# Patient Record
Sex: FEMALE | Race: BLACK OR AFRICAN AMERICAN | NOT HISPANIC OR LATINO | Employment: OTHER | URBAN - METROPOLITAN AREA
[De-identification: names, ages, dates, MRNs, and addresses within clinical notes are randomized per-mention and may not be internally consistent; named-entity substitution may affect disease eponyms.]

---

## 2020-05-28 ENCOUNTER — LAB VISIT (OUTPATIENT)
Dept: PRIMARY CARE CLINIC | Facility: CLINIC | Age: 75
End: 2020-05-28
Payer: MEDICARE

## 2020-05-28 DIAGNOSIS — R05.9 COUGH: Primary | ICD-10-CM

## 2020-05-28 PROCEDURE — U0003 INFECTIOUS AGENT DETECTION BY NUCLEIC ACID (DNA OR RNA); SEVERE ACUTE RESPIRATORY SYNDROME CORONAVIRUS 2 (SARS-COV-2) (CORONAVIRUS DISEASE [COVID-19]), AMPLIFIED PROBE TECHNIQUE, MAKING USE OF HIGH THROUGHPUT TECHNOLOGIES AS DESCRIBED BY CMS-2020-01-R: HCPCS

## 2020-05-29 LAB — SARS-COV-2 RNA RESP QL NAA+PROBE: NOT DETECTED

## 2021-10-19 ENCOUNTER — OFFICE VISIT (OUTPATIENT)
Dept: URGENT CARE | Facility: CLINIC | Age: 76
End: 2021-10-19
Payer: MEDICARE

## 2021-10-19 VITALS
HEART RATE: 85 BPM | SYSTOLIC BLOOD PRESSURE: 141 MMHG | HEIGHT: 62 IN | WEIGHT: 154 LBS | DIASTOLIC BLOOD PRESSURE: 88 MMHG | BODY MASS INDEX: 28.34 KG/M2 | RESPIRATION RATE: 17 BRPM | OXYGEN SATURATION: 98 % | TEMPERATURE: 97 F

## 2021-10-19 DIAGNOSIS — R19.7 DIARRHEA, UNSPECIFIED TYPE: Primary | ICD-10-CM

## 2021-10-19 DIAGNOSIS — A08.4 VIRAL GASTROENTERITIS: ICD-10-CM

## 2021-10-19 LAB
CTP QC/QA: YES
GLUCOSE SERPL-MCNC: 121 MG/DL (ref 70–110)
POC ANION GAP: 20 MMOL/L (ref 10–20)
POC BUN: 13 MMOL/L (ref 8–26)
POC CHLORIDE: 99 MMOL/L (ref 98–109)
POC CREATININE: 0.9 MG/DL (ref 0.6–1.3)
POC HEMATOCRIT: 41 %PCV (ref 37–47)
POC HEMOGLOBIN: 13.9 G/DL (ref 12.5–16)
POC ICA: 1.11 MMOL/L (ref 1.12–1.32)
POC POTASSIUM: 3.9 MMOL/L (ref 3.5–4.9)
POC SODIUM: 136 MMOL/L (ref 138–146)
POC TCO2: 22 MMOL/L (ref 24–29)
SARS-COV-2 RDRP RESP QL NAA+PROBE: NEGATIVE

## 2021-10-19 PROCEDURE — U0002: ICD-10-PCS | Mod: QW,CR,S$GLB,

## 2021-10-19 PROCEDURE — 99203 OFFICE O/P NEW LOW 30 MIN: CPT | Mod: S$GLB,CS,,

## 2021-10-19 PROCEDURE — 80047 BASIC METABLC PNL IONIZED CA: CPT | Mod: QW,S$GLB,,

## 2021-10-19 PROCEDURE — 99203 PR OFFICE/OUTPT VISIT, NEW, LEVL III, 30-44 MIN: ICD-10-PCS | Mod: S$GLB,CS,,

## 2021-10-19 PROCEDURE — U0002 COVID-19 LAB TEST NON-CDC: HCPCS | Mod: QW,CR,S$GLB,

## 2021-10-19 PROCEDURE — 80047 POCT CHEMISTRY PANEL: ICD-10-PCS | Mod: QW,S$GLB,,

## 2023-09-08 ENCOUNTER — OFFICE VISIT (OUTPATIENT)
Dept: URGENT CARE | Facility: CLINIC | Age: 78
End: 2023-09-08
Payer: MEDICARE

## 2023-09-08 VITALS
RESPIRATION RATE: 20 BRPM | TEMPERATURE: 99 F | HEART RATE: 83 BPM | OXYGEN SATURATION: 98 % | BODY MASS INDEX: 28.34 KG/M2 | SYSTOLIC BLOOD PRESSURE: 143 MMHG | HEIGHT: 62 IN | WEIGHT: 154 LBS | DIASTOLIC BLOOD PRESSURE: 86 MMHG

## 2023-09-08 DIAGNOSIS — R52 BODY ACHES: Primary | ICD-10-CM

## 2023-09-08 DIAGNOSIS — J06.9 URI WITH COUGH AND CONGESTION: ICD-10-CM

## 2023-09-08 LAB
CTP QC/QA: YES
SARS-COV-2 AG RESP QL IA.RAPID: NEGATIVE

## 2023-09-08 PROCEDURE — 87811 SARS-COV-2 COVID19 W/OPTIC: CPT | Mod: QW,S$GLB,, | Performed by: FAMILY MEDICINE

## 2023-09-08 PROCEDURE — 87811 SARS CORONAVIRUS 2 ANTIGEN POCT, MANUAL READ: ICD-10-PCS | Mod: QW,S$GLB,, | Performed by: FAMILY MEDICINE

## 2023-09-08 PROCEDURE — 99213 OFFICE O/P EST LOW 20 MIN: CPT | Mod: S$GLB,,, | Performed by: FAMILY MEDICINE

## 2023-09-08 PROCEDURE — 99213 PR OFFICE/OUTPT VISIT, EST, LEVL III, 20-29 MIN: ICD-10-PCS | Mod: S$GLB,,, | Performed by: FAMILY MEDICINE

## 2023-09-08 RX ORDER — BENZONATATE 200 MG/1
200 CAPSULE ORAL 3 TIMES DAILY PRN
Qty: 30 CAPSULE | Refills: 0 | Status: SHIPPED | OUTPATIENT
Start: 2023-09-08 | End: 2023-09-18

## 2023-09-08 RX ORDER — PROMETHAZINE HYDROCHLORIDE AND DEXTROMETHORPHAN HYDROBROMIDE 6.25; 15 MG/5ML; MG/5ML
5 SYRUP ORAL EVERY 4 HOURS PRN
Qty: 240 ML | Refills: 0 | Status: SHIPPED | OUTPATIENT
Start: 2023-09-08 | End: 2023-09-18

## 2023-09-08 NOTE — PROGRESS NOTES
"Subjective:      Patient ID: Caridad Hinds is a 77 y.o. female.    Vitals:  height is 5' 2.01" (1.575 m) and weight is 69.9 kg (154 lb). Her temperature is 99.4 °F (37.4 °C). Her blood pressure is 143/86 (abnormal) and her pulse is 83. Her respiration is 20 and oxygen saturation is 98%.     Chief Complaint: Sore Throat    Sore Throat   This is a new problem. The current episode started in the past 7 days (started on tuesday). The problem has been gradually worsening. Neither side of throat is experiencing more pain than the other. There has been no fever. Associated symptoms include congestion, coughing, ear pain (both), headaches and trouble swallowing. Pertinent negatives include no diarrhea, drooling, ear discharge, hoarse voice, neck pain, shortness of breath, stridor or swollen glands. Associated symptoms comments: BODY ACHES and  chest congestion     . She has had no exposure to strep or mono. She has tried nothing for the symptoms.       HENT:  Positive for ear pain (both), congestion, sore throat and trouble swallowing. Negative for ear discharge and drooling.    Neck: Negative for neck pain.   Respiratory:  Positive for cough. Negative for shortness of breath and stridor.    Gastrointestinal:  Negative for diarrhea.   Neurological:  Positive for headaches.      Objective:     Vitals:    09/08/23 0946   BP: (!) 143/86   BP Location: Right arm   Patient Position: Sitting   BP Method: Medium (Automatic)   Pulse: 83   Resp: 20   Temp: 99.4 °F (37.4 °C)   SpO2: 98%   Weight: 69.9 kg (154 lb)   Height: 5' 2.01" (1.575 m)      Physical Exam   Constitutional: She is oriented to person, place, and time.  Non-toxic appearance. She does not appear ill. No distress.   HENT:   Head: Atraumatic.   Eyes: Conjunctivae are normal.   Cardiovascular: Normal rate, regular rhythm, normal heart sounds and normal pulses.   Pulmonary/Chest: Effort normal and breath sounds normal.   Neurological: She is alert and oriented to " person, place, and time.   Skin: Skin is not diaphoretic.   Psychiatric: Judgment and thought content normal.     Results for orders placed or performed in visit on 09/08/23   SARS Coronavirus 2 Antigen, POCT Manual Read   Result Value Ref Range    SARS Coronavirus 2 Antigen Negative Negative     Acceptable Yes       Assessment:     1. Body aches    2. URI with cough and congestion        Plan:       Body aches  -     SARS Coronavirus 2 Antigen, POCT Manual Read    2. URI with cough and congestion  -     promethazine-dextromethorphan (PROMETHAZINE-DM) 6.25-15 mg/5 mL Syrp; Take 5 mLs by mouth every 4 (four) hours as needed (cough).  Dispense: 240 mL; Refill: 0  -     benzonatate (TESSALON) 200 MG capsule; Take 1 capsule (200 mg total) by mouth 3 (three) times daily as needed for Cough.  Dispense: 30 capsule; Refill: 0      Patient Instructions   Below are suggestions for symptomatic relief of your upper respiratory symptoms:              -Salt water gargles to soothe throat pain.              -Chloroseptic spray and Cepacol lozenges also help to numb throat pain.              -Warm herbal teas with honey/lemon/ginger can help soothe sore throat and hoarseness              -Nasal saline spray reduces inflammation and dryness.              -Warm face compresses to help with facial sinus pain/pressure.              -Humidifiers and steam can help with nasal dryness and congestion              -Vicks vapor rub at night for chest congestion.              -Flonase OTC or Nasacort OTC for nasal congestion and post-nasal drip. Ok to use twice daily for the first week, then reduce to once daily after symptoms have begun to improve.              -Afrin is a nasal spray that can give immediate relief of nasal congestion but you cannot use this medication for more than 3 days              -Simple foods like chicken noodle soup.              - Mucinex for congestion or Mucinex DM for cough during the day time.  Delsym helps with coughing at night. Mucinex-D if you have sinus pressure/sinus pain or chest congestion. (caution if history of high blood pressure or palpitations). You must increase your water intake when using expectorants (Mucinex).             -Zyrtec/Claritin/Allegra/Xyzal should help with allergies.  -If you DO NOT have Hypertension or any history of palpitations, it is ok to take over the counter Sudafed or Mucinex D or Allegra-D or Claritin-D or Zyrtec-D.  -If you do take one of the above, it is ok to combine that with plain over the counter Mucinex or Allegra or Claritin or Zyrtec. If, for example, you are taking Zyrtec -D, you can combine that with Mucinex, but not Mucinex-D.  If you are taking Mucinex-D, you can combine that with plain Allegra or Claritin or Zyrtec.   -If you DO have Hypertension or palpitations, it is safe to take Coricidin HBP for relief of sinus symptoms.     Seek immediate care in the emergency room in the event of severe abdominal pain, chest pain, respiratory distress, fever unresponsive to antipyretic, dehydration, loss of consciousness, seizure.

## 2023-09-08 NOTE — PATIENT INSTRUCTIONS
Below are suggestions for symptomatic relief of your upper respiratory symptoms:              -Salt water gargles to soothe throat pain.              -Chloroseptic spray and Cepacol lozenges also help to numb throat pain.              -Warm herbal teas with honey/lemon/carlos can help soothe sore throat and hoarseness              -Nasal saline spray reduces inflammation and dryness.              -Warm face compresses to help with facial sinus pain/pressure.              -Humidifiers and steam can help with nasal dryness and congestion              -Vicks vapor rub at night for chest congestion.              -Flonase OTC or Nasacort OTC for nasal congestion and post-nasal drip. Ok to use twice daily for the first week, then reduce to once daily after symptoms have begun to improve.              -Afrin is a nasal spray that can give immediate relief of nasal congestion but you cannot use this medication for more than 3 days              -Simple foods like chicken noodle soup.              - Mucinex for congestion or Mucinex DM for cough during the day time. Delsym helps with coughing at night. Mucinex-D if you have sinus pressure/sinus pain or chest congestion. (caution if history of high blood pressure or palpitations). You must increase your water intake when using expectorants (Mucinex).             -Zyrtec/Claritin/Allegra/Xyzal should help with allergies.  -If you DO NOT have Hypertension or any history of palpitations, it is ok to take over the counter Sudafed or Mucinex D or Allegra-D or Claritin-D or Zyrtec-D.  -If you do take one of the above, it is ok to combine that with plain over the counter Mucinex or Allegra or Claritin or Zyrtec. If, for example, you are taking Zyrtec -D, you can combine that with Mucinex, but not Mucinex-D.  If you are taking Mucinex-D, you can combine that with plain Allegra or Claritin or Zyrtec.   -If you DO have Hypertension or palpitations, it is safe to take Coricidin HBP for  relief of sinus symptoms.     Seek immediate care in the emergency room in the event of severe abdominal pain, chest pain, respiratory distress, fever unresponsive to antipyretic, dehydration, loss of consciousness, seizure.

## 2023-11-01 ENCOUNTER — OFFICE VISIT (OUTPATIENT)
Dept: ORTHOPEDICS | Facility: CLINIC | Age: 78
End: 2023-11-01
Payer: MEDICARE

## 2023-11-01 VITALS — WEIGHT: 154 LBS | HEIGHT: 62 IN | BODY MASS INDEX: 28.34 KG/M2

## 2023-11-01 DIAGNOSIS — M47.817 LUMBOSACRAL SPONDYLOSIS WITHOUT MYELOPATHY: ICD-10-CM

## 2023-11-01 DIAGNOSIS — M48.062 LUMBAR STENOSIS WITH NEUROGENIC CLAUDICATION: Primary | ICD-10-CM

## 2023-11-01 DIAGNOSIS — M43.16 SPONDYLOLISTHESIS, LUMBAR REGION: ICD-10-CM

## 2023-11-01 PROCEDURE — 99203 OFFICE O/P NEW LOW 30 MIN: CPT | Mod: S$GLB,,, | Performed by: ORTHOPAEDIC SURGERY

## 2023-11-01 PROCEDURE — 99203 PR OFFICE/OUTPT VISIT, NEW, LEVL III, 30-44 MIN: ICD-10-PCS | Mod: S$GLB,,, | Performed by: ORTHOPAEDIC SURGERY

## 2023-11-01 RX ORDER — DICYCLOMINE HYDROCHLORIDE 20 MG/1
20 TABLET ORAL 3 TIMES DAILY
COMMUNITY
Start: 2023-10-30

## 2023-11-01 RX ORDER — CLOPIDOGREL BISULFATE 75 MG/1
75 TABLET ORAL
COMMUNITY
Start: 2023-10-16

## 2023-11-01 RX ORDER — FAMOTIDINE 40 MG/1
40 TABLET, FILM COATED ORAL NIGHTLY
COMMUNITY
Start: 2023-08-07

## 2023-11-01 RX ORDER — CALCIUM CARB/VITAMIN D3/VIT K1 500-500-40
TABLET,CHEWABLE ORAL
COMMUNITY

## 2023-11-01 RX ORDER — ROSUVASTATIN CALCIUM 10 MG/1
10 TABLET, COATED ORAL
COMMUNITY
Start: 2023-09-11

## 2023-11-01 RX ORDER — METOPROLOL SUCCINATE 100 MG/1
100 TABLET, EXTENDED RELEASE ORAL
COMMUNITY
Start: 2023-09-11

## 2023-11-01 RX ORDER — CYCLOSPORINE 0.5 MG/ML
1 EMULSION OPHTHALMIC 2 TIMES DAILY
COMMUNITY
Start: 2023-07-07

## 2023-11-01 NOTE — PROGRESS NOTES
Subjective:       Patient ID: Caridad Hinds is a 77 y.o. female.    Chief Complaint: Pain of the Lumbar Spine (Chronic BL lumbar pain that started to worsen over the last 2 months ago. Pair radiates down left leg to toes. Right leg is described as dull and achy with numb toes. Had MRI in 2015.Does complain pain and stiffness is more prominent after significant activity. Has been going to PT with some improvement)      History of Present Illness    Prior to meeting with the patient I reviewed the medical chart in [facility]. This included reviewing the previous progress notes from our office, review of the patient's last appointment with their primary care provider, review of any visits to the emergency room, and review of any pain management appointments or procedures.   77-year-old lady with chronic complaints of low back pain radiating down the left leg to left foot and right leg to the right knee history of of stenosis 7 years ago responded to an epidural steroid injection.  Presently complains of bilateral leg pain back and buttock symptoms no bowel she has undergone physical therapy and chiropractic treatments recently and they have not helped her claudication symptoms.    Current Medications  Current Outpatient Medications   Medication Sig Dispense Refill    cholecalciferol, vitamin D3, 10 mcg (400 unit) Cap capsule Take by mouth.      clopidogreL (PLAVIX) 75 mg tablet Take 75 mg by mouth.      dicyclomine (BENTYL) 20 mg tablet Take 20 mg by mouth 3 (three) times daily.      famotidine (PEPCID) 40 MG tablet Take 40 mg by mouth every evening.      metoprolol succinate (TOPROL-XL) 100 MG 24 hr tablet Take 100 mg by mouth.      metoprolol tartrate (LOPRESSOR) 50 MG tablet Take 50 mg by mouth 2 (two) times daily.      olmesartan-hydrochlorothiazide (BENICAR HCT) 40-25 mg per tablet Take 1 tablet by mouth once daily.      potassium chloride SA (K-DUR,KLOR-CON) 10 MEQ tablet Take 10 mEq by mouth once daily.      RESTASIS  0.05 % ophthalmic emulsion Place 1 drop into both eyes 2 (two) times daily.      rosuvastatin (CRESTOR) 10 MG tablet Take 10 mg by mouth.      thyroid, pork, (ARMOUR THYROID) 60 mg Tab Take 60 mg by mouth once daily.      meloxicam (MOBIC) 7.5 MG tablet Take 1 tablet (7.5 mg total) by mouth once daily. (Patient not taking: Reported on 11/1/2023) 15 tablet 0     No current facility-administered medications for this visit.       Allergies  Review of patient's allergies indicates:   Allergen Reactions    Darvocet a500 [propoxyphene n-acetaminophen] Hives     Combination drug containing Darvocet, flexeril, & ibuprofen caused hives; does not know which ingredient caused the reaction    Flexeril [cyclobenzaprine] Hives     Combination drug containing Darvocet, flexeril, & ibuprofen caused hives; does not know which ingredient caused the reaction    Motrin [ibuprofen] Hives     Combination drug containing Darvocet, flexeril, & ibuprofen caused hives; does not know which ingredient caused the reaction    Hydrocodone-acetaminophen Other (See Comments)     Other reaction(s): NVD      Sulfamethoxazole-trimethoprim Other (See Comments)    Tramadol Other (See Comments)     Other reaction(s): NVD         Past Medical History  Past Medical History:   Diagnosis Date    Hyperlipidemia     Hypertension     Thyroid disease        Surgical History  Past Surgical History:   Procedure Laterality Date    falopian Left 1986    ectopic pregnancy    THYROIDECTOMY, PARTIAL N/A 1972       Family History:   History reviewed. No pertinent family history.    Social History:   Social History     Socioeconomic History    Marital status:    Tobacco Use    Smoking status: Never    Smokeless tobacco: Never   Substance and Sexual Activity    Alcohol use: No       Hospitalization/Major Diagnostic Procedure:     Review of Systems     General/Constitutional:  Chills denies. Fatigue denies. Fever denies. Weight gain denies. Weight loss  denies.    Respiratory:  Shortness of breath denies.    Cardiovascular:  Chest pain denies.    Gastrointestinal:  Constipation denies. Diarrhea denies. Nausea denies. Vomiting denies.     Hematology:  Easy bruising denies. Prolonged bleeding denies.     Genitourinary:  Frequent urination denies. Pain in lower back denies. Painful urination denies.     Musculoskeletal:  See HPI for details    Skin:  Rash denies.    Neurologic:  Dizziness denies. Gait abnormalities denies. Seizures denies. Tingling/Numbess denies.    Psychiatric:  Anxiety denies. Depressed mood denies.     Objective:   Vital Signs: There were no vitals filed for this visit.     Physical Exam      General Examination:     Constitutional: The patient is alert and oriented to lace person and time. Mood is pleasant.     Head/Face: Normal facial features normal eyebrows    Eyes: Normal extraocular motion bilaterally    Lungs: Respirations are equal and unlabored    Gait is coordinated.    Cardiovascular: There are no swelling or varicosities present.    Lymphatic: Negative for adenopathy    Skin: Normal    Neurological: Level of consciousness normal. Oriented to place person and time and situation    Psychiatric: Oriented to time place person and situation    Patient stand erect has pain when doing so tenderness both posterior iliac spine areas range of motion mildly restricted straight-leg-raising positive on the left motor exam normal both lower extremities hip and knee range of motion intact pedal pulses intact no edema adenopathy varicosities.    XRAY Report/ Interpretation :  AP pelvis x-ray was taken today. Indications low back pain and/or hip pain. Findings AP pelvis x-ray appears to be normal with no evidence of fractures or significant degenerative disease  AP and lateral x-rays of lumbar spine will performed today. Indications low back pain. Findings:  Grade 1 degenerative spondylolisthesis L4-5 marked disc space narrowing lower lumbar spine most  marked multilevel  Lumbar MRI report from 2015 was reviewed significant spinal stenosis at L4-5 and degenerative spondylolisthesis at L4-5 actual films were not available    Assessment:       1. Lumbar stenosis with neurogenic claudication    2. Lumbosacral spondylosis without myelopathy    3. Spondylolisthesis, lumbar region        Plan:       Caridad was seen today for pain.    Diagnoses and all orders for this visit:    Lumbar stenosis with neurogenic claudication    Lumbosacral spondylosis without myelopathy  -     X-Ray Lumbar Spine Ap And Lateral  -     X-Ray Pelvis Routine AP    Spondylolisthesis, lumbar region         No follow-ups on file.    Probable progression of spinal stenosis at L4-5 possible adjacent segment stenosis new MRI has been advised I have explained the nature of the condition and the probability that the only treatment options will be a repeat epidural steroid injection versus lumbar decompression.  Return after MRI for further discussion  Treatment options were discussed with regards to the nature of the medical condition. Conservative pain intervention and surgical options were discussed in detail. The probability of success of each separate treatment option was discussed. The patient expressed a clear understanding of the treatment options. With regards to surgery, the procedure risk, benefits, complications, and outcomes were discussed. No guarantees were given with regards to surgical outcome.   The risk of complications, morbidity, and mortality of patient management decisions have been made at the time of this visit. These are associated with the patient's problems, diagnostic procedures and treatment options. This includes the possible management options selected and those considered but not selected by the patient after shared medical decision making we discussed with the patient.   This note was created using Dragon voice recognition software that occasionally misinterpreted phrases  or words.

## 2023-11-03 ENCOUNTER — TELEPHONE (OUTPATIENT)
Dept: ORTHOPEDICS | Facility: CLINIC | Age: 78
End: 2023-11-03

## 2023-11-03 NOTE — TELEPHONE ENCOUNTER
Submitted PA through pt secondary [facility] Medicare. Received not in scope message. Called number on back of that card. They do not do PA's, said to call medicare. They do not require PA's. Marked MRI authorized.

## 2023-11-20 ENCOUNTER — OFFICE VISIT (OUTPATIENT)
Dept: ORTHOPEDICS | Facility: CLINIC | Age: 78
End: 2023-11-20
Payer: MEDICARE

## 2023-11-20 VITALS — BODY MASS INDEX: 28.34 KG/M2 | WEIGHT: 154 LBS | HEIGHT: 62 IN

## 2023-11-20 DIAGNOSIS — M51.26 LUMBAR DISC HERNIATION: ICD-10-CM

## 2023-11-20 DIAGNOSIS — M43.16 SPONDYLOLISTHESIS, LUMBAR REGION: ICD-10-CM

## 2023-11-20 DIAGNOSIS — M48.062 LUMBAR STENOSIS WITH NEUROGENIC CLAUDICATION: Primary | ICD-10-CM

## 2023-11-20 PROCEDURE — 99213 OFFICE O/P EST LOW 20 MIN: CPT | Mod: S$GLB,,, | Performed by: ORTHOPAEDIC SURGERY

## 2023-11-20 PROCEDURE — 99213 PR OFFICE/OUTPT VISIT, EST, LEVL III, 20-29 MIN: ICD-10-PCS | Mod: S$GLB,,, | Performed by: ORTHOPAEDIC SURGERY

## 2023-11-20 NOTE — PROGRESS NOTES
Subjective:       Patient ID: Caridad Hinds is a 77 y.o. female.    Chief Complaint: Pain of the Lumbar Spine (Lumbar pain follow up. Here for MRI results review. States that her pain continues to radiate down BL legs to toes. Left is worse and more frequent to right)      History of Present Illness    Prior to meeting with the patient I reviewed the medical chart in [facility]. This included reviewing the previous progress notes from our office, review of the patient's last appointment with their primary care provider, review of any visits to the emergency room, and review of any pain management appointments or procedures.   Patient is here follow-up for bilateral leg pain back and buttock symptoms.  Denies any bowel or bladder dysfunction.  She has undergone physical therapy and chiropractic treatments recently and they have not helped her claudication symptoms.  She is here to review an updated lumbar MRI.    She has a past medical history of chronic low back pain radiating down the left leg to left foot and right leg to the right knee with a history of of stenosis 7 years ago responded to an epidural steroid injection.      Current Medications  Current Outpatient Medications   Medication Sig Dispense Refill    cholecalciferol, vitamin D3, 10 mcg (400 unit) Cap capsule Take by mouth.      clopidogreL (PLAVIX) 75 mg tablet Take 75 mg by mouth.      dicyclomine (BENTYL) 20 mg tablet Take 20 mg by mouth 3 (three) times daily.      famotidine (PEPCID) 40 MG tablet Take 40 mg by mouth every evening.      metoprolol succinate (TOPROL-XL) 100 MG 24 hr tablet Take 100 mg by mouth.      olmesartan-hydrochlorothiazide (BENICAR HCT) 40-25 mg per tablet Take 1 tablet by mouth once daily.      potassium chloride SA (K-DUR,KLOR-CON) 10 MEQ tablet Take 10 mEq by mouth once daily.      RESTASIS 0.05 % ophthalmic emulsion Place 1 drop into both eyes 2 (two) times daily.      rosuvastatin (CRESTOR) 10 MG tablet Take 10 mg by mouth.       thyroid, pork, (ARMOUR THYROID) 60 mg Tab Take 60 mg by mouth once daily.      meloxicam (MOBIC) 7.5 MG tablet Take 1 tablet (7.5 mg total) by mouth once daily. (Patient not taking: Reported on 11/20/2023) 15 tablet 0    metoprolol tartrate (LOPRESSOR) 50 MG tablet Take 50 mg by mouth 2 (two) times daily.       No current facility-administered medications for this visit.       Allergies  Review of patient's allergies indicates:   Allergen Reactions    Darvocet a500 [propoxyphene n-acetaminophen] Hives     Combination drug containing Darvocet, flexeril, & ibuprofen caused hives; does not know which ingredient caused the reaction    Flexeril [cyclobenzaprine] Hives     Combination drug containing Darvocet, flexeril, & ibuprofen caused hives; does not know which ingredient caused the reaction    Motrin [ibuprofen] Hives     Combination drug containing Darvocet, flexeril, & ibuprofen caused hives; does not know which ingredient caused the reaction    Hydrocodone-acetaminophen Other (See Comments)     Other reaction(s): NVD      Sulfamethoxazole-trimethoprim Other (See Comments)    Tramadol Other (See Comments)     Other reaction(s): NVD         Past Medical History  Past Medical History:   Diagnosis Date    Hyperlipidemia     Hypertension     Thyroid disease        Surgical History  Past Surgical History:   Procedure Laterality Date    falopian Left 1986    ectopic pregnancy    THYROIDECTOMY, PARTIAL N/A 1972       Family History:   History reviewed. No pertinent family history.    Social History:   Social History     Socioeconomic History    Marital status:    Tobacco Use    Smoking status: Never    Smokeless tobacco: Never   Substance and Sexual Activity    Alcohol use: No       Hospitalization/Major Diagnostic Procedure:     Review of Systems     General/Constitutional:  Chills denies. Fatigue denies. Fever denies. Weight gain denies. Weight loss denies.    Respiratory:  Shortness of breath  denies.    Cardiovascular:  Chest pain denies.    Gastrointestinal:  Constipation denies. Diarrhea denies. Nausea denies. Vomiting denies.     Hematology:  Easy bruising denies. Prolonged bleeding denies.     Genitourinary:  Frequent urination denies. Pain in lower back denies. Painful urination denies.     Musculoskeletal:  See HPI for details    Skin:  Rash denies.    Neurologic:  Dizziness denies. Gait abnormalities denies. Seizures denies. Tingling/Numbess denies.    Psychiatric:  Anxiety denies. Depressed mood denies.     Objective:   Vital Signs: There were no vitals filed for this visit.     Physical Exam      General Examination:     Constitutional: The patient is alert and oriented to lace person and time. Mood is pleasant.     Head/Face: Normal facial features normal eyebrows    Eyes: Normal extraocular motion bilaterally    Lungs: Respirations are equal and unlabored    Gait is coordinated.    Cardiovascular: There are no swelling or varicosities present.    Lymphatic: Negative for adenopathy    Skin: Normal    Neurological: Level of consciousness normal. Oriented to place person and time and situation    Psychiatric: Oriented to time place person and situation    Physical exam is unchanged.  Significantly limited lumbar extension, but what little extension she has does cause pain.    XRAY Report/ Interpretation:  Lumbar MRI by report only demonstrates advanced multilevel degenerative disc disease with facet arthropathy causing foraminal stenosis at almost every level.  There is also multilevel spondylolisthesis because of the above.  Evidence of a pelvic cyst right of midline.      Assessment:       1. Lumbar stenosis with neurogenic claudication    2. Spondylolisthesis, lumbar region    3. Lumbar disc herniation        Plan:       Caridad was seen today for pain.    Diagnoses and all orders for this visit:    Lumbar stenosis with neurogenic claudication  -     Cancel: Ambulatory referral/consult to Pain  Clinic; Future  -     Cancel: Ambulatory referral/consult to Pain Clinic; Future  -     Ambulatory referral/consult to Pain Clinic; Future    Spondylolisthesis, lumbar region  -     Cancel: Ambulatory referral/consult to Pain Clinic; Future  -     Cancel: Ambulatory referral/consult to Pain Clinic; Future  -     Ambulatory referral/consult to Pain Clinic; Future    Lumbar disc herniation  -     Cancel: Ambulatory referral/consult to Pain Clinic; Future  -     Cancel: Ambulatory referral/consult to Pain Clinic; Future  -     Ambulatory referral/consult to Pain Clinic; Future         Follow up in about 6 weeks (around 1/1/2024) for 6 week f/u, lumbar MBB with Dr Martinez.  This is to attest that the physician's assistant Shawn Allen served in the capacity as a scribe for this patient's encounter.  This is also to verify that I have reviewed the patient's history and helped formulate the treatment plan and discussed it with the physician's assistant.  I have actively participated  in the evaluation and treatment plan for this patient visit.  The treatment plan and medical decision-making is as outlined below:  Patient will be referred to Dr. Diallo carter for evaluation and treatment to proceed with lumbar medial branch blocks with progression towards a rhizotomy.  Follow-up with us in 6 weeks or sooner if needed.    Treatment options were discussed with regards to the nature of the medical condition. Conservative pain intervention and surgical options were discussed in detail. The probability of success of each separate treatment option was discussed. The patient expressed a clear understanding of the treatment options. With regards to surgery, the procedure risk, benefits, complications, and outcomes were discussed. No guarantees were given with regards to surgical outcome.   The risk of complications, morbidity, and mortality of patient management decisions have been made at the time of this visit. These are  associated with the patient's problems, diagnostic procedures and treatment options. This includes the possible management options selected and those considered but not selected by the patient after shared medical decision making we discussed with the patient.     This note was created using Dragon voice recognition software that occasionally misinterpreted phrases or words.

## 2023-12-07 ENCOUNTER — TELEPHONE (OUTPATIENT)
Dept: ORTHOPEDICS | Facility: CLINIC | Age: 78
End: 2023-12-07

## 2023-12-07 NOTE — TELEPHONE ENCOUNTER
Attempted to return call.  No Answer. Need to know where she would like to go for Pain Management in her area.

## 2023-12-07 NOTE — TELEPHONE ENCOUNTER
----- Message from Nhung Mendieta sent at 12/6/2023 12:21 PM CST -----  [phone]-she stated [facility] does not take Medicare-please send referral elsewhere

## 2024-05-06 ENCOUNTER — HOSPITAL ENCOUNTER (OUTPATIENT)
Dept: RADIOLOGY | Facility: OTHER | Age: 79
Discharge: HOME | End: 2024-05-06
Attending: INTERNAL MEDICINE
Payer: MEDICARE

## 2024-05-06 DIAGNOSIS — Z80.0 FAMILY HISTORY OF COLON CANCER: ICD-10-CM

## 2024-05-06 DIAGNOSIS — R19.06 EPIGASTRIC FULLNESS: ICD-10-CM

## 2024-05-06 DIAGNOSIS — K57.30 DIVERTICULOSIS OF LARGE INTESTINE WITHOUT PERFORATION OR ABSCESS WITHOUT BLEEDING: ICD-10-CM

## 2024-05-06 DIAGNOSIS — R10.13 ABDOMINAL PAIN, EPIGASTRIC: ICD-10-CM

## 2024-05-06 DIAGNOSIS — K21.9 GERD (GASTROESOPHAGEAL REFLUX DISEASE): ICD-10-CM

## 2024-05-06 PROCEDURE — 74177 CT ABD & PELVIS W/CONTRAST: CPT | Mod: TC

## 2024-05-06 PROCEDURE — 25500020 PHARM REV CODE 255: Performed by: INTERNAL MEDICINE

## 2024-05-06 PROCEDURE — 74177 CT ABD & PELVIS W/CONTRAST: CPT | Mod: 26,,, | Performed by: RADIOLOGY

## 2024-05-06 PROCEDURE — A9698 NON-RAD CONTRAST MATERIALNOC: HCPCS | Performed by: INTERNAL MEDICINE

## 2024-05-06 RX ADMIN — IOHEXOL 75 ML: 350 INJECTION, SOLUTION INTRAVENOUS at 05:05

## 2024-05-06 RX ADMIN — IOHEXOL 1000 ML: 9 SOLUTION ORAL at 05:05

## 2024-06-03 ENCOUNTER — OFFICE VISIT (OUTPATIENT)
Dept: ORTHOPEDICS | Facility: CLINIC | Age: 79
End: 2024-06-03
Payer: MEDICARE

## 2024-06-03 ENCOUNTER — TELEPHONE (OUTPATIENT)
Dept: ORTHOPEDICS | Facility: CLINIC | Age: 79
End: 2024-06-03
Payer: MEDICARE

## 2024-06-03 VITALS
SYSTOLIC BLOOD PRESSURE: 142 MMHG | DIASTOLIC BLOOD PRESSURE: 88 MMHG | BODY MASS INDEX: 26.5 KG/M2 | HEIGHT: 62 IN | WEIGHT: 144 LBS

## 2024-06-03 DIAGNOSIS — M48.062 LUMBAR STENOSIS WITH NEUROGENIC CLAUDICATION: ICD-10-CM

## 2024-06-03 DIAGNOSIS — M43.16 SPONDYLOLISTHESIS, LUMBAR REGION: ICD-10-CM

## 2024-06-03 DIAGNOSIS — M51.26 LUMBAR DISC HERNIATION: ICD-10-CM

## 2024-06-03 DIAGNOSIS — M47.817 LUMBOSACRAL SPONDYLOSIS WITHOUT MYELOPATHY: ICD-10-CM

## 2024-06-03 DIAGNOSIS — M47.816 LUMBAR FACET JOINT SYNDROME: Primary | ICD-10-CM

## 2024-06-03 PROCEDURE — 99213 OFFICE O/P EST LOW 20 MIN: CPT | Mod: S$GLB,,, | Performed by: ORTHOPAEDIC SURGERY

## 2024-06-03 NOTE — PROGRESS NOTES
Subjective:       Patient ID: Caridad Hinds is a 78 y.o. female.    Chief Complaint: Pain of the Lumbar Spine (Lumbar pain is worse, she did not see Dr Saul, they do not accept Medicare, no leg pain, left leg lateral aspect of thigh gets tingling and left foot gets cramps, )      History of Present Illness    Prior to meeting with the patient I reviewed the medical chart in [facility]. This included reviewing the previous progress notes from our office, review of the patient's last appointment with their primary care provider, review of any visits to the emergency room, and review of any pain management appointments or procedures.   Patient returns for reassessment continued complaints of low back pain mainly axial pain occasionally than lateral aspect of the left thigh tried to get in to see someone in [place] who did not accept Medicare insurance and wants another referral no worsening of complaints    Current Medications  Current Outpatient Medications   Medication Sig Dispense Refill    cholecalciferol, vitamin D3, 10 mcg (400 unit) Cap capsule Take by mouth.      clopidogreL (PLAVIX) 75 mg tablet Take 75 mg by mouth.      dicyclomine (BENTYL) 20 mg tablet Take 20 mg by mouth 3 (three) times daily.      famotidine (PEPCID) 40 MG tablet Take 40 mg by mouth every evening.      meloxicam (MOBIC) 7.5 MG tablet Take 1 tablet (7.5 mg total) by mouth once daily. 15 tablet 0    metoprolol succinate (TOPROL-XL) 100 MG 24 hr tablet Take 100 mg by mouth.      metoprolol tartrate (LOPRESSOR) 50 MG tablet Take 50 mg by mouth 2 (two) times daily.      olmesartan-hydrochlorothiazide (BENICAR HCT) 40-25 mg per tablet Take 1 tablet by mouth once daily.      potassium chloride SA (K-DUR,KLOR-CON) 10 MEQ tablet Take 10 mEq by mouth once daily.      RESTASIS 0.05 % ophthalmic emulsion Place 1 drop into both eyes 2 (two) times daily.      rosuvastatin (CRESTOR) 10 MG tablet Take 10 mg by mouth.      thyroid, pork, (ARMOUR THYROID)  60 mg Tab Take 60 mg by mouth once daily.       No current facility-administered medications for this visit.       Allergies  Review of patient's allergies indicates:   Allergen Reactions    Darvocet a500 [propoxyphene n-acetaminophen] Hives     Combination drug containing Darvocet, flexeril, & ibuprofen caused hives; does not know which ingredient caused the reaction    Flexeril [cyclobenzaprine] Hives     Combination drug containing Darvocet, flexeril, & ibuprofen caused hives; does not know which ingredient caused the reaction    Motrin [ibuprofen] Hives     Combination drug containing Darvocet, flexeril, & ibuprofen caused hives; does not know which ingredient caused the reaction    Hydrocodone-acetaminophen Other (See Comments)     Other reaction(s): NVD      Sulfamethoxazole-trimethoprim Other (See Comments)    Tramadol Other (See Comments)     Other reaction(s): NVD         Past Medical History  Past Medical History:   Diagnosis Date    Hyperlipidemia     Hypertension     Thyroid disease        Surgical History  Past Surgical History:   Procedure Laterality Date    falopian Left 1986    ectopic pregnancy    THYROIDECTOMY, PARTIAL N/A 1972       Family History:   No family history on file.    Social History:   Social History     Socioeconomic History    Marital status:    Tobacco Use    Smoking status: Never    Smokeless tobacco: Never   Substance and Sexual Activity    Alcohol use: No     Social Determinants of Health     Financial Resource Strain: Medium Risk (11/19/2023)    [place] [facility]    Overall Financial Resource Strain (CARDIA)     Difficulty of Paying Living Expenses: Somewhat hard   Food Insecurity: No Food Insecurity (11/19/2023)    [facility]    Hunger Vital Sign     Worried About Running Out of Food in the Last Year: Never true     Ran Out of Food in the Last Year: Never true   Transportation Needs: No Transportation Needs (11/19/2023)     Received from [facility] [employer], [facility]    PRAPARE - Transportation     Lack of Transportation (Medical): No     Lack of Transportation (Non-Medical): No   Physical Activity: Insufficiently Active (11/19/2023)    Received from [facility] [employer], [facility]    Exercise Vital Sign     Days of Exercise per Week: 3 days     Minutes of Exercise per Session: 30 min   Stress: Stress Concern Present (11/19/2023)    Received from [facility] [employer], [facility]    [employer] [place] [employer] - Occupational Stress Questionnaire     Feeling of Stress : To some extent   Housing Stability: Low Risk  (11/19/2023)    Received from [facility]    Housing Stability Vital Sign     Unable to Pay for Housing in the Last Year: No     Number of Places Lived in the Last Year: 1     In the last 12 months, was there a time when you did not have a steady place to sleep or slept in a shelter (including now)?: No       Hospitalization/Major Diagnostic Procedure:     Review of Systems     General/Constitutional:  Chills denies. Fatigue denies. Fever denies. Weight gain denies. Weight loss denies.    Respiratory:  Shortness of breath denies.    Cardiovascular:  Chest pain denies.    Gastrointestinal:  Constipation denies. Diarrhea denies. Nausea denies. Vomiting denies.     Hematology:  Easy bruising denies. Prolonged bleeding denies.     Genitourinary:  Frequent urination denies. Pain in lower back denies. Painful urination denies.     Musculoskeletal:  See HPI for details    Skin:  Rash denies.    Neurologic:  Dizziness denies. Gait abnormalities denies. Seizures denies. Tingling/Numbess denies.    Psychiatric:  Anxiety denies. Depressed mood denies.     Objective:   Vital Signs:   Vitals:    06/03/24 1304   BP: (!) 142/88        Physical Exam      General Examination:     Constitutional: The patient is alert and oriented to lace person and time. Mood is pleasant.     Head/Face: Normal facial features normal eyebrows    Eyes: Normal  extraocular motion bilaterally    Lungs: Respirations are equal and unlabored    Gait is coordinated.    Cardiovascular: There are no swelling or varicosities present.    Lymphatic: Negative for adenopathy    Skin: Normal    Neurological: Level of consciousness normal. Oriented to place person and time and situation    Psychiatric: Oriented to time place person and situation    Some tenderness both left and right posterior iliac spine areas no palpable spasm limited range motion pain with facet joint loading noted straight-leg-raising negative    XRAY Report/ Interpretation :  Prior diagnostic studies reviewed no new studies      Assessment:       1. Lumbar facet joint syndrome    2. Lumbar stenosis with neurogenic claudication    3. Spondylolisthesis, lumbar region    4. Lumbar disc herniation    5. Lumbosacral spondylosis without myelopathy        Plan:       Caridad was seen today for pain.    Diagnoses and all orders for this visit:    Lumbar facet joint syndrome    Lumbar stenosis with neurogenic claudication    Spondylolisthesis, lumbar region  -     Ambulatory referral/consult to Pain Clinic; Future    Lumbar disc herniation  -     Ambulatory referral/consult to Pain Clinic; Future    Lumbosacral spondylosis without myelopathy  -     Ambulatory referral/consult to Pain Clinic; Future         Follow up in 2 months (on 8/3/2024) for 2 month f/u, lumbar pain.    Referral made for treatment and evaluation of axial back pain not a surgical candidate.  Return 2 months or as needed  Treatment options were discussed with regards to the nature of the medical condition. Conservative pain intervention and surgical options were discussed in detail. The probability of success of each separate treatment option was discussed. The patient expressed a clear understanding of the treatment options. With regards to surgery, the procedure risk, benefits, complications, and outcomes were discussed. No guarantees were given with  regards to surgical outcome.   The risk of complications, morbidity, and mortality of patient management decisions have been made at the time of this visit. These are associated with the patient's problems, diagnostic procedures and treatment options. This includes the possible management options selected and those considered but not selected by the patient after shared medical decision making we discussed with the patient.   This note was created using Dragon voice recognition software that occasionally misinterpreted phrases or words.

## 2024-06-27 ENCOUNTER — HOSPITAL ENCOUNTER (OUTPATIENT)
Dept: RADIOLOGY | Facility: OTHER | Age: 79
Discharge: HOME | End: 2024-06-27
Attending: ANESTHESIOLOGY
Payer: MEDICARE

## 2024-06-27 ENCOUNTER — OFFICE VISIT (OUTPATIENT)
Dept: PAIN MEDICINE | Facility: CLINIC | Age: 79
End: 2024-06-27
Attending: ANESTHESIOLOGY
Payer: MEDICARE

## 2024-06-27 VITALS
BODY MASS INDEX: 26.17 KG/M2 | HEART RATE: 85 BPM | WEIGHT: 143.06 LBS | RESPIRATION RATE: 18 BRPM | DIASTOLIC BLOOD PRESSURE: 83 MMHG | OXYGEN SATURATION: 98 % | SYSTOLIC BLOOD PRESSURE: 133 MMHG

## 2024-06-27 DIAGNOSIS — M47.817 LUMBOSACRAL SPONDYLOSIS WITHOUT MYELOPATHY: ICD-10-CM

## 2024-06-27 DIAGNOSIS — M43.16 SPONDYLOLISTHESIS, LUMBAR REGION: ICD-10-CM

## 2024-06-27 DIAGNOSIS — M47.897 OTHER SPONDYLOSIS, LUMBOSACRAL REGION: ICD-10-CM

## 2024-06-27 DIAGNOSIS — M54.16 LUMBAR RADICULOPATHY: ICD-10-CM

## 2024-06-27 DIAGNOSIS — M51.26 LUMBAR DISC HERNIATION: ICD-10-CM

## 2024-06-27 DIAGNOSIS — M48.062 SPINAL STENOSIS OF LUMBAR REGION WITH NEUROGENIC CLAUDICATION: Primary | ICD-10-CM

## 2024-06-27 PROCEDURE — 99999 PR PBB SHADOW E&M-EST. PATIENT-LVL V: CPT | Mod: PBBFAC,,, | Performed by: ANESTHESIOLOGY

## 2024-06-27 PROCEDURE — 99204 OFFICE O/P NEW MOD 45 MIN: CPT | Mod: S$PBB,GC,, | Performed by: ANESTHESIOLOGY

## 2024-06-27 PROCEDURE — 72110 X-RAY EXAM L-2 SPINE 4/>VWS: CPT | Mod: TC,FY

## 2024-06-27 PROCEDURE — 73521 X-RAY EXAM HIPS BI 2 VIEWS: CPT | Mod: 26,,, | Performed by: RADIOLOGY

## 2024-06-27 PROCEDURE — 99215 OFFICE O/P EST HI 40 MIN: CPT | Mod: PBBFAC,25 | Performed by: ANESTHESIOLOGY

## 2024-06-27 PROCEDURE — 73521 X-RAY EXAM HIPS BI 2 VIEWS: CPT | Mod: TC,FY

## 2024-06-27 PROCEDURE — 72110 X-RAY EXAM L-2 SPINE 4/>VWS: CPT | Mod: 26,,, | Performed by: RADIOLOGY

## 2024-06-27 RX ORDER — GABAPENTIN 100 MG/1
100 CAPSULE ORAL 3 TIMES DAILY
Qty: 90 CAPSULE | Refills: 11 | Status: SHIPPED | OUTPATIENT
Start: 2024-06-27 | End: 2025-06-27

## 2024-06-27 RX ORDER — CELECOXIB 100 MG/1
100 CAPSULE ORAL 2 TIMES DAILY
Qty: 60 CAPSULE | Refills: 2 | Status: SHIPPED | OUTPATIENT
Start: 2024-06-27 | End: 2024-09-25

## 2024-06-27 NOTE — H&P (VIEW-ONLY)
Chronic Pain - New Consult    Referring Physician: Wicho Low MD    Chief Complaint:   Chief Complaint   Patient presents with    Low-back Pain    Back Pain    Pain    Hip Pain    Leg Pain        SUBJECTIVE:    Caridad Hinds presents to the clinic for the evaluation of low back pain radiating into her bilateral legs (L>>R). The pain started  2023 ago following no particular trauma or incident and symptoms have been worsening.The pain is located in the right lateral low back/hip area and radiates to the lateral thigh, anterior shin and foot.  The pain is described as aching although it was previously numb and tingling with associated heron horses and is rated as 5/10. The pain is rated with a score of  5/10 on the BEST day and a score of 7/10 on the WORST day.  Symptoms interfere with daily activity and work. The pain is exacerbated by cold, working in the garden, cleaning house, being on feet all day. She used to work out regularly and had to stop because of the pain.  The pain is mitigated by heat,     Patient denies night fever/night sweats, urinary incontinence, bowel incontinence, significant weight loss, significant motor weakness, and loss of sensations.  She does endorse     Physical Therapy/Home Exercise: yes - 2024 with limited relief    Pain Disability Index Review:      2024     1:50 PM   Last 3 PDI Scores   Pain Disability Index (PDI) 38       Pain Medications:  Tylenol 1g daily     report:  Reviewed and consistent with medication use as prescribed.    Pain Procedures:   2015 - [facility] with Dr. Bobo    Imagin/1/2024 XRAY Report/ Interpretation :    AP pelvis x-ray was taken today. Indications low back pain and/or hip pain. Findings AP pelvis x-ray appears to be normal with no evidence of fractures or significant degenerative disease  AP and lateral x-rays of lumbar spine will performed today. Indications low back pain. Findings:  Grade 1 degenerative spondylolisthesis L4-5  marked disc space narrowing lower lumbar spine most marked multilevel    Lumber MRI 2023        Lumbar MRI report from 2015   was reviewed significant spinal stenosis at L4-5 and degenerative spondylolisthesis at L4-5 actual films were not available    Past Medical History:   Diagnosis Date    Hyperlipidemia     Hypertension     Thyroid disease      Past Surgical History:   Procedure Laterality Date    falopian Left 1986    ectopic pregnancy    THYROIDECTOMY, PARTIAL N/A 1972     Social History     Socioeconomic History    Marital status:    Tobacco Use    Smoking status: Never    Smokeless tobacco: Never   Substance and Sexual Activity    Alcohol use: No     Social Determinants of Health     Financial Resource Strain: Medium Risk (11/19/2023)    Received from [facility] [employer], [facility]    Overall Financial Resource Strain (CARDIA)     Difficulty of Paying Living Expenses: Somewhat hard   Food Insecurity: No Food Insecurity (11/19/2023)    Received from [email or web address] [facility]    Hunger Vital Sign     Worried About Running Out of Food in the Last Year: Never true     Ran Out of Food in the Last Year: Never true   Transportation Needs: No Transportation Needs (11/19/2023)    Received from [email or web address] [facility]    PRAPARE - Transportation     Lack of Transportation (Medical): No     Lack of Transportation (Non-Medical): No   Physical Activity: Insufficiently Active (11/19/2023)    Received from [email or web address] [facility]    Exercise Vital Sign     Days of Exercise per Week: 3 days     Minutes of Exercise per Session: 30 min   Stress: Stress Concern Present (11/19/2023)    Received from [email or web address] [facility]    [employer] [place] [employer] - Occupational Stress Questionnaire     Feeling of Stress : To some extent   Housing Stability: Low Risk  (11/19/2023)    Received from [facility]    Housing Stability Vital Sign     Unable to Pay for Housing in the Last Year: No     Number of Places Lived in  the Last Year: 1     In the last 12 months, was there a time when you did not have a steady place to sleep or slept in a shelter (including now)?: No     No family history on file.    Review of patient's allergies indicates:   Allergen Reactions    Darvocet a500 [propoxyphene n-acetaminophen] Hives     Combination drug containing Darvocet, flexeril, & ibuprofen caused hives; does not know which ingredient caused the reaction    Flexeril [cyclobenzaprine] Hives     Combination drug containing Darvocet, flexeril, & ibuprofen caused hives; does not know which ingredient caused the reaction    Motrin [ibuprofen] Hives     Combination drug containing Darvocet, flexeril, & ibuprofen caused hives; does not know which ingredient caused the reaction    Hydrocodone-acetaminophen Other (See Comments)     Other reaction(s): NVD      Sulfamethoxazole-trimethoprim Other (See Comments)    Tramadol Other (See Comments)     Other reaction(s): NVD         Current Outpatient Medications   Medication Sig    cholecalciferol, vitamin D3, 10 mcg (400 unit) Cap capsule Take by mouth.    clopidogreL (PLAVIX) 75 mg tablet Take 75 mg by mouth.    dicyclomine (BENTYL) 20 mg tablet Take 20 mg by mouth 3 (three) times daily.    famotidine (PEPCID) 40 MG tablet Take 40 mg by mouth every evening.    meloxicam (MOBIC) 7.5 MG tablet Take 1 tablet (7.5 mg total) by mouth once daily.    metoprolol succinate (TOPROL-XL) 100 MG 24 hr tablet Take 100 mg by mouth.    metoprolol tartrate (LOPRESSOR) 50 MG tablet Take 50 mg by mouth 2 (two) times daily.    olmesartan-hydrochlorothiazide (BENICAR HCT) 40-25 mg per tablet Take 1 tablet by mouth once daily.    potassium chloride SA (K-DUR,KLOR-CON) 10 MEQ tablet Take 10 mEq by mouth once daily.    RESTASIS 0.05 % ophthalmic emulsion Place 1 drop into both eyes 2 (two) times daily.    rosuvastatin (CRESTOR) 10 MG tablet Take 10 mg by mouth.    thyroid, pork, (ARMOUR THYROID) 60 mg Tab Take 60 mg by mouth once  daily.     No current facility-administered medications for this visit.       REVIEW OF SYSTEMS:    GENERAL:  No weight loss, malaise or fevers.  HEENT:  Negative for frequent or significant headaches.  NECK:  Negative for lumps, goiter, pain and significant neck swelling.  RESPIRATORY:  Negative for cough, wheezing or shortness of breath.  CARDIOVASCULAR:  Negative for chest pain, leg swelling or palpitations.  GI:  Negative for abdominal discomfort, blood in stools or black stools or change in bowel habits.  MUSCULOSKELETAL:  See HPI.  SKIN:  Negative for lesions, rash, and itching.  PSYCH:  + for sleep disturbance, mood disorder and recent psychosocial stressors.  HEMATOLOGY/LYMPHOLOGY:  Negative for prolonged bleeding, bruising easily or swollen nodes.  NEURO:   No history of headaches, syncope, paralysis, seizures or tremors.  All other reviewed and negative other than HPI.    OBJECTIVE:    /83   Pulse 85   Resp 18   Wt 64.9 kg (143 lb 1.3 oz)   SpO2 98%   BMI 26.17 kg/m²     PHYSICAL EXAMINATION:    General appearance: Well appearing, in no acute distress, alert and oriented x3.  Psych:  Mood and affect appropriate.  Skin: Skin color, texture, turgor normal, no rashes or lesions, in both upper and lower body.  Head/face:  Normocephalic, atraumatic. No palpable lymph nodes.  Neck: No pain to palpation over the cervical paraspinous muscles. Spurling Negative. No pain with neck flexion, extension, or lateral flexion.   Cor: RRR  Pulm: CTA  GI:  Soft and non-tender.  Back: Straight leg raising in the sitting and supine positions is negative to radicular pain. Moderate  pain to palpation over the spine or costovertebral angles. Normal range of motion without pain reproduction. Positive axial loading test bilateral. Positive tenderness over both SIJ with positive thigh and sacral thrust test, Positive FABERE,Ganselin and Yeoman's test on the both side.negative FADIR  Extremities: Peripheral joint ROM is  full and pain free without obvious instability or laxity in all four extremities. No deformities, edema, or skin discoloration. Good capillary refill. Primary pain reproduced with femoral stretch test    Neuro: Bilateral upper and lower extremity coordination and muscle stretch reflexes are physiologic and symmetric.  Plantar response are downgoing. No loss of sensation is noted.  Gait: normal.    ASSESSMENT: 78 y.o. year old female with low back pain radiating into both legs, consistent with:     1. Spinal stenosis of lumbar region with neurogenic claudication  Procedure Order to Pain Management    celecoxib (CELEBREX) 100 MG capsule    gabapentin (NEURONTIN) 100 MG capsule      2. Lumbosacral spondylosis without myelopathy  Ambulatory referral/consult to Pain Clinic    X-Ray Lumbar Spine Ap Lateral w/Flex Ext    X-Ray Hips Bilateral 2 View Incl AP Pelvis      3. Lumbar radiculopathy  Procedure Order to Pain Management    celecoxib (CELEBREX) 100 MG capsule    gabapentin (NEURONTIN) 100 MG capsule      4. Other spondylosis, lumbosacral region              PLAN:   - I have stressed the importance of physical activity and a home exercise plan to help with pain and improve health.  - Patient can continue with medications for now since they are providing benefits, using them appropriately, and without side effects.  - MRI from [facility] reviewed today and shows multi-level moderate lumbar stenosis with significant lumbar spondylosis and degenerative discs.  See MRI report which is to be scanned in.  - Refer to PT. She wishes to change from outside PT to [facility].  - Continue physical therapy activities. States she intends to return to formal PT.  - Patient can continue with medications for now since they are providing benefits, using them appropriately, and without side effects.  - Continue with tylenol.  - Start gabapentin and titrate to 300 mg TID as tolerated  - Schedule for C[facility]  - X-rays of Lumbar spine with  flexion and extension  - X-rays of bilateral hips  - RTC after the above procedure  - Counseled patient regarding the importance of activity modification, constant sleeping habits, and physical therapy.    The above plan and management options were discussed at length with patient. Patient is in agreement with the above and verbalized understanding. It will be communicated with the referring physician via electronic record, fax, or mail.    Thai Pereira   I have personally reviewed the history and exam of this patient and agree with the resident/fellow/NPs note as stated above.    Yonas Pelaez MD    06/27/2024

## 2024-06-27 NOTE — PROGRESS NOTES
Chronic Pain - New Consult    Referring Physician: Wicho Low MD    Chief Complaint:   Chief Complaint   Patient presents with    Low-back Pain    Back Pain    Pain    Hip Pain    Leg Pain        SUBJECTIVE:    Caridad Hinds presents to the clinic for the evaluation of low back pain radiating into her bilateral legs (L>>R). The pain started  2023 ago following no particular trauma or incident and symptoms have been worsening.The pain is located in the right lateral low back/hip area and radiates to the lateral thigh, anterior shin and foot.  The pain is described as aching although it was previously numb and tingling with associated heron horses and is rated as 5/10. The pain is rated with a score of  5/10 on the BEST day and a score of 7/10 on the WORST day.  Symptoms interfere with daily activity and work. The pain is exacerbated by cold, working in the garden, cleaning house, being on feet all day. She used to work out regularly and had to stop because of the pain.  The pain is mitigated by heat,     Patient denies night fever/night sweats, urinary incontinence, bowel incontinence, significant weight loss, significant motor weakness, and loss of sensations.  She does endorse     Physical Therapy/Home Exercise: yes - 2024 with limited relief    Pain Disability Index Review:      2024     1:50 PM   Last 3 PDI Scores   Pain Disability Index (PDI) 38       Pain Medications:  Tylenol 1g daily     report:  Reviewed and consistent with medication use as prescribed.    Pain Procedures:   2015 - JUSTICE with Dr. Bobo    Imagin/1/2024 XRAY Report/ Interpretation :    AP pelvis x-ray was taken today. Indications low back pain and/or hip pain. Findings AP pelvis x-ray appears to be normal with no evidence of fractures or significant degenerative disease  AP and lateral x-rays of lumbar spine will performed today. Indications low back pain. Findings:  Grade 1 degenerative spondylolisthesis L4-5  marked disc space narrowing lower lumbar spine most marked multilevel    Lumber MRI 2023        Lumbar MRI report from 2015   was reviewed significant spinal stenosis at L4-5 and degenerative spondylolisthesis at L4-5 actual films were not available    Past Medical History:   Diagnosis Date    Hyperlipidemia     Hypertension     Thyroid disease      Past Surgical History:   Procedure Laterality Date    falopian Left 1986    ectopic pregnancy    THYROIDECTOMY, PARTIAL N/A 1972     Social History     Socioeconomic History    Marital status:    Tobacco Use    Smoking status: Never    Smokeless tobacco: Never   Substance and Sexual Activity    Alcohol use: No     Social Determinants of Health     Financial Resource Strain: Medium Risk (11/19/2023)    Received from [facility]    Overall Financial Resource Strain (CARDIA)     Difficulty of Paying Living Expenses: Somewhat hard   Food Insecurity: No Food Insecurity (11/19/2023)    Received from [facility]    Hunger Vital Sign     Worried About Running Out of Food in the Last Year: Never true     Ran Out of Food in the Last Year: Never true   Transportation Needs: No Transportation Needs (11/19/2023)    Received from [facility]    PRAPARE - Transportation     Lack of Transportation (Medical): No     Lack of Transportation (Non-Medical): No   Physical Activity: Insufficiently Active (11/19/2023)    Received from [facility]    Exercise Vital Sign     Days of Exercise per Week: 3 days     Minutes of Exercise per Session: 30 min   Stress: Stress Concern Present (11/19/2023)    Received from [facility]    [employer] - Occupational Stress Questionnaire     Feeling of Stress : To some extent   Housing Stability: Low Risk  (11/19/2023)    Received from [facility]    Housing Stability Vital Sign     Unable to Pay for Housing in the Last Year: No     Number of Places Lived in  the Last Year: 1     In the last 12 months, was there a time when you did not have a steady place to sleep or slept in a shelter (including now)?: No     No family history on file.    Review of patient's allergies indicates:   Allergen Reactions    Darvocet a500 [propoxyphene n-acetaminophen] Hives     Combination drug containing Darvocet, flexeril, & ibuprofen caused hives; does not know which ingredient caused the reaction    Flexeril [cyclobenzaprine] Hives     Combination drug containing Darvocet, flexeril, & ibuprofen caused hives; does not know which ingredient caused the reaction    Motrin [ibuprofen] Hives     Combination drug containing Darvocet, flexeril, & ibuprofen caused hives; does not know which ingredient caused the reaction    Hydrocodone-acetaminophen Other (See Comments)     Other reaction(s): NVD      Sulfamethoxazole-trimethoprim Other (See Comments)    Tramadol Other (See Comments)     Other reaction(s): NVD         Current Outpatient Medications   Medication Sig    cholecalciferol, vitamin D3, 10 mcg (400 unit) Cap capsule Take by mouth.    clopidogreL (PLAVIX) 75 mg tablet Take 75 mg by mouth.    dicyclomine (BENTYL) 20 mg tablet Take 20 mg by mouth 3 (three) times daily.    famotidine (PEPCID) 40 MG tablet Take 40 mg by mouth every evening.    meloxicam (MOBIC) 7.5 MG tablet Take 1 tablet (7.5 mg total) by mouth once daily.    metoprolol succinate (TOPROL-XL) 100 MG 24 hr tablet Take 100 mg by mouth.    metoprolol tartrate (LOPRESSOR) 50 MG tablet Take 50 mg by mouth 2 (two) times daily.    olmesartan-hydrochlorothiazide (BENICAR HCT) 40-25 mg per tablet Take 1 tablet by mouth once daily.    potassium chloride SA (K-DUR,KLOR-CON) 10 MEQ tablet Take 10 mEq by mouth once daily.    RESTASIS 0.05 % ophthalmic emulsion Place 1 drop into both eyes 2 (two) times daily.    rosuvastatin (CRESTOR) 10 MG tablet Take 10 mg by mouth.    thyroid, pork, (ARMOUR THYROID) 60 mg Tab Take 60 mg by mouth once  daily.     No current facility-administered medications for this visit.       REVIEW OF SYSTEMS:    GENERAL:  No weight loss, malaise or fevers.  HEENT:  Negative for frequent or significant headaches.  NECK:  Negative for lumps, goiter, pain and significant neck swelling.  RESPIRATORY:  Negative for cough, wheezing or shortness of breath.  CARDIOVASCULAR:  Negative for chest pain, leg swelling or palpitations.  GI:  Negative for abdominal discomfort, blood in stools or black stools or change in bowel habits.  MUSCULOSKELETAL:  See HPI.  SKIN:  Negative for lesions, rash, and itching.  PSYCH:  + for sleep disturbance, mood disorder and recent psychosocial stressors.  HEMATOLOGY/LYMPHOLOGY:  Negative for prolonged bleeding, bruising easily or swollen nodes.  NEURO:   No history of headaches, syncope, paralysis, seizures or tremors.  All other reviewed and negative other than HPI.    OBJECTIVE:    /83   Pulse 85   Resp 18   Wt 64.9 kg (143 lb 1.3 oz)   SpO2 98%   BMI 26.17 kg/m²     PHYSICAL EXAMINATION:    General appearance: Well appearing, in no acute distress, alert and oriented x3.  Psych:  Mood and affect appropriate.  Skin: Skin color, texture, turgor normal, no rashes or lesions, in both upper and lower body.  Head/face:  Normocephalic, atraumatic. No palpable lymph nodes.  Neck: No pain to palpation over the cervical paraspinous muscles. Spurling Negative. No pain with neck flexion, extension, or lateral flexion.   Cor: RRR  Pulm: CTA  GI:  Soft and non-tender.  Back: Straight leg raising in the sitting and supine positions is negative to radicular pain. Moderate  pain to palpation over the spine or costovertebral angles. Normal range of motion without pain reproduction. Positive axial loading test bilateral. Positive tenderness over both SIJ with positive thigh and sacral thrust test, Positive FABERE,Ganselin and Yeoman's test on the both side.negative FADIR  Extremities: Peripheral joint ROM is  full and pain free without obvious instability or laxity in all four extremities. No deformities, edema, or skin discoloration. Good capillary refill. Primary pain reproduced with femoral stretch test    Neuro: Bilateral upper and lower extremity coordination and muscle stretch reflexes are physiologic and symmetric.  Plantar response are downgoing. No loss of sensation is noted.  Gait: normal.    ASSESSMENT: 78 y.o. year old female with low back pain radiating into both legs, consistent with:     1. Spinal stenosis of lumbar region with neurogenic claudication  Procedure Order to Pain Management    celecoxib (CELEBREX) 100 MG capsule    gabapentin (NEURONTIN) 100 MG capsule      2. Lumbosacral spondylosis without myelopathy  Ambulatory referral/consult to Pain Clinic    X-Ray Lumbar Spine Ap Lateral w/Flex Ext    X-Ray Hips Bilateral 2 View Incl AP Pelvis      3. Lumbar radiculopathy  Procedure Order to Pain Management    celecoxib (CELEBREX) 100 MG capsule    gabapentin (NEURONTIN) 100 MG capsule      4. Other spondylosis, lumbosacral region              PLAN:   - I have stressed the importance of physical activity and a home exercise plan to help with pain and improve health.  - Patient can continue with medications for now since they are providing benefits, using them appropriately, and without side effects.  - MRI from [facility] reviewed today and shows multi-level moderate lumbar stenosis with significant lumbar spondylosis and degenerative discs.  See MRI report which is to be scanned in.  - Refer to PT. She wishes to change from outside PT to [facility].  - Continue physical therapy activities. States she intends to return to formal PT.  - Patient can continue with medications for now since they are providing benefits, using them appropriately, and without side effects.  - Continue with tylenol.  - Start gabapentin and titrate to 300 mg TID as tolerated  - Schedule for Caudal JUSTICE  - X-rays of Lumbar spine with  flexion and extension  - X-rays of bilateral hips  - RTC after the above procedure  - Counseled patient regarding the importance of activity modification, constant sleeping habits, and physical therapy.    The above plan and management options were discussed at length with patient. Patient is in agreement with the above and verbalized understanding. It will be communicated with the referring physician via electronic record, fax, or mail.    Thai Pereira   I have personally reviewed the history and exam of this patient and agree with the resident/fellow/NPs note as stated above.    Yonas Pelaez MD    06/27/2024

## 2024-06-28 DIAGNOSIS — M54.16 LUMBAR RADICULOPATHY: Primary | ICD-10-CM

## 2024-07-05 PROBLEM — M48.062 SPINAL STENOSIS OF LUMBAR REGION WITH NEUROGENIC CLAUDICATION: Status: ACTIVE | Noted: 2024-07-05

## 2024-07-05 PROBLEM — M47.897 OTHER SPONDYLOSIS, LUMBOSACRAL REGION: Status: ACTIVE | Noted: 2024-07-05

## 2024-07-24 ENCOUNTER — HOSPITAL ENCOUNTER (OUTPATIENT)
Facility: OTHER | Age: 79
Discharge: HOME | End: 2024-07-24
Attending: ANESTHESIOLOGY | Admitting: ANESTHESIOLOGY
Payer: MEDICARE

## 2024-07-24 ENCOUNTER — TELEPHONE (OUTPATIENT)
Dept: PAIN MEDICINE | Facility: CLINIC | Age: 79
End: 2024-07-24
Payer: MEDICARE

## 2024-07-24 VITALS
DIASTOLIC BLOOD PRESSURE: 69 MMHG | HEART RATE: 78 BPM | TEMPERATURE: 99 F | SYSTOLIC BLOOD PRESSURE: 127 MMHG | BODY MASS INDEX: 25.76 KG/M2 | OXYGEN SATURATION: 98 % | WEIGHT: 140 LBS | HEIGHT: 62 IN | RESPIRATION RATE: 16 BRPM

## 2024-07-24 DIAGNOSIS — G89.29 CHRONIC PAIN: ICD-10-CM

## 2024-07-24 DIAGNOSIS — M48.062 SPINAL STENOSIS OF LUMBAR REGION WITH NEUROGENIC CLAUDICATION: Primary | ICD-10-CM

## 2024-07-24 PROCEDURE — 25000003 PHARM REV CODE 250: Performed by: ANESTHESIOLOGY

## 2024-07-24 PROCEDURE — 25500020 PHARM REV CODE 255: Performed by: ANESTHESIOLOGY

## 2024-07-24 PROCEDURE — 62323 NJX INTERLAMINAR LMBR/SAC: CPT | Performed by: ANESTHESIOLOGY

## 2024-07-24 PROCEDURE — 62323 NJX INTERLAMINAR LMBR/SAC: CPT | Mod: ,,, | Performed by: ANESTHESIOLOGY

## 2024-07-24 PROCEDURE — 63600175 PHARM REV CODE 636 W HCPCS: Performed by: ANESTHESIOLOGY

## 2024-07-24 RX ORDER — LIDOCAINE HYDROCHLORIDE 10 MG/ML
INJECTION, SOLUTION EPIDURAL; INFILTRATION; INTRACAUDAL; PERINEURAL
Status: DISCONTINUED | OUTPATIENT
Start: 2024-07-24 | End: 2024-07-24 | Stop reason: HOSPADM

## 2024-07-24 RX ORDER — DEXAMETHASONE SODIUM PHOSPHATE 10 MG/ML
INJECTION INTRAMUSCULAR; INTRAVENOUS
Status: DISCONTINUED | OUTPATIENT
Start: 2024-07-24 | End: 2024-07-24 | Stop reason: HOSPADM

## 2024-07-24 RX ORDER — SODIUM CHLORIDE 9 MG/ML
INJECTION, SOLUTION INTRAVENOUS CONTINUOUS
Status: DISCONTINUED | OUTPATIENT
Start: 2024-07-24 | End: 2024-07-24 | Stop reason: HOSPADM

## 2024-07-24 RX ORDER — LIDOCAINE HYDROCHLORIDE 20 MG/ML
INJECTION, SOLUTION INFILTRATION; PERINEURAL
Status: DISCONTINUED | OUTPATIENT
Start: 2024-07-24 | End: 2024-07-24 | Stop reason: HOSPADM

## 2024-07-24 NOTE — DISCHARGE INSTRUCTIONS

## 2024-07-24 NOTE — TELEPHONE ENCOUNTER
----- Message from Yokasta Gareth sent at 7/24/2024  1:27 PM CDT -----  Regarding: Pt called to speak to the nurse regarding her care since having an test done today on her back today and pt would like a call back to see who she needs to schedule a follow up visit with  Patient Advice:    Pt called to speak to the nurse regarding her care since having an test done today on her back today and pt would like a call back to see who she needs to schedule a follow up visit with    Pt can be reached at  [phone] or [phone]   inserted over wire.

## 2024-07-24 NOTE — TELEPHONE ENCOUNTER
Patient had a procedure today and stated that the paper they gave her leaving the procedure area informed her to follow up with Dr.James Low at [facility]. Patient wants to know why she has to follow up with him instead of .

## 2024-07-24 NOTE — DISCHARGE SUMMARY
Discharge Note  Short Stay      SUMMARY     Admit Date: 7/24/2024    Attending Physician: Judith De Anda      Discharge Physician: Judith De Anda      Discharge Date: 7/24/2024 10:18 AM    Procedure(s) (LRB):  CAUDAL JUSTICE *PLAVIX CLEARANCE REQUESTED* (N/A)    Final Diagnosis: Lumbar radiculopathy [M54.16]    Disposition: Home or self care    Patient Instructions:   Current Discharge Medication List        CONTINUE these medications which have NOT CHANGED    Details   celecoxib (CELEBREX) 100 MG capsule Take 1 capsule (100 mg total) by mouth 2 (two) times daily.  Qty: 60 capsule, Refills: 2    Associated Diagnoses: Spinal stenosis of lumbar region with neurogenic claudication; Lumbar radiculopathy      cholecalciferol, vitamin D3, 10 mcg (400 unit) Cap capsule Take by mouth.      clopidogreL (PLAVIX) 75 mg tablet Take 75 mg by mouth.      dicyclomine (BENTYL) 20 mg tablet Take 20 mg by mouth 3 (three) times daily.      famotidine (PEPCID) 40 MG tablet Take 40 mg by mouth every evening.      gabapentin (NEURONTIN) 100 MG capsule Take 1 capsule (100 mg total) by mouth 3 (three) times daily. Start by taking 1 capsule at night for 5 days, then take 1 capsule at night and 1 capsule in the morning for 5 days. Then take 1 capsule 3 times per day. If adverse effects, return to last comfortable dose.  Qty: 90 capsule, Refills: 11    Associated Diagnoses: Spinal stenosis of lumbar region with neurogenic claudication; Lumbar radiculopathy      metoprolol succinate (TOPROL-XL) 100 MG 24 hr tablet Take 100 mg by mouth.      metoprolol tartrate (LOPRESSOR) 50 MG tablet Take 50 mg by mouth 2 (two) times daily.      olmesartan-hydrochlorothiazide (BENICAR HCT) 40-25 mg per tablet Take 1 tablet by mouth once daily.      potassium chloride SA (K-DUR,KLOR-CON) 10 MEQ tablet Take 10 mEq by mouth once daily.      RESTASIS 0.05 % ophthalmic emulsion Place 1 drop into both eyes 2 (two) times daily.      rosuvastatin (CRESTOR) 10 MG tablet  Take 10 mg by mouth.      thyroid, pork, (ARMOUR THYROID) 60 mg Tab Take 60 mg by mouth once daily.                 Discharge Diagnosis: Lumbar radiculopathy [M54.16]  Condition on Discharge: Stable with no complications to procedure   Diet on Discharge: Same as before.  Activity: as per instruction sheet.  Discharge to: Home with a responsible adult.  Follow up: 2-4 weeks       Please call my office or pager at [phone] if experienced any weakness or loss of sensation, fever > 101.5, pain uncontrolled with oral medications, persistent nausea/vomiting/or diarrhea, redness or drainage from the incisions, or any other worrisome concerns. If physician on call was not reached or could not communicate with our office for any reason please go to the nearest emergency department

## 2024-07-24 NOTE — OP NOTE
Caudal Epidural Steroid Injection with Catheter under Fluoroscopic Guidance    The procedure, risks, benefits, and options were discussed with the patient. There are no contraindications to the procedure. The patent expressed understanding and agreed to the procedure. Informed written consent was obtained prior to the start of the procedure and can be found in the patient's chart.    PATIENT NAME: Caridad Hinds   MRN: [id number]     DATE OF PROCEDURE: 07/24/2024    PROCEDURE: Caudal Epidural Steroid Injection under Fluoroscopic Guidance    PRE-OP DIAGNOSIS: Lumbar radiculopathy [M54.16] Lumbar radiculopathy [M54.16]    POST-OP DIAGNOSIS: Same    PHYSICIAN: Yonas Pelaez MD    ASSISTANTS: Judith De Anda DO      MEDICATIONS INJECTED: Preservative-free Decadron 10mg with 4cc of Lidocaine 1% MPF     LOCAL ANESTHETIC INJECTED: Xylocaine 2%     SEDATION: None    ESTIMATED BLOOD LOSS: None    COMPLICATIONS: None    TECHNIQUE: Time-out was performed to identify the patient and procedure to be performed. With the patient laying in a prone position, the surgical area was prepped and draped in the usual sterile fashion using ChloraPrep and a fenestrated drape. The injection site was determined under fluoroscopy guidance. Skin anesthesia was achieved by injecting Lidocaine 2% over the injection site. The sacrum and sacral cornua were then approached with a 16 gauge, 3.5 inch epidural needle that was introduced and advanced into the sacral hiatus under fluoroscopic guidance with AP, lateral and/or contralateral oblique imaging. After the needle passed through the sacrococcygeal ligament, the needle angle was lowered and the needle was advanced 1 cm. After negative aspiration of blood or CSF, and no evidence of paraesthesias, the catheter was threaded through the needle into the epidural space using live fluoroscopy, contrast dye Omnipaque (300mg/mL) was injected to confirm placement and there was no vascular runoff. Fluoroscopic  imaging in the AP and lateral views revealed a clear outline of the spinal nerve with proximal spread of agent through the caudal epidural space. Then 10 mL of the medication mixture listed above was injected slowly. Displacement of the radio opaque contrast after injection of the medication confirmed that the medication went into the area of the transforaminal spaces. The needles were removed and bleeding was nil. A sterile dressing was applied. No specimens collected. The patient tolerated the procedure well.       The patient was monitored after the procedure in the recovery area. They were given post-procedure and discharge instructions to follow at home. The patient was discharged in a stable condition.    Judith De Anda MD      I reviewed and edited the fellow's note. I conducted my own interview and physical examination. I agree with the findings. I was present and supervising all critical portions of the procedure.    Yonas Pelaez MD

## 2024-08-22 ENCOUNTER — OFFICE VISIT (OUTPATIENT)
Dept: PAIN MEDICINE | Facility: CLINIC | Age: 79
End: 2024-08-22
Attending: ANESTHESIOLOGY
Payer: MEDICARE

## 2024-08-22 VITALS
SYSTOLIC BLOOD PRESSURE: 156 MMHG | BODY MASS INDEX: 27.34 KG/M2 | WEIGHT: 149.5 LBS | HEART RATE: 83 BPM | RESPIRATION RATE: 16 BRPM | DIASTOLIC BLOOD PRESSURE: 90 MMHG

## 2024-08-22 DIAGNOSIS — M47.897 OTHER SPONDYLOSIS, LUMBOSACRAL REGION: ICD-10-CM

## 2024-08-22 DIAGNOSIS — M51.36 DDD (DEGENERATIVE DISC DISEASE), LUMBAR: ICD-10-CM

## 2024-08-22 DIAGNOSIS — M48.062 SPINAL STENOSIS OF LUMBAR REGION WITH NEUROGENIC CLAUDICATION: ICD-10-CM

## 2024-08-22 DIAGNOSIS — M54.16 LUMBAR RADICULOPATHY: Primary | ICD-10-CM

## 2024-08-22 PROCEDURE — 99213 OFFICE O/P EST LOW 20 MIN: CPT | Mod: PBBFAC | Performed by: ANESTHESIOLOGY

## 2024-08-22 PROCEDURE — 99999 PR PBB SHADOW E&M-EST. PATIENT-LVL III: CPT | Mod: PBBFAC,,, | Performed by: ANESTHESIOLOGY

## 2024-08-22 PROCEDURE — 99214 OFFICE O/P EST MOD 30 MIN: CPT | Mod: S$PBB,GC,, | Performed by: ANESTHESIOLOGY

## 2024-08-22 RX ORDER — GABAPENTIN 300 MG/1
300 CAPSULE ORAL 3 TIMES DAILY
Qty: 90 CAPSULE | Refills: 11 | Status: SHIPPED | OUTPATIENT
Start: 2024-08-22 | End: 2025-08-22

## 2024-08-22 NOTE — PROGRESS NOTES
Chronic Pain Established Patient           PCP: Haris Saba Jr., MD      CHIEF COMPLAINT: low back pain         INTERVAL HISTORY 8/22/24:   Patients returns for follow up low back pain with radiculopathy. Patient underwent caudal JUSTICE on 7/24/24. This provided 75% relief for four or five days, but her pain and stiffness returned. Patient sent for lumbar and hip XR, results below. Patient started on gabapentin and Celebrex which she continues to take with minimal benefit. Today, patient continues to have low back pain a 6/10, the pain occasionally radiates down the RLE laterally. Describes it is as constant aching, intermittently shooting.  Patient does note motor weakness to the right leg. She denies red flag symptoms.     Initial History 6/27/24:  Caridad Hinds presents to the clinic for the evaluation of low back pain radiating into her bilateral legs (L>>R). The pain started  8/2023 ago following no particular trauma or incident and symptoms have been worsening.The pain is located in the right lateral low back/hip area and radiates to the lateral thigh, anterior shin and foot.  The pain is described as aching although it was previously numb and tingling with associated heron horses and is rated as 5/10. The pain is rated with a score of  5/10 on the BEST day and a score of 7/10 on the WORST day.  Symptoms interfere with daily activity and work. The pain is exacerbated by cold, working in the garden, cleaning house, being on feet all day. She used to work out regularly and had to stop because of the pain.  The pain is mitigated by heat,      Patient denies night fever/night sweats, urinary incontinence, bowel incontinence, significant weight loss, significant motor weakness, and loss of sensations.  She does endorse         8/22/2024     3:09 PM   Last 3 PDI Scores   Pain  Disability Index (PDI) 38       6 weeks of Conservative therapy:  PT: 5/2024   HEP: participating daily     Pain Medications:  Tylenol 1g daily      report:  Reviewed and consistent with medication use as prescribed.     Pain Procedures:   2015 - JUSTICE with Dr. Bobo  7/24/24 - caudal JUSTICE      Imaging:   XR LUMBAR SPINE AP AND LAT WITH FLEX/EXT 6/27/24     CLINICAL HISTORY:  Spondylolisthesis, lumbar region     TECHNIQUE:  AP and lateral views as well as lateral flexion and extension images are performed through the lumbar spine.     COMPARISON:  11/01/2023     FINDINGS:  Lumbar vertebral body heights are maintained.     Disc space narrowing and endplate changes at nearly every level.  Facet arthropathy at a centrally every level..     Slight grade 1 anterolisthesis L3-4 and L4-5 with no significant change with flexion or extension.  Levoconvex curvature thoracolumbar spine.     Impression:     No acute osseous abnormality seen.     Multilevel degenerative change with grade 1 anterolisthesis L3-4 and L4-5.  No evidence for dynamic instability.    XR HIPS BILATERAL 2 VIEW INCL AP PELVIS 6/27/24     CLINICAL HISTORY:  Spondylolisthesis, lumbar region     TECHNIQUE:  AP view of the pelvis and frogleg lateral views of both hips were performed.     COMPARISON:  04/19/2016     FINDINGS:  Femoral heads are well located with respect to the acetabula.  Femoral heads maintain a normal round contour.  No acute fracture seen.  Mild osteophyte formation and narrowing of the femoroacetabular joints     Impression:     No acute osseous abnormality seen.     11/1/2024 XRAY Report/ Interpretation :    AP pelvis x-ray was taken today. Indications low back pain and/or hip pain. Findings AP pelvis x-ray appears to be normal with no evidence of fractures or significant degenerative disease  AP and lateral x-rays of lumbar spine will performed today. Indications low back pain. Findings:  Grade 1 degenerative spondylolisthesis L4-5  marked disc space narrowing lower lumbar spine most marked multilevel     Lumber MRI 2023          Lumbar MRI report from 2015   was reviewed significant spinal stenosis at L4-5 and degenerative spondylolisthesis at L4-5 actual films were not available         Past Medical History:   Diagnosis Date    Hyperlipidemia     Hypertension     Thyroid disease      Past Surgical History:   Procedure Laterality Date    EPIDURAL STEROID INJECTION N/A 7/24/2024    Procedure: CAUDAL JUSTICE *PLAVIX CLEARANCE REQUESTED*;  Surgeon: oYnas Pelaez MD;  [facility];  Service: Pain Management;  Laterality: N/A;  [phone]  2 WK F/U FR[facility]    falopian Left 1986    ectopic pregnancy    THYROIDECTOMY, PARTIAL N/A 1972     Social History     Socioeconomic History    Marital status:    Tobacco Use    Smoking status: Never    Smokeless tobacco: Never   Substance and Sexual Activity    Alcohol use: No     Social Determinants of Health     Financial Resource Strain: Medium Risk (11/19/2023)    Received from [facility] [employer] [facility]    Overall Financial Resource Strain (CARDIA)     Difficulty of Paying Living Expenses: Somewhat hard   Food Insecurity: No Food Insecurity (11/19/2023)    Received from [facility] [employer] [facility]    Hunger Vital Sign     Worried About Running Out of Food in the Last Year: Never true     Ran Out of Food in the Last Year: Never true   Transportation Needs: No Transportation Needs (11/19/2023)    Received from [facility] [employer] [facility]    PRAPARE - Transportation     Lack of Transportation (Medical): No     Lack of Transportation (Non-Medical): No   Physical Activity: Insufficiently Active (11/19/2023)    Received from [facility] [employer] [facility]    Exercise Vital Sign     Days of Exercise per Week: 3 days     Minutes of Exercise per Session: 30 min   Stress: Stress Concern Present (11/19/2023)    Received from [facility] [employer] [facility]    [employer] - Occupational Stress  Questionnaire     Feeling of Stress : To some extent   Housing Stability: Low Risk  (11/19/2023)    Received from [facility]    Housing Stability Vital Sign     Unable to Pay for Housing in the Last Year: No     Number of Places Lived in the Last Year: 1     In the last 12 months, was there a time when you did not have a steady place to sleep or slept in a shelter (including now)?: No     No family history on file.    Review of patient's allergies indicates:   Allergen Reactions    Darvocet a500 [propoxyphene n-acetaminophen] Hives     Combination drug containing Darvocet, flexeril, & ibuprofen caused hives; does not know which ingredient caused the reaction    Flexeril [cyclobenzaprine] Hives     Combination drug containing Darvocet, flexeril, & ibuprofen caused hives; does not know which ingredient caused the reaction    Motrin [ibuprofen] Hives     Combination drug containing Darvocet, flexeril, & ibuprofen caused hives; does not know which ingredient caused the reaction    Hydrocodone-acetaminophen Other (See Comments)     Other reaction(s): NVD      Sulfamethoxazole-trimethoprim Other (See Comments)    Tramadol Other (See Comments)     Other reaction(s): NVD         Current Outpatient Medications   Medication Sig    celecoxib (CELEBREX) 100 MG capsule Take 1 capsule (100 mg total) by mouth 2 (two) times daily.    cholecalciferol, vitamin D3, 10 mcg (400 unit) Cap capsule Take by mouth.    clopidogreL (PLAVIX) 75 mg tablet Take 75 mg by mouth.    dicyclomine (BENTYL) 20 mg tablet Take 20 mg by mouth 3 (three) times daily.    famotidine (PEPCID) 40 MG tablet Take 40 mg by mouth every evening.    gabapentin (NEURONTIN) 100 MG capsule Take 1 capsule (100 mg total) by mouth 3 (three) times daily. Start by taking 1 capsule at night for 5 days, then take 1 capsule at night and 1 capsule in the morning for 5 days. Then take 1 capsule 3 times per day. If adverse effects, return to last comfortable dose.    metoprolol  succinate (TOPROL-XL) 100 MG 24 hr tablet Take 100 mg by mouth.    metoprolol tartrate (LOPRESSOR) 50 MG tablet Take 50 mg by mouth 2 (two) times daily.    olmesartan-hydrochlorothiazide (BENICAR HCT) 40-25 mg per tablet Take 1 tablet by mouth once daily.    potassium chloride SA (K-DUR,KLOR-CON) 10 MEQ tablet Take 10 mEq by mouth once daily.    RESTASIS 0.05 % ophthalmic emulsion Place 1 drop into both eyes 2 (two) times daily.    rosuvastatin (CRESTOR) 10 MG tablet Take 10 mg by mouth.    thyroid, pork, (ARMOUR THYROID) 60 mg Tab Take 60 mg by mouth once daily.     No current facility-administered medications for this visit.         ROS:  GENERAL: No fever. No chills. No fatigue. Denies weight loss. Denies weight gain.  HEENT: Denies headaches. Denies vision change. Denies eye pain. Denies double vision. Denies ear pain.   CV: Denies chest pain.   PULM: Denies of shortness of breath.  GI: Denies constipation. No diarrhea. No abdominal pain. Denies nausea. Denies vomiting. No blood in stool.  HEME: Denies bleeding problems.  : Denies urgency. No painful urination. No blood in urine.  MS: Denies joint stiffness. Denies joint swelling.  Denies back pain.  SKIN: Denies rash.   NEURO: Denies seizures. No weakness.  PSYCH:  Denies difficulty sleeping. No anxiety. Denies depression. No suicidal thoughts.       VITALS:   Vitals:    08/22/24 1512   BP: (!) 156/90   Pulse: 83   Resp: 16   Weight: 67.8 kg (149 lb 7.6 oz)   PainSc:   6   PainLoc: Back         PHYSICAL EXAM:   General appearance: Well appearing, in no acute distress, alert and oriented x3.  Psych:  Mood and affect appropriate.  Skin: Skin color, texture, turgor normal, no rashes or lesions, in both upper and lower body.  Head/face:  Normocephalic, atraumatic. No palpable lymph nodes.  Neck: No pain to palpation over the cervical paraspinous muscles. Spurling Negative. No pain with neck flexion, extension, or lateral flexion.   Cor: RRR  Pulm: CTA  GI:  Soft  and non-tender.  Back: Straight leg raising in the sitting and supine positions is positive to radicular pain on the rt side. Moderate  pain to palpation over the spine or costovertebral angles. Normal range of motion without pain reproduction. Positive axial loading test bilateral. Positive tenderness over both SIJ with positive thigh and sacral thrust test, Positive FABERE,Ganselin and Yeoman's test on the both side.negative FADIR  Extremities: Peripheral joint ROM is full and pain free without obvious instability or laxity in all four extremities. No deformities, edema, or skin discoloration. Good capillary refill.    Neuro: Bilateral upper and lower extremity coordination and muscle stretch reflexes are physiologic and symmetric.  Plantar response are downgoing. No loss of sensation is noted.  Gait: antalgic      ASSESSMENT: 78 y.o. year old with low back pain with radiculopathy, consistent with:    1. Lumbar radiculopathy  Procedure Request Order for Pain Management      2. Other spondylosis, lumbosacral region        3. Spinal stenosis of lumbar region with neurogenic claudication        4. DDD (degenerative disc disease), lumbar                PLAN:  - I have stressed the importance of physical activity and a home exercise plan to help with pain and improve health.  - Patient can continue with medications for now since they are providing benefits, using them appropriately, and without side effects.  - Patient to take Gabapentin 300mg TID. Continue celebrex.   - Patient benefited briefly from caudal JUSTICE. She continues to have low back pain with R sided radiculopathy. Will schedule for R L4/5, L5/S1 TFESI.   - Counseled patient regarding the importance of activity modification, constant sleeping habits, and physical therapy.     I have personally reviewed the history and exam of this patient and agree with the resident/fellow/NPs note as stated above.    MD Jorge Alberto Mckeon  Liz  08/22/2024

## 2024-08-23 DIAGNOSIS — M54.16 LUMBAR RADICULOPATHY: Primary | ICD-10-CM

## 2024-09-04 ENCOUNTER — HOSPITAL ENCOUNTER (OUTPATIENT)
Facility: OTHER | Age: 79
Discharge: HOME | End: 2024-09-04
Attending: ANESTHESIOLOGY | Admitting: ANESTHESIOLOGY
Payer: MEDICARE

## 2024-09-04 VITALS
HEART RATE: 61 BPM | RESPIRATION RATE: 15 BRPM | OXYGEN SATURATION: 100 % | WEIGHT: 142 LBS | HEIGHT: 62 IN | DIASTOLIC BLOOD PRESSURE: 67 MMHG | SYSTOLIC BLOOD PRESSURE: 131 MMHG | TEMPERATURE: 98 F | BODY MASS INDEX: 26.13 KG/M2

## 2024-09-04 DIAGNOSIS — G89.29 CHRONIC PAIN: ICD-10-CM

## 2024-09-04 DIAGNOSIS — M54.16 LUMBAR RADICULOPATHY: Primary | ICD-10-CM

## 2024-09-04 PROCEDURE — 64483 NJX AA&/STRD TFRM EPI L/S 1: CPT | Mod: RT,,, | Performed by: ANESTHESIOLOGY

## 2024-09-04 PROCEDURE — 25000003 PHARM REV CODE 250: Performed by: ANESTHESIOLOGY

## 2024-09-04 PROCEDURE — 64484 NJX AA&/STRD TFRM EPI L/S EA: CPT | Mod: RT | Performed by: ANESTHESIOLOGY

## 2024-09-04 PROCEDURE — 64484 NJX AA&/STRD TFRM EPI L/S EA: CPT | Mod: RT,,, | Performed by: ANESTHESIOLOGY

## 2024-09-04 PROCEDURE — 63600175 PHARM REV CODE 636 W HCPCS: Performed by: ANESTHESIOLOGY

## 2024-09-04 PROCEDURE — 25500020 PHARM REV CODE 255: Performed by: ANESTHESIOLOGY

## 2024-09-04 PROCEDURE — 64483 NJX AA&/STRD TFRM EPI L/S 1: CPT | Mod: RT | Performed by: ANESTHESIOLOGY

## 2024-09-04 RX ORDER — ALPRAZOLAM 0.5 MG/1
0.5 TABLET, ORALLY DISINTEGRATING ORAL ONCE
Status: COMPLETED | OUTPATIENT
Start: 2024-09-04 | End: 2024-09-04

## 2024-09-04 RX ORDER — DEXAMETHASONE SODIUM PHOSPHATE 10 MG/ML
INJECTION INTRAMUSCULAR; INTRAVENOUS
Status: DISCONTINUED | OUTPATIENT
Start: 2024-09-04 | End: 2024-09-04 | Stop reason: HOSPADM

## 2024-09-04 RX ORDER — LIDOCAINE HYDROCHLORIDE 20 MG/ML
INJECTION, SOLUTION INFILTRATION; PERINEURAL
Status: DISCONTINUED | OUTPATIENT
Start: 2024-09-04 | End: 2024-09-04 | Stop reason: HOSPADM

## 2024-09-04 RX ORDER — SODIUM CHLORIDE 9 MG/ML
INJECTION, SOLUTION INTRAVENOUS CONTINUOUS
Status: DISCONTINUED | OUTPATIENT
Start: 2024-09-04 | End: 2024-09-04 | Stop reason: HOSPADM

## 2024-09-04 RX ORDER — LIDOCAINE HYDROCHLORIDE 10 MG/ML
INJECTION, SOLUTION EPIDURAL; INFILTRATION; INTRACAUDAL; PERINEURAL
Status: DISCONTINUED | OUTPATIENT
Start: 2024-09-04 | End: 2024-09-04 | Stop reason: HOSPADM

## 2024-09-04 RX ADMIN — ALPRAZOLAM 0.5 MG: 0.5 TABLET, ORALLY DISINTEGRATING ORAL at 09:09

## 2024-09-04 NOTE — DISCHARGE SUMMARY
Discharge Note  Short Stay      SUMMARY     Admit Date: 9/4/2024    Attending Physician: Yonas Pelaez MD    Discharge Physician: Yonas Pelaez MD      Discharge Date: 9/4/2024 9:28 AM    Procedure(s) (LRB):  LUMBAR TRANSFORAMNAL RIGHT L4/5 AND L5/S1 *PLAVIX CLEARANCE REQUESTED* (Right)    Final Diagnosis: Lumbar radiculopathy [M54.16]    Disposition: Home or self care    Patient Instructions:   Current Discharge Medication List        CONTINUE these medications which have NOT CHANGED    Details   celecoxib (CELEBREX) 100 MG capsule Take 1 capsule (100 mg total) by mouth 2 (two) times daily.  Qty: 60 capsule, Refills: 2    Associated Diagnoses: Spinal stenosis of lumbar region with neurogenic claudication; Lumbar radiculopathy      cholecalciferol, vitamin D3, 10 mcg (400 unit) Cap capsule Take by mouth.      clopidogreL (PLAVIX) 75 mg tablet Take 75 mg by mouth.      dicyclomine (BENTYL) 20 mg tablet Take 20 mg by mouth 3 (three) times daily.      famotidine (PEPCID) 40 MG tablet Take 40 mg by mouth every evening.      gabapentin (NEURONTIN) 300 MG capsule Take 1 capsule (300 mg total) by mouth 3 (three) times daily.  Qty: 90 capsule, Refills: 11      metoprolol succinate (TOPROL-XL) 100 MG 24 hr tablet Take 100 mg by mouth.      metoprolol tartrate (LOPRESSOR) 50 MG tablet Take 50 mg by mouth 2 (two) times daily.      olmesartan-hydrochlorothiazide (BENICAR HCT) 40-25 mg per tablet Take 1 tablet by mouth once daily.      potassium chloride SA (K-DUR,KLOR-CON) 10 MEQ tablet Take 10 mEq by mouth once daily.      RESTASIS 0.05 % ophthalmic emulsion Place 1 drop into both eyes 2 (two) times daily.      rosuvastatin (CRESTOR) 10 MG tablet Take 10 mg by mouth.      thyroid, pork, (ARMOUR THYROID) 60 mg Tab Take 60 mg by mouth once daily.                 Discharge Diagnosis: Lumbar radiculopathy [M54.16]  Condition on Discharge: Stable with no complications to procedure   Diet on Discharge: Same as  before.  Activity: as per instruction sheet.  Discharge to: Home with a responsible adult.  Follow up: 2-4 weeks       Please call my office or pager at [phone] if experienced any weakness or loss of sensation, fever > 101.5, pain uncontrolled with oral medications, persistent nausea/vomiting/or diarrhea, redness or drainage from the incisions, or any other worrisome concerns. If physician on call was not reached or could not communicate with our office for any reason please go to the nearest emergency department     Kayode Carpenter MD

## 2024-09-04 NOTE — H&P
HPI  Patient presenting for Procedure(s) (LRB):  LUMBAR TRANSFORAMNAL RIGHT L4/5 AND L5/S1 *PLAVIX CLEARANCE REQUESTED* (Right)     Patient on Anti-coagulation  Plavix 75mg daily, held for procedure    No health changes since previous encounter    Past Medical History:   Diagnosis Date    Hyperlipidemia     Hypertension     Thyroid disease      Past Surgical History:   Procedure Laterality Date    EPIDURAL STEROID INJECTION N/A 7/24/2024    Procedure: CAUDAL JUSTICE *PLAVIX CLEARANCE REQUESTED*;  Surgeon: Yonas Pelaez MD;  Location: [facility];  Service: Pain Management;  Laterality: N/A;  [phone]  2 WK F/U FR[facility]ES[facility]    falopian Left 1986    ectopic pregnancy    THYROIDECTOMY, PARTIAL N/A 1972     Review of patient's allergies indicates:   Allergen Reactions    Darvocet a500 [propoxyphene n-acetaminophen] Hives     Combination drug containing Darvocet, flexeril, & ibuprofen caused hives; does not know which ingredient caused the reaction    Flexeril [cyclobenzaprine] Hives     Combination drug containing Darvocet, flexeril, & ibuprofen caused hives; does not know which ingredient caused the reaction    Motrin [ibuprofen] Hives     Combination drug containing Darvocet, flexeril, & ibuprofen caused hives; does not know which ingredient caused the reaction    Hydrocodone-acetaminophen Other (See Comments)     Other reaction(s): NVD      Sulfamethoxazole-trimethoprim Other (See Comments)    Tramadol Other (See Comments)     Other reaction(s): NVD        No current facility-administered medications for this encounter.       PMHx, PSHx, Allergies, Medications reviewed in epic    ROS negative except pain complaints in HPI    OBJECTIVE:    There were no vitals taken for this visit.    PHYSICAL EXAMINATION:    GENERAL: Well appearing, in no acute distress, alert and oriented x3.  PSYCH:  Mood and affect appropriate.  SKIN: Skin color, texture, turgor normal, no rashes or lesions which will impact the procedure.  CV: RRR  with palpation of the radial artery.  PULM: No evidence of respiratory difficulty, symmetric chest rise. Clear to auscultation.  NEURO: Cranial nerves grossly intact.    Plan:    Proceed with procedure as planned Procedure(s) (LRB):  LUMBAR TRANSFORAMNAL RIGHT L4/5 AND L5/S1 *PLAVIX CLEARANCE REQUESTED* (Right)    Kayode Carpenter  09/04/2024

## 2024-09-04 NOTE — DISCHARGE INSTRUCTIONS

## 2024-09-04 NOTE — OP NOTE
Lumbar Transforaminal Epidural Steroid Injection under Fluoroscopic Guidance    The procedure, risks, benefits, and options were discussed with the patient. There are no contraindications to the procedure. The patent expressed understanding and agreed to the procedure. Informed written consent was obtained prior to the start of the procedure and can be found in the patient's chart.    PATIENT NAME: Caridad Hinds   MRN: [id number]     DATE OF PROCEDURE: 09/04/2024    PROCEDURE:  Right  L4/5 and L5/S1 Lumbar Transforaminal Epidural Steroid Injection under Fluoroscopic Guidance    PRE-OP DIAGNOSIS: Lumbar radiculopathy [M54.16] Lumbar radiculopathy [M54.16]    POST-OP DIAGNOSIS: Same    PHYSICIAN: Yonas Pelaez MD    ASSISTANTS: Kayode Carpenter MD  [facility]     MEDICATIONS INJECTED: Preservative-free Decadron 10mg with 5cc of Lidocaine 1% MPF     LOCAL ANESTHETIC INJECTED: Xylocaine 2%     SEDATION: None    ESTIMATED BLOOD LOSS: None    COMPLICATIONS: None    TECHNIQUE: Time-out was performed to identify the patient and procedure to be performed. With the patient laying in a prone position, the surgical area was prepped and draped in the usual sterile fashion using ChloraPrep and a fenestrated drape.The levels were determined under fluoroscopy guidance. Skin anesthesia was achieved by injecting Lidocaine 2% over the injection sites. The transforaminal spaces were then approached with a 22 gauge, 5 inch spinal quinke needle that was introduced under fluoroscopic guidance in the AP and Lateral views. Once the needle tip was in the area of the transforaminal space, and there was no blood, CSF or paraesthesias, contrast dye Omnipaque (300mg/mL) was injected to confirm placement and there was no vascular runoff. Fluoroscopic imaging in the AP and lateral views revealed a clear outline of the spinal nerve with proximal spread of agent through the neural foramen into the epidural space. 3 mL of the medication mixture  listed above was injected slowly at each site. Displacement of the radio opaque contrast after injection of the medication confirmed that the medication went into the area of the transforaminal spaces. The needles were removed and bleeding was nil. A sterile dressing was applied. No specimens collected. The patient tolerated the procedure well.     The patient was monitored after the procedure in the recovery area. They were given post-procedure and discharge instructions to follow at home. The patient was discharged in a stable condition.    Kayode Carpenter MD    I reviewed and edited the fellow's note. I conducted my own interview and physical examination. I agree with the findings. I was present and supervising all critical portions of the procedure.  Yonas Pelaez MD

## 2024-09-11 PROBLEM — M51.36 DDD (DEGENERATIVE DISC DISEASE), LUMBAR: Status: ACTIVE | Noted: 2024-09-11

## 2024-09-11 PROBLEM — M54.16 LUMBAR RADICULOPATHY: Status: ACTIVE | Noted: 2024-09-11

## 2024-09-11 PROBLEM — M51.369 DDD (DEGENERATIVE DISC DISEASE), LUMBAR: Status: ACTIVE | Noted: 2024-09-11

## 2024-09-18 ENCOUNTER — OFFICE VISIT (OUTPATIENT)
Dept: PAIN MEDICINE | Facility: CLINIC | Age: 79
End: 2024-09-18
Payer: MEDICARE

## 2024-09-18 VITALS
DIASTOLIC BLOOD PRESSURE: 82 MMHG | WEIGHT: 149.5 LBS | HEART RATE: 74 BPM | BODY MASS INDEX: 27.34 KG/M2 | SYSTOLIC BLOOD PRESSURE: 142 MMHG

## 2024-09-18 DIAGNOSIS — M54.16 LUMBAR RADICULOPATHY: ICD-10-CM

## 2024-09-18 DIAGNOSIS — M51.369 DDD (DEGENERATIVE DISC DISEASE), LUMBAR: Primary | ICD-10-CM

## 2024-09-18 DIAGNOSIS — M48.062 SPINAL STENOSIS OF LUMBAR REGION WITH NEUROGENIC CLAUDICATION: ICD-10-CM

## 2024-09-18 DIAGNOSIS — M46.1 BILATERAL SACROILIITIS: ICD-10-CM

## 2024-09-18 DIAGNOSIS — G89.4 CHRONIC PAIN SYNDROME: ICD-10-CM

## 2024-09-18 PROCEDURE — 99214 OFFICE O/P EST MOD 30 MIN: CPT | Mod: S$PBB,,,

## 2024-09-18 PROCEDURE — 99999 PR PBB SHADOW E&M-EST. PATIENT-LVL IV: CPT | Mod: PBBFAC,,,

## 2024-09-18 PROCEDURE — 99214 OFFICE O/P EST MOD 30 MIN: CPT | Mod: PBBFAC

## 2024-09-18 NOTE — PROGRESS NOTES
Interventional Pain Management - Established Visit  Follow-Up        PCP: Haris Saba Jr., MD      CHIEF COMPLAINT: low back pain       INTERVAL HISTORY 9/18/2024:   Caridad Hinds returns to clinic for follow-up after right L4/5 and L5/S1 TFESI on 9/4/2024. She reports 60% pain relief. She continues gabapentin and celebrex with some relief. She denies any side effects.  She denies recent health changes. She denies recent falls or trauma. She denies new onset fever/night sweats, urinary incontinence, bowel incontinence, significant weight changes, significant motor weakness or changes, or loss of sensations. Her pain today is 5/10.     INTERVAL HISTORY 8/22/24:   Patients returns for follow up low back pain with radiculopathy. Patient underwent caudal JUSTICE on 7/24/24. This provided 75% relief for four or five days, but her pain and stiffness returned. Patient sent for lumbar and hip XR, results below. Patient started on gabapentin and Celebrex which she continues to take with minimal benefit. Today, patient continues to have low back pain a 6/10, the pain occasionally radiates down the RLE laterally. Describes it is as constant aching, intermittently shooting.  Patient does note motor weakness to the right leg. She denies red flag symptoms.     Initial History 6/27/24:  Caridad Hinds presents to the clinic for the evaluation of low back pain radiating into her bilateral legs (L>>R). The pain started  8/2023 ago following no particular trauma or incident and symptoms have been worsening.The pain is located in the right lateral low back/hip area and radiates to the lateral thigh, anterior shin and foot.  The pain is described as aching although it was previously numb and tingling with associated heron horses and is rated as 5/10. The pain is rated with a score of  5/10 on the BEST day and a score of 7/10 on the WORST day.  Symptoms interfere with daily activity and work. The pain is exacerbated by cold, working in  the garden, cleaning house, being on feet all day. She used to work out regularly and had to stop because of the pain.  The pain is mitigated by heat,      Patient denies night fever/night sweats, urinary incontinence, bowel incontinence, significant weight loss, significant motor weakness, and loss of sensations.  She does endorse         9/18/2024    10:04 AM   Last 3 PDI Scores   Pain Disability Index (PDI) 35       6 weeks of Conservative therapy:  PT: 5/2024   HEP: participating daily     Pain Medications:  Tylenol 1g daily      report:  Reviewed and consistent with medication use as prescribed.     Pain Procedures:   2015 - JUSTICE with Dr. Bobo  7/24/2024 - caudal JUSTICE   9/4/2024 - right L4/5 and L5/S1 TFESI - 60% relief     Imaging:   XR LUMBAR SPINE AP AND LAT WITH FLEX/EXT 6/27/24     CLINICAL HISTORY:  Spondylolisthesis, lumbar region     TECHNIQUE:  AP and lateral views as well as lateral flexion and extension images are performed through the lumbar spine.     COMPARISON:  11/01/2023     FINDINGS:  Lumbar vertebral body heights are maintained.     Disc space narrowing and endplate changes at nearly every level.  Facet arthropathy at a centrally every level..     Slight grade 1 anterolisthesis L3-4 and L4-5 with no significant change with flexion or extension.  Levoconvex curvature thoracolumbar spine.     Impression:     No acute osseous abnormality seen.     Multilevel degenerative change with grade 1 anterolisthesis L3-4 and L4-5.  No evidence for dynamic instability.    XR HIPS BILATERAL 2 VIEW INCL AP PELVIS 6/27/24     CLINICAL HISTORY:  Spondylolisthesis, lumbar region     TECHNIQUE:  AP view of the pelvis and frogleg lateral views of both hips were performed.     COMPARISON:  04/19/2016     FINDINGS:  Femoral heads are well located with respect to the acetabula.  Femoral heads maintain a normal round contour.  No acute fracture seen.  Mild osteophyte formation and narrowing of the femoroacetabular  joints     Impression:     No acute osseous abnormality seen.     11/1/2024 XRAY Report/ Interpretation :    AP pelvis x-ray was taken today. Indications low back pain and/or hip pain. Findings AP pelvis x-ray appears to be normal with no evidence of fractures or significant degenerative disease  AP and lateral x-rays of lumbar spine will performed today. Indications low back pain. Findings:  Grade 1 degenerative spondylolisthesis L4-5 marked disc space narrowing lower lumbar spine most marked multilevel     Lumber MRI 2023          Lumbar MRI report from 2015   was reviewed significant spinal stenosis at L4-5 and degenerative spondylolisthesis at L4-5 actual films were not available         Past Medical History:   Diagnosis Date    Hyperlipidemia     Hypertension     Thyroid disease      Past Surgical History:   Procedure Laterality Date    EPIDURAL STEROID INJECTION N/A 7/24/2024    Procedure: CAUDAL JUSTICE *PLAVIX CLEARANCE REQUESTED*;  Surgeon: Yonas Pelaez MD;  Location: [facility];  Service: Pain Management;  Laterality: N/A;  [phone]  2 WK F/U FRANCESCA    falopian Left 1986    ectopic pregnancy    THYROIDECTOMY, PARTIAL N/A 1972    TRANSFORAMINAL EPIDURAL INJECTION OF STEROID Right 9/4/2024    Procedure: LUMBAR TRANSFORAMNAL RIGHT L4/5 AND L5/S1 *PLAVIX CLEARANCE REQUESTED*;  Surgeon: Yonas Pelaez MD;  Location: [facility];  Service: Pain Management;  Laterality: Right;  [phone]  2 WK F/U FRANCES[facility]     Social History     Socioeconomic History    Marital status:    Tobacco Use    Smoking status: Never    Smokeless tobacco: Never   Substance and Sexual Activity    Alcohol use: No     Social Determinants of Health     Financial Resource Strain: Medium Risk (11/19/2023)    Received from [facility], [facility]    Overall Financial Resource Strain (CARDIA)     Difficulty of Paying Living Expenses: Somewhat hard   Food Insecurity: No Food Insecurity (11/19/2023)    Received from [facility],  [facility]    Hunger Vital Sign     Worried About Running Out of Food in the Last Year: Never true     Ran Out of Food in the Last Year: Never true   Transportation Needs: No Transportation Needs (11/19/2023)    Received from [facility]    PRAPARE - Transportation     Lack of Transportation (Medical): No     Lack of Transportation (Non-Medical): No   Physical Activity: Insufficiently Active (11/19/2023)    Received from [facility] [employer]    Exercise Vital Sign     Days of Exercise per Week: 3 days     Minutes of Exercise per Session: 30 min   Stress: Stress Concern Present (11/19/2023)    Received from [facility]    [employer] [place] [employer] - Occupational Stress Questionnaire     Feeling of Stress : To some extent   Housing Stability: Low Risk  (11/19/2023)    Received from [facility]    Housing Stability Vital Sign     Unable to Pay for Housing in the Last Year: No     Number of Places Lived in the Last Year: 1     In the last 12 months, was there a time when you did not have a steady place to sleep or slept in a shelter (including now)?: No     No family history on file.    Review of patient's allergies indicates:   Allergen Reactions    Darvocet a500 [propoxyphene n-acetaminophen] Hives     Combination drug containing Darvocet, flexeril, & ibuprofen caused hives; does not know which ingredient caused the reaction    Flexeril [cyclobenzaprine] Hives     Combination drug containing Darvocet, flexeril, & ibuprofen caused hives; does not know which ingredient caused the reaction    Motrin [ibuprofen] Hives     Combination drug containing Darvocet, flexeril, & ibuprofen caused hives; does not know which ingredient caused the reaction    Hydrocodone-acetaminophen Other (See Comments)     Other reaction(s): NVD      Sulfamethoxazole-trimethoprim Other (See Comments)    Tramadol Other (See Comments)     Other reaction(s): NVD         Current Outpatient Medications    Medication Sig    celecoxib (CELEBREX) 100 MG capsule Take 1 capsule (100 mg total) by mouth 2 (two) times daily.    cholecalciferol, vitamin D3, 10 mcg (400 unit) Cap capsule Take by mouth.    clopidogreL (PLAVIX) 75 mg tablet Take 75 mg by mouth.    dicyclomine (BENTYL) 20 mg tablet Take 20 mg by mouth 3 (three) times daily.    famotidine (PEPCID) 40 MG tablet Take 40 mg by mouth every evening.    gabapentin (NEURONTIN) 300 MG capsule Take 1 capsule (300 mg total) by mouth 3 (three) times daily.    metoprolol succinate (TOPROL-XL) 100 MG 24 hr tablet Take 100 mg by mouth.    metoprolol tartrate (LOPRESSOR) 50 MG tablet Take 50 mg by mouth 2 (two) times daily.    olmesartan-hydrochlorothiazide (BENICAR HCT) 40-25 mg per tablet Take 1 tablet by mouth once daily.    potassium chloride SA (K-DUR,KLOR-CON) 10 MEQ tablet Take 10 mEq by mouth once daily.    RESTASIS 0.05 % ophthalmic emulsion Place 1 drop into both eyes 2 (two) times daily.    rosuvastatin (CRESTOR) 10 MG tablet Take 10 mg by mouth.    thyroid, pork, (ARMOUR THYROID) 60 mg Tab Take 60 mg by mouth once daily.     No current facility-administered medications for this visit.         ROS:  GENERAL: No fever. No chills. No fatigue. Denies weight loss. Denies weight gain.  HEENT: Denies headaches. Denies vision change. Denies eye pain. Denies double vision. Denies ear pain.   CV: Denies chest pain.   PULM: Denies of shortness of breath.  GI: Denies constipation. No diarrhea. No abdominal pain. Denies nausea. Denies vomiting. No blood in stool.  HEME: Denies bleeding problems.  : Denies urgency. No painful urination. No blood in urine.  MS: Denies joint stiffness. Denies joint swelling.  Denies back pain.  SKIN: Denies rash.   NEURO: Denies seizures. No weakness.  PSYCH:  Denies difficulty sleeping. No anxiety. Denies depression. No suicidal thoughts.       VITALS:   Vitals:    09/18/24 1003   BP: (!) 142/82   Pulse: 74   Weight: 67.8 kg (149 lb 7.6 oz)    PainSc:   5   PainLoc: Knee         PHYSICAL EXAM:   General appearance: Well appearing, in no acute distress, alert and oriented x3.  Psych:  Mood and affect appropriate.  Skin: Skin color, texture, turgor normal, no rashes or lesions, in both upper and lower body.  Head/face:  Normocephalic, atraumatic. No palpable lymph nodes.  Neck: No pain to palpation over the cervical paraspinous muscles. Spurling Negative. No pain with neck flexion, extension, or lateral flexion.   Cor: Rate regular.  Pulm: Bilateral chest pain. No apparent respiratory distress.   GI:  Soft and non-tender.  Back: Straight leg raising in the sitting and supine positions is negative to radicular pain on the rt side. Mild pain to palpation over the spine or costovertebral angles. Normal range of motion without pain reproduction. Positive axial loading test bilateral. Positive tenderness over both SIJ with positive thigh and sacral thrust test, Positive FABERE,Ganselin and Yeoman's test on the both side.negative FADIR  Extremities: Peripheral joint ROM is full and pain free without obvious instability or laxity in all four extremities. No deformities, edema, or skin discoloration. Good capillary refill.    Neuro: Bilateral upper and lower extremity coordination and muscle stretch reflexes are physiologic and symmetric.  Plantar response are downgoing. No loss of sensation is noted.  Gait: antalgic      ASSESSMENT: 78 y.o. year old with low back pain with radiculopathy, consistent with:    1. DDD (degenerative disc disease), lumbar  Ambulatory referral/consult to [facility]      2. Spinal stenosis of lumbar region with neurogenic claudication  Ambulatory referral/consult to [facility]      3. Lumbar radiculopathy  Ambulatory referral/consult to [facility]      4. Chronic pain syndrome        5. Bilateral sacroiliitis            PLAN:  - I have stressed the importance of physical activity and a home exercise plan to  help with pain and improve health.  - Patient can continue with medications for now since they are providing benefits, using them appropriately, and without side effects.  - Patient to take Gabapentin 300mg TID. Continue celebrex.   - She is s/p R L4/5, L5/S1 TFESI with 60% relief  - Referral to [facility]  - Counseled patient regarding the importance of activity modification, constant sleeping habits, and physical therapy.  - Consider bilateral SI joint injections after physical therapy    The above plan and management options were discussed at length with patient. Patient is in agreement with the above and verbalized understanding.     Lorena Beatty NP  09/18/2024

## 2024-10-15 ENCOUNTER — CLINICAL SUPPORT (OUTPATIENT)
Dept: REHABILITATION | Facility: OTHER | Age: 79
End: 2024-10-15
Payer: MEDICARE

## 2024-10-15 DIAGNOSIS — R29.898 DECREASED STRENGTH OF TRUNK AND BACK: ICD-10-CM

## 2024-10-15 DIAGNOSIS — M25.69 DECREASED RANGE OF MOTION OF TRUNK AND BACK: Primary | ICD-10-CM

## 2024-10-15 PROCEDURE — 97161 PT EVAL LOW COMPLEX 20 MIN: CPT

## 2024-10-15 PROCEDURE — 97110 THERAPEUTIC EXERCISES: CPT

## 2024-10-15 NOTE — PLAN OF CARE
[facility]  Physical Therapy Lumbar Evaluation      Name: Caridad Hinds  Clinic Number: [id number]    Therapy Diagnosis:   Encounter Diagnoses   Name Primary?    Decreased range of motion of trunk and back Yes    Decreased strength of trunk and back      Physician: Lorena Beatty NP    Physician Orders: PT Eval and Treat  Medical Diagnosis from Referral: M51.36 (ICD-10-CM) - DDD (degenerative disc disease), lumbar; M48.062 (ICD-10-CM) - Spinal stenosis of lumbar region with neurogenic claudication; M54.16 (ICD-10-CM) - Lumbar radiculopathy  Evaluation Date: 10/15/2024  Authorization Period Expiration: 9/18/25  Plan of Care Expiration: 12/24/2024  Reassessment Due: 11/15/2024  Visit # / Visits authorized: 1/1  MedX testing visit 1    Time In: 11:00 AM  Time Out: 12:00 PM  Total Billable Time: 60 minutes  INSURANCE and OUTCOMES: Fee for Service with FOTO Outcomes 1/3    Precautions: standard    Pattern of pain determined: PEN    Subjective     Date of onset: 1 year   History of current condition: Caridad reports chronic intermittent R > L low back pain with gradual onset with recent worsening of symptoms. Her back pain initially started in 2015 with relief of symptoms after epidural. At end of last year she started having pain again. She also notices numbness along lateral L hamstring. She also has radiating pain into bilateral buttocks mostly with standing. Patient also complains of stiffness mostly with gardening.   Pain worsen with coldness, standing, gardening, leaning forward and sit to stand transitional movements. Pain alleviated with walking, sitting, heating blanket, Celebrax and Gabapentin. Patient reports feeling wobbly or leg weakness while walking.      Medical History:   Past Medical History:   Diagnosis Date    Hyperlipidemia     Hypertension     Thyroid disease        Surgical History:   Caridad Hinds  has a past surgical history that includes Thyroidectomy,  partial (N/A, 1972); falopian (Left, 1986); Epidural steroid injection (N/A, 7/24/2024); and Transforaminal epidural injection of steroid (Right, 9/4/2024).    Medications:   Caridad has a current medication list which includes the following prescription(s): cholecalciferol (vitamin d3), clopidogrel, dicyclomine, famotidine, gabapentin, metoprolol succinate, metoprolol tartrate, olmesartan-hydrochlorothiazide, potassium chloride sa, restasis, rosuvastatin, and thyroid (pork).    Allergies:   Review of patient's allergies indicates:   Allergen Reactions    Darvocet a500 [propoxyphene n-acetaminophen] Hives     Combination drug containing Darvocet, flexeril, & ibuprofen caused hives; does not know which ingredient caused the reaction    Flexeril [cyclobenzaprine] Hives     Combination drug containing Darvocet, flexeril, & ibuprofen caused hives; does not know which ingredient caused the reaction    Motrin [ibuprofen] Hives     Combination drug containing Darvocet, flexeril, & ibuprofen caused hives; does not know which ingredient caused the reaction    Hydrocodone-acetaminophen Other (See Comments)     Other reaction(s): NVD      Sulfamethoxazole-trimethoprim Other (See Comments)    Tramadol Other (See Comments)     Other reaction(s): NVD          Imaging: X-ray No acute osseous abnormality seen.     Multilevel degenerative change with grade 1 anterolisthesis L3-4 and L4-5.  No evidence for dynamic instability.    Prior Therapy: physical therapy for neck and low back   Prior Treatment: physical therapy, epidural   Social History: 1 story home, 2 [address], lives with their son  Occupation: retired  Leisure: gardening, reading, treadmill 3x/week, gym        Prior Level of Function: active   Current Level of Function: not as active d/t pain   DME owned/used: no   Gym Membership: no, stopped 3 months ago d/t pain     Pain:  Current 0/10, worst 6/10, best 0/10   Location: R > L low back, radiating symptoms into kye  "buttocks  Description: Aching, Burning, Tight, Tingling, Numb, and Electric  Aggravating Factors: Standing, Walking, Flexing, Lifting, and Getting out of bed/chair  Easing Factors: pain medication, heating pad, injection, and rest  Disturbed Sleep: no     Pattern of pain questions:  1.  Where is your pain the worst? R > L low back   2.  Is your pain constant or intermittent? Intermittent   3.  Does bending forward make your typical pain worse? yes  4.  Since the start of your back pain, has there been a change in your bowel or bladder? no  5.  What can't you do now that you use to be able to do? Attend gym, gardening     Pts goals: "be able to get back to the gym and aerobics"     Red Flag Screening:   Cough/Sneeze Strain: (--)  Bladder/Bowel: (--)  Falls: (--)  Night pain: (--)  Unexplained weight loss: (--)  General health: good     Objective      Postural examination/scapula alignment: Rounded shoulder, Head forward, Slouched posture, and Increased kyphosis  Joint integrity: Firm end feeling  Skin integrity:WNL   Edema: None  Correction of posture: better with lumbar roll  Sitting: forward trunk posture       MOVEMENT LOSS - Lumbar   Norms ROM Loss Initial   Flexion Fingers touch toes, sacral angle >/= 70 deg, uniform spinal curvature, posterior weight shift  within functional limits; pain with radiating symptoms returning into extension    Extension ASIS surpasses toes, spine of scapulae surpasses heels, uniform spinal curve major loss; pain with radiating symptoms    Side glide Right  moderate loss; anterior (R) groin tightness   Side glide Left  within functional limits   Rotation Right PT observes contralateral shoulder within functional limits   Rotation Left PT observes contralateral shoulder within functional limits     Lower Extremity Strength  Right LE  Left LE    Hip flexion: 4/5 Hip flexion: 4/5   Hip extension:  4/5 Hip extension: 4/5   Hip abduction: 5/5 Hip abduction: 5/5   Hip adduction:  5/5 Hip " adduction:  5/5   Hip External Rotation 5/5 Hip External Rotation 5/5   Hip Internal rotation   5/5 Hip Internal rotation 5/5   Knee Flexion 5/5 Knee Flexion 5/5   Knee Extension 5/5 Knee Extension 5/5   Ankle dorsiflexion: 5/5 Ankle dorsiflexion: 5/5   Ankle plantarflexion: 5/5 Ankle plantarflexion: 5/5   Weakness detected along L3-L4 myotomes    GAIT:  Assistive Device used: none  Level of Assistance: independent  Patient displays the following gait deviations: no gait deviations observed.     Special Tests:   Test Name  Test Result   Prone Instability Test (+)   SI Joint Provocation Test (--)   Straight Leg Raise (--)   Neural Tension Test (--)   Crossed Straight Leg Raise (--)   Walking on toes Able   Walking on heels  Able     NEUROLOGICAL SCREENING:     Sensory deficits: numbness detected along L L5 dermatome  Tenderness to palpation detected along L3-L5 spinous process and kye paraspinals, kye piriformis     Reflexes:    Left Right   Patella Tendon 2+ 2+   Achilles Tendon 2+ 2+   Babinski  (--) (--)   Clonus (--) (--)     REPEATED TEST MOVEMENTS:    Baseline symptoms:  Repeated Flexion in Standing Increased pain slightly    Repeated Extension in Standing worse   Repeated Flexion in lying better   Repeated Extension in lying  worse       STATIC TESTS and other movements:   Prone lie worse   Prone lie on elbows worse   Sitting slouched  better   Sitting erect worse   Standing slouched better   Standing erect  worse   Lying prone in extension  worse   Long sitting   no effect   Sustained flexion no effect   Sustained prone using mat no effect       Isometric Testing on Med X equipment: Testing administered by PT    Test Initial Baseline Midpoint Final   Date      ROM  deg     Max Peak Torque       Min Peak Torque       Flex/Ext Ratio      % variance  normative data      % change from initial test N/A visit 1          OUTCOMES SELECTION:   Intake Outcome Measure for FOTO Lumbar Survey    Therapist reviewed FOTO  "scores for Caridad Hinds on 10/15/2024.   FOTO documents entered into [employer] - see Media section.    Intake Score: 47% functional ability (SHASHANK: 20)   Goal Score: 62% functional ability (SHASHANK: 10)           Treatment     Total Treatment time separate from Evaluation: 20 minutes    Caridad received therapeutic exercises to develop/improve posture, lumbar ROM, strength, and muscular endurance for 10 minutes including the following exercises:     LTR, 2x10x5"   hamstring stretch, x2x30"   bridge, 2x10x3"  SKTC, x6x10"  piriformis, x2x30"   PPT, 2x10x5"    NV: HEP review, BKFO c/ PPT, TA contraction + SLR c/ PB, paloff press, 90/90 heel taps     Written Home Exercises Provided: yes.    HEP AS FOLLOWS:  -LTR, hamstring stretch, bridge, SKTC, piriformis, PPT    Exercises were reviewed and Caridad was able to demonstrate them prior to the end of the session. Caridad demonstrated good  understanding of the education provided.     See EMR under Patient Instructions for exercises provided 10/15/2024.    Caridad received the following manual therapy techniques: Joint mobilizations and Soft tissue Mobilization were applied to the: low back for 00 minutes.     MedX Testing:  MedX testing to be performed next visit    .hbt    Therapeutic Education/Activity provided for 10 minutes:   - Patient was given an [facility] Visit 1 handout which discusses the following:  - what to expect in therapy  - an overview of the program, including health coaching and wellness  - importance of spinal hygiene, proper posture, lifting mechanics, sleep quality, and nutrition/hydration   - Jonathan roll trialed, recommended, and purchase information was provided.  - Patient received a handout regarding anticipated muscular soreness following the isometric test and strategies for management were reviewed with patient including stretching, using ice and scheduled rest.   - Patient received verbal education on the following:   - Healthy Back program   - " purpose of the isometric test  - safe progression of lumbar strengthening, wellness approach, and systemic strengthening.   - safe usage of MedX machine and testing protocols.    Caridad received cold pack for 00 minutes to low back in [place].    Assessment     Caridad is a 78 y.o. female referred to [facility] with a medical diagnosis of M51.36 (ICD-10-CM) - DDD (degenerative disc disease), lumbar; M48.062 (ICD-10-CM) - Spinal stenosis of lumbar region with neurogenic claudication; M54.16 (ICD-10-CM) - Lumbar radiculopathy. Pt presents with chronic R > L low back with weight bearing activities, transitional movements and extension based activities. Possible nerve compression with lumbar extension based on observation and subjective report. Noted exacerbation of symptoms with lumbar extension > flexion. However, patient appeared to have less pain with SIJ support provided by therapist with same movement indicating impaired stability. Therefore, plan to focus sessions on flexion based exercises to improve lumbar mobility and alleviate possible joint compression, stretches to reduce muscle tension and improve flexibility and lumbar/core stabilization activities to improve stability, endurance and encourage proper sequence of muscle activation during transitional movement. POC based on observation in clinic, subjective reports and objective measurements. Educated patient on importance of energy conservation techniques, performing exercises in pain free range of motion and importance of HEP compliance to assist with progression towards therapy goals. Plan to continue focusing sessions on improved stabilization, strength, endurance and reduce pain symptoms.     Pain Pattern: PEN       Pt prognosis is Good.     Pt will benefit from skilled outpatient Physical Therapy to address the deficits stated above and in the chart below, to provide pt/family education, and to maximize pt's level of independence. Based on the  above history and physical examination an active physical therapy program is recommended.      Plan of care discussed with patient: Yes  Pt's spiritual, cultural and educational needs considered and patient is agreeable to the plan of care and goals as stated below:     Anticipated Barriers for therapy: chronicity of symptoms    PT Evaluation Completed? Yes    Medical necessity is demonstrated by the following problem list:    History  Co-morbidities and personal factors that may impact the plan of care [x] LOW: no personal factors / co-morbidities  [] MODERATE: 1-2 personal factors / co-morbidities  [] HIGH: 3+ personal factors / co-morbidities    Moderate / High Support Documentation:   Co-morbidities affecting plan of care: none     Personal Factors:   age     Examination  Body Structures and Functions, activity limitations and participation restrictions that may impact the plan of care [x] LOW: addressing 1-2 elements  [] MODERATE: 3+ elements  [] HIGH: 4+ elements (please support below)    Moderate / High Support Documentation:     Clinical Presentation [x] LOW: stable  [] MODERATE: Evolving  [] HIGH: Unstable     Decision Making/ Complexity Score: low         GOALS: Pt is in agreement with the following goals.    Short term goals:  6 weeks or 10 visits   - Pt will demonstrate increased lumbar MedX ROM by at least 0-5 degrees from the initial ROM value with improvements noted in functional ROM and ability to perform ADLs. Appropriate and Ongoing  - Pt will demonstrate increased MedX average isometric strength value by 5-10% from initial test resulting in improved ability to perform bending, lifting, and carrying activities safely, confidently. Appropriate and Ongoing  - Pt will report a reduction in worst pain score by 1-2 points for improved tolerance for standing for 15 minutes or more. Appropriate and Ongoing  - Pt able to perform HEP correctly with minimal cueing or supervision from therapist to encourage  independent management of symptoms. Appropriate and Ongoing    Long term goals: 10 weeks or 20 visits   - Pt will demonstrate increased lumbar MedX ROM by at least 5-10 degrees from initial ROM value, resulting in improved ability to perform functional forward bending while standing and sitting. Appropriate and Ongoing  - Pt will demonstrate increased MedX average isometric strength value by 50-75% from initial test resulting in improved ability to perform bending, lifting, and carrying activities safely and confidently. Appropriate and Ongoing  - Pt to demonstrate ability to independently control and reduce their pain through posture positioning and mechanical movements throughout a typical day. Appropriate and Ongoing  - Pt will demonstrate reduced pain and improved functional outcomes as reported on the FOTO by reaching an intake score of >/= 62% functional ability in order to demonstrate subjective improvement in patient's condition. . Appropriate and Ongoing  - Pt will demonstrate independence with the HEP at discharge. Appropriate and Ongoing  - Patient will be able to complete forward bending while gardening with minimal to no complications indicating improved tolerance to activity. Appropriate and Ongoing    Plan     Outpatient physical therapy 2x week for 10 weeks or 20 visits to include the following:   - Patient education  - Therapeutic exercise  - Manual therapy  - Performance testing   - Neuromuscular Re-education  - Therapeutic activity   - Modalities    Pt may be seen by PTA as part of the rehabilitation team.     Therapist: Meena Hackett, PT  10/15/2024

## 2024-10-17 ENCOUNTER — CLINICAL SUPPORT (OUTPATIENT)
Dept: REHABILITATION | Facility: OTHER | Age: 79
End: 2024-10-17
Payer: MEDICARE

## 2024-10-17 DIAGNOSIS — M25.69 DECREASED RANGE OF MOTION OF TRUNK AND BACK: Primary | ICD-10-CM

## 2024-10-17 DIAGNOSIS — R29.898 DECREASED STRENGTH OF TRUNK AND BACK: ICD-10-CM

## 2024-10-17 PROCEDURE — 97110 THERAPEUTIC EXERCISES: CPT

## 2024-10-17 PROCEDURE — 97112 NEUROMUSCULAR REEDUCATION: CPT

## 2024-10-17 NOTE — PROGRESS NOTES
"[facility]  Physical Therapy Treatment Note     Name: Caridad Hinds  Clinic Number: [id number]    Therapy Diagnosis:   Encounter Diagnoses   Name Primary?    Decreased range of motion of trunk and back Yes    Decreased strength of trunk and back      Physician: Lorena Beatty NP    Visit Date: 10/17/2024    Physician Orders: PT Eval and Treat  Medical Diagnosis from Referral: M51.36 (ICD-10-CM) - DDD (degenerative disc disease), lumbar; M48.062 (ICD-10-CM) - Spinal stenosis of lumbar region with neurogenic claudication; M54.16 (ICD-10-CM) - Lumbar radiculopathy  Evaluation Date: 10/15/2024  Authorization Period Expiration: 12/31/2024  Plan of Care Expiration: 12/24/2024  Reassessment Due: 11/15/2024  Visit # / Visits authorized: 2/20  MedX testing visit 1    PTA Visit #: 0/5     Time In: 11:00  Time Out: 12:00  Total Billable Time: 55 minutes  INSURANCE and OUTCOMES: Fee for Service with FOTO Outcomes 1/3     Precautions: standard     Pattern of pain determined: PEN    Subjective     Caridad Hinds reports pain level at this time is about 4/10 and she has been doing the exercises at home.      Patient reports tolerating previous visit fair  Patient reports their pain to be 4/10 on a 0-10 scale with 0 being no pain and 10 being the worst pain imaginable.  Pain Location: R > L low back, radiating symptoms into kye buttocks      Occupation: retired  Leisure: gardening, reading, treadmill 3x/week, gym      Pts goals: "be able to get back to the gym and aerobics"     Objective      Lumbar  Isometric Testing on Med X equipment: Testing administered by PT    Test Initial Baseline Midpoint Final   Date 10/17/2024     ROM 0-48 deg     Max Peak Torque 71      Min Peak Torque 5      Flex/Ext Ratio 14.2:1     % variance  normative data 52     % change from initial test N/A visit 1       Starting weight 30     OUTCOMES SELECTION:   Intake Outcome Measure for FOTO Lumbar Survey   " "  Therapist reviewed FOTO scores for Caridad Hinds on 10/15/2024.   FOTO documents entered into [employer] - see Media section.     Intake Score: 47% functional ability (SHASHANK: 20)   Goal Score: 62% functional ability (SHASHANK: 10)            Treatment     Caridad received the treatments listed below:      Medical MedX Treatment as follows:  MedX testing performed day 2: Patient  received neuromuscular education to engage spinal musculature correctly for motor control and engagement of musculature for 15 minutes including the MedX exercise component and practice and standard testing. MedX dynamic exercise and baseline isometric test performed with instructions to guide the patient safely through the testing procedure. Patient instructed to perform isometric test correctly and safely while building to an optimal force with a pain-free effort. Patient also instructed that they should feel support/pressure from MedX restraints but no pain/discomfort, and encouraged to report any pain to therapist. Patient demonstrated appropriate understanding of information and tolerance of test.  Education regarding purpose of test, safety during test given, and reviewed possible more soreness and strategies.           10/17/2024    11:42 AM   [facility]   Visit Number 2   VAS Pain Rating 4   Treadmill Time (in min.) 5 min   Lumbar Extension Seat Pad 2   Femur Restraint 5   Top Dead Center 24   Counterweight 70   Lumbar Flexion 48   Lumbar Extension 0   Lumbar Peak Torque 71 ft. lbs.   Min Torque 5   Test Percent Below Normative Data 52 %   Z Lie (in min.) - HEAT 5           Caridad participated in neuromuscular re-education activities to improve balance, coordination, proprioception, motor control and/or posture for 00 minutes. The following activities were included:        Caridad participated in therapeutic exercises to develop strength, endurance, ROM, flexibility, posture, and core stabilization for 45 minutes including:    LTR, x10x5" " "  hamstring stretch, x2x30"   bridge, x15x4"  SKTC, x10x3"  piriformis, x2x30"   PPT, x10x5"     NV: HEP review, BKFO c/ PPT, TA contraction + SLR c/ PB, paloff press, 90/90 heel taps     Peripheral muscle strengthening which included one set of 15-20 repetitions at a slow and controlled 10-13 second per rep pace focused on strengthening supporting musculature in order to improve body mechanics and functional mobility. Patient and therapist focused on proper form during treatment to ensure optimal strengthening of each targeted muscle group.  Machines utilized included:Torso rotation, Leg Ext, Leg Curl, Chest Press, and Rowing  To be added next visit:Triceps, Biceps, Hip Abd, and Leg Press      Caridad participated in dynamic functional therapeutic activities to improve functional performance and simulate household and community activities for 00  minutes. The following activities were included:      Caridad received manual therapy techniques for 00  minutes. The following activities were included:      Pt given HEAT pack for 5 minutes to low back in supine.    Patient Education and Home Exercises     Home exercises include:  LTR, hamstring stretch, bridge, SKTC, piriformis, PPT   Cardio program (V5): -  Lifting education (V11): -  Posture/Lumbar roll: TBD  Fridge Magnet Discharge handout (date given): -  Equipment at home/gym membership: no, stopped 3 months ago d/t pain       Education provided:   - Patient received education in benefits of progressing mobility and strengthening gradually  - Pt educated in proper body mechanics and lifting techniques.  - Discussed exertion scale, slow progressive resistance protocol to promote safe and healthy strengthening of supportive musculature  by performing 15-20 reps, 7 sec per rep, and increasing weight by 5 % at 20 reps only if there ex done safely, slowly, using correct musculature, exertion and no pain.  Patient expressed understanding.  -Pt educated on strategies to " "safely perform examination and exercise using "Stop Light" analogy to describe how to avoid or stop irritating motions, proceed with caution with some movements, and progress positive exercises.     Written Home Exercises Provided: Patient instructed to cont prior HEP.  Exercises were reviewed and Caridad was able to demonstrate them prior to the end of the session.  Caridad demonstrated good  understanding of the education provided.     See EMR under Patient Instructions for exercises provided 10/17/2024.    Assessment     Caridad presents to second healthy back visit reporting moderate low back pain. Continued with HEP stretches and pt was able to complete with max cues. Pt encouraged to continue with exercises at home and will progress next visit as tolerated.  Pt completed lumbar MedX testing and is 52% below averages. Pt was also able to complete half of the peripheral strengthening exercises without increased discomfort and will complete the complete circuit next visit as tolerated.    Patient is making good progress towards established goals.  Pt will continue to benefit from skilled outpatient physical therapy to address the deficits stated in the impairment chart, provide pt/family education and to maximize pt's level of independence in the home and community environment.     Anticipated Barriers for therapy: chronicity of symptoms   Pt's spiritual, cultural and educational needs considered and pt agreeable to plan of care and goals as stated below:     GOALS: Pt is in agreement with the following goals.     Short term goals:  6 weeks or 10 visits   - Pt will demonstrate increased lumbar MedX ROM by at least 0-5 degrees from the initial ROM value with improvements noted in functional ROM and ability to perform ADLs. Appropriate and Ongoing  - Pt will demonstrate increased MedX average isometric strength value by 5-10% from initial test resulting in improved ability to perform bending, lifting, and carrying " activities safely, confidently. Appropriate and Ongoing  - Pt will report a reduction in worst pain score by 1-2 points for improved tolerance for standing for 15 minutes or more. Appropriate and Ongoing  - Pt able to perform HEP correctly with minimal cueing or supervision from therapist to encourage independent management of symptoms. Appropriate and Ongoing     Long term goals: 10 weeks or 20 visits   - Pt will demonstrate increased lumbar MedX ROM by at least 5-10 degrees from initial ROM value, resulting in improved ability to perform functional forward bending while standing and sitting. Appropriate and Ongoing  - Pt will demonstrate increased MedX average isometric strength value by 50-75% from initial test resulting in improved ability to perform bending, lifting, and carrying activities safely and confidently. Appropriate and Ongoing  - Pt to demonstrate ability to independently control and reduce their pain through posture positioning and mechanical movements throughout a typical day. Appropriate and Ongoing  - Pt will demonstrate reduced pain and improved functional outcomes as reported on the FOTO by reaching an intake score of >/= 62% functional ability in order to demonstrate subjective improvement in patient's condition. . Appropriate and Ongoing  - Pt will demonstrate independence with the HEP at discharge. Appropriate and Ongoing  - Patient will be able to complete forward bending while gardening with minimal to no complications indicating improved tolerance to activity. Appropriate and Ongoing       Plan     Continue with established Plan of Care towards established PT goals.     Therapist: Mk Jose, PT  10/17/2024

## 2024-10-23 NOTE — PROGRESS NOTES
"[facility]  Physical Therapy Treatment Note     Name: Caridad Hinds  Clinic Number: [id number]    Therapy Diagnosis:   Encounter Diagnoses   Name Primary?    Decreased range of motion of trunk and back Yes    Decreased strength of trunk and back        Physician: Lorena Beatty NP    Visit Date: 10/24/2024    Physician Orders: PT Eval and Treat  Medical Diagnosis from Referral: M51.36 (ICD-10-CM) - DDD (degenerative disc disease), lumbar; M48.062 (ICD-10-CM) - Spinal stenosis of lumbar region with neurogenic claudication; M54.16 (ICD-10-CM) - Lumbar radiculopathy  Evaluation Date: 10/15/2024  Authorization Period Expiration: 12/31/2024  Plan of Care Expiration: 12/24/2024  Reassessment Due: 11/15/2024  Visit # / Visits authorized: 2/20  MedX testing visit 1    PTA Visit #: 1/5     Time In: 0915  Time Out: 1015  Total Billable Time: 55 minutes  INSURANCE and OUTCOMES: Fee for Service with FOTO Outcomes 1/3     Precautions: standard     Pattern of pain determined: PEN    Subjective     Caridad Hinds reports pain level at this time is about 0/10 and she has been doing the exercises at home.      Patient reports tolerating previous visit fair  Patient reports their pain to be 1/10 on a 0-10 scale with 0 being no pain and 10 being the worst pain imaginable.  Pain Location: R > L low back, radiating symptoms into kye buttocks      Occupation: retired  Leisure: gardening, reading, treadmill 3x/week, gym      Pts goals: "be able to get back to the gym and aerobics"     Objective      Lumbar  Isometric Testing on Med X equipment: Testing administered by PT    Test Initial Baseline Midpoint Final   Date 10/17/2024     ROM 0-48 deg     Max Peak Torque 71      Min Peak Torque 5      Flex/Ext Ratio 14.2:1     % variance  normative data 52     % change from initial test N/A visit 1       Starting weight 30     OUTCOMES SELECTION:   Intake Outcome Measure for FOTO Lumbar Survey   " "  Therapist reviewed FOTO scores for Caridad Hinds on 10/15/2024.   FOTO documents entered into [employer] - see Media section.     Intake Score: 47% functional ability (SHASHANK: 20)   Goal Score: 62% functional ability (SHASHANK: 10)            Treatment     Caridad received the treatments listed below:      Medical MedX Treatment as follows:  MedX testing performed day 2: Patient  received neuromuscular education to engage spinal musculature correctly for motor control and engagement of musculature for 15 minutes including the MedX exercise component and practice and standard testing. MedX dynamic exercise and baseline isometric test performed with instructions to guide the patient safely through the testing procedure. Patient instructed to perform isometric test correctly and safely while building to an optimal force with a pain-free effort. Patient also instructed that they should feel support/pressure from MedX restraints but no pain/discomfort, and encouraged to report any pain to therapist. Patient demonstrated appropriate understanding of information and tolerance of test.  Education regarding purpose of test, safety during test given, and reviewed possible more soreness and strategies.               Caridad participated in neuromuscular re-education activities to improve balance, coordination, proprioception, motor control and/or posture for 00 minutes. The following activities were included:        Caridad participated in therapeutic exercises to develop strength, endurance, ROM, flexibility, posture, and core stabilization for 45 minutes including:    LTR, x10x5"   Hamstring stretch, x2x30"   Bridge, x15x4"  SKTC, x10x3"  Piriformis, x2x30"   PPT, x10x5"  +BKFO c/ PPT,  +TA contraction + SLR c/ PB 2x10       NV: paloff press, 90/90 heel taps (suggestions for eval PT)    Peripheral muscle strengthening which included one set of 15-20 repetitions at a slow and controlled 10-13 second per rep pace focused on strengthening " "supporting musculature in order to improve body mechanics and functional mobility. Patient and therapist focused on proper form during treatment to ensure optimal strengthening of each targeted muscle group.  Machines utilized included:Torso rotation, Leg Ext, Leg Curl, Chest Press, and Rowing  To be added next visit:Triceps, Biceps, Hip Abd, and Leg Press      Caridad participated in dynamic functional therapeutic activities to improve functional performance and simulate household and community activities for 00  minutes. The following activities were included:      Caridad received manual therapy techniques for 00  minutes. The following activities were included:      Pt given HEAT pack for 5 minutes to low back in supine.    Patient Education and Home Exercises     Home exercises include:  LTR, hamstring stretch, bridge, SKTC, piriformis, PPT   Cardio program (V5): -  Lifting education (V11): -  Posture/Lumbar roll: TBD  Fridge Magnet Discharge handout (date given): -  Equipment at home/gym membership: no, stopped 3 months ago d/t pain       Education provided:   - Patient received education in benefits of progressing mobility and strengthening gradually  - Pt educated in proper body mechanics and lifting techniques.  - Discussed exertion scale, slow progressive resistance protocol to promote safe and healthy strengthening of supportive musculature  by performing 15-20 reps, 7 sec per rep, and increasing weight by 5 % at 20 reps only if there ex done safely, slowly, using correct musculature, exertion and no pain.  Patient expressed understanding.  -Pt educated on strategies to safely perform examination and exercise using "Stop Light" analogy to describe how to avoid or stop irritating motions, proceed with caution with some movements, and progress positive exercises.     Written Home Exercises Provided: Patient instructed to cont prior HEP.  Exercises were reviewed and Caridad was able to demonstrate them prior to " the end of the session.  Caridad demonstrated good  understanding of the education provided.     See EMR under Patient Instructions for exercises provided 10/17/2024.    Assessment     Caridad presents to third healthy back visit without low back pain at this time. Continued with HEP stretches and pt was able to complete with max cues especially PPT. Pt encouraged to continue with exercises at home. Pt progressed by adding core activation and stability ex which she tolerated well. Neuro muscular re-ed of spine musculature initiated with Lumbar MedX.  Pt was also able to complete  15 reps at 50% of max torque from test  peripheral strengthening exercises without increased discomfort and will complete the complete circuit next visit as tolerated.    Patient is making good progress towards established goals.  Pt will continue to benefit from skilled outpatient physical therapy to address the deficits stated in the impairment chart, provide pt/family education and to maximize pt's level of independence in the home and community environment.     Anticipated Barriers for therapy: chronicity of symptoms   Pt's spiritual, cultural and educational needs considered and pt agreeable to plan of care and goals as stated below:     GOALS: Pt is in agreement with the following goals.     Short term goals:  6 weeks or 10 visits   - Pt will demonstrate increased lumbar MedX ROM by at least 0-5 degrees from the initial ROM value with improvements noted in functional ROM and ability to perform ADLs. Appropriate and Ongoing  - Pt will demonstrate increased MedX average isometric strength value by 5-10% from initial test resulting in improved ability to perform bending, lifting, and carrying activities safely, confidently. Appropriate and Ongoing  - Pt will report a reduction in worst pain score by 1-2 points for improved tolerance for standing for 15 minutes or more. Appropriate and Ongoing  - Pt able to perform HEP correctly with minimal  cueing or supervision from therapist to encourage independent management of symptoms. Appropriate and Ongoing     Long term goals: 10 weeks or 20 visits   - Pt will demonstrate increased lumbar MedX ROM by at least 5-10 degrees from initial ROM value, resulting in improved ability to perform functional forward bending while standing and sitting. Appropriate and Ongoing  - Pt will demonstrate increased MedX average isometric strength value by 50-75% from initial test resulting in improved ability to perform bending, lifting, and carrying activities safely and confidently. Appropriate and Ongoing  - Pt to demonstrate ability to independently control and reduce their pain through posture positioning and mechanical movements throughout a typical day. Appropriate and Ongoing  - Pt will demonstrate reduced pain and improved functional outcomes as reported on the FOTO by reaching an intake score of >/= 62% functional ability in order to demonstrate subjective improvement in patient's condition. . Appropriate and Ongoing  - Pt will demonstrate independence with the HEP at discharge. Appropriate and Ongoing  - Patient will be able to complete forward bending while gardening with minimal to no complications indicating improved tolerance to activity. Appropriate and Ongoing       Plan     Continue with established Plan of Care towards established PT goals.     Therapist: Ana Olivares, PTA  10/23/2024

## 2024-10-24 ENCOUNTER — CLINICAL SUPPORT (OUTPATIENT)
Dept: REHABILITATION | Facility: OTHER | Age: 79
End: 2024-10-24
Payer: MEDICARE

## 2024-10-24 DIAGNOSIS — R29.898 DECREASED STRENGTH OF TRUNK AND BACK: ICD-10-CM

## 2024-10-24 DIAGNOSIS — M25.69 DECREASED RANGE OF MOTION OF TRUNK AND BACK: Primary | ICD-10-CM

## 2024-10-24 PROCEDURE — 97110 THERAPEUTIC EXERCISES: CPT | Mod: CQ

## 2024-10-24 PROCEDURE — 97112 NEUROMUSCULAR REEDUCATION: CPT | Mod: CQ

## 2024-10-31 ENCOUNTER — CLINICAL SUPPORT (OUTPATIENT)
Dept: REHABILITATION | Facility: OTHER | Age: 79
End: 2024-10-31
Payer: MEDICARE

## 2024-10-31 DIAGNOSIS — R29.898 DECREASED STRENGTH OF TRUNK AND BACK: ICD-10-CM

## 2024-10-31 DIAGNOSIS — M25.69 DECREASED RANGE OF MOTION OF TRUNK AND BACK: Primary | ICD-10-CM

## 2024-10-31 PROCEDURE — 97112 NEUROMUSCULAR REEDUCATION: CPT | Mod: CQ

## 2024-10-31 PROCEDURE — 97110 THERAPEUTIC EXERCISES: CPT | Mod: CQ

## 2024-11-04 NOTE — PROGRESS NOTES
"[facility]  Physical Therapy Treatment Note     Name: Caridad Hinds  Clinic Number: [id number]    Therapy Diagnosis:   Encounter Diagnoses   Name Primary?    Decreased range of motion of trunk and back Yes    Decreased strength of trunk and back            Physician: Lorena Beatty NP    Visit Date: 11/5/2024    Physician Orders: PT Eval and Treat  Medical Diagnosis from Referral: M51.36 (ICD-10-CM) - DDD (degenerative disc disease), lumbar; M48.062 (ICD-10-CM) - Spinal stenosis of lumbar region with neurogenic claudication; M54.16 (ICD-10-CM) - Lumbar radiculopathy  Evaluation Date: 10/15/2024  Authorization Period Expiration: 12/31/2024  Plan of Care Expiration: 12/24/2024  Reassessment Due: 11/15/2024  Visit # / Visits authorized: 5/20  MedX testing visit 1    PTA Visit #: 0/5     Time In: 11:00 a  Time Out: 12:00 pm  Total Billable Time: 55 minutes  INSURANCE and OUTCOMES: Fee for Service with FOTO Outcomes 1/3     Precautions: standard     Pattern of pain determined: PEN    Subjective     Caridad Hinds reports feeling fair with moderate pain rating.     Patient reports tolerating previous visit fair  Patient reports their pain to be 4/10 on a 0-10 scale with 0 being no pain and 10 being the worst pain imaginable.  Pain Location: R > L low back, radiating symptoms into kye buttocks      Occupation: retired  Leisure: gardening, reading, treadmill 3x/week, gym      Pts goals: "be able to get back to the gym and aerobics"     Objective      Lumbar  Isometric Testing on Med X equipment: Testing administered by PT    Test Initial Baseline Midpoint Final   Date 10/17/2024     ROM 0-48 deg     Max Peak Torque 71      Min Peak Torque 5      Flex/Ext Ratio 14.2:1     % variance  normative data 52     % change from initial test N/A visit 1       Starting weight 30     OUTCOMES SELECTION:   Intake Outcome Measure for FOTO Lumbar Survey     Therapist reviewed FOTO scores for Caridad NOHEMY" "Guzman on 10/15/2024.   FOTO documents entered into [employer] - see Media section.     Intake Score: 47% functional ability (SHASHANK: 20)   Goal Score: 62% functional ability (SHASHANK: 10)            Treatment     Caridad received the treatments listed below:      Medical MedX Treatment as follows:  MedX testing performed day 2: Patient  received neuromuscular education to engage spinal musculature correctly for motor control and engagement of musculature for 15 minutes including the MedX exercise component and practice and standard testing. MedX dynamic exercise and baseline isometric test performed with instructions to guide the patient safely through the testing procedure. Patient instructed to perform isometric test correctly and safely while building to an optimal force with a pain-free effort. Patient also instructed that they should feel support/pressure from MedX restraints but no pain/discomfort, and encouraged to report any pain to therapist. Patient demonstrated appropriate understanding of information and tolerance of test.  Education regarding purpose of test, safety during test given, and reviewed possible more soreness and strategies.           11/5/2024    10:44 AM   [facility]   Visit Number 5   Flexion in Lying 10   Lumbar Weight 30 lbs   Repetitions 20   Rating of Perceived Exertion 4   Ice - Z Lie (in min.) 5           Caridad participated in neuromuscular re-education activities to improve balance, coordination, proprioception, motor control and/or posture for 00 minutes. The following activities were included:        Caridad participated in therapeutic exercises to develop strength, endurance, ROM, flexibility, posture, and core stabilization for 45 minutes including:    LTR, x10x5"   Hamstring stretch, x2x30"   Bridge, x15x4"  SKTC, x10x3"  Piriformis, x2x30"   PPT, x10x5"  BKFO c/ PPT GTB 3 sec x10  TA contraction + SLR c/ PB 2x10       NV: paloff press, 90/90 heel taps (suggestions from carina " "PT)    Peripheral muscle strengthening which included one set of 15-20 repetitions at a slow and controlled 10-13 second per rep pace focused on strengthening supporting musculature in order to improve body mechanics and functional mobility. Patient and therapist focused on proper form during treatment to ensure optimal strengthening of each targeted muscle group.  Machines utilized included:Torso rotation, Leg Ext, Leg Curl, Chest Press, and Rowing  To be added next visit:Triceps, Biceps, Hip Abd, and Leg Press      Caridad participated in dynamic functional therapeutic activities to improve functional performance and simulate household and community activities for 00  minutes. The following activities were included:      Caridad received manual therapy techniques for 00  minutes. The following activities were included:      Pt given HEAT pack for 5 minutes to low back in supine.    Patient Education and Home Exercises     Home exercises include:  LTR, hamstring stretch, bridge, SKTC, piriformis, PPT   Cardio program (V5): - 11/5/2024  Lifting education (V11): -  Posture/Lumbar roll: TBD  Fr[employer] Discharge handout (date given): -  Equipment at home/gym membership: no, stopped 3 months ago d/t pain       Education provided:   - Patient received education in benefits of progressing mobility and strengthening gradually  - Pt educated in proper body mechanics and lifting techniques.  - Discussed exertion scale, slow progressive resistance protocol to promote safe and healthy strengthening of supportive musculature  by performing 15-20 reps, 7 sec per rep, and increasing weight by 5 % at 20 reps only if there ex done safely, slowly, using correct musculature, exertion and no pain.  Patient expressed understanding.  -Pt educated on strategies to safely perform examination and exercise using "Stop Light" analogy to describe how to avoid or stop irritating motions, proceed with caution with some movements, and progress " positive exercises.     Written Home Exercises Provided: Patient instructed to cont prior HEP.  Exercises were reviewed and Caridad was able to demonstrate them prior to the end of the session.  Caridad demonstrated good  understanding of the education provided.     See EMR under Patient Instructions for exercises provided 10/17/2024.    Assessment     Caridad presents to healthy back visit with fair low back symptoms. She tolerated exercises well without exhibiting further exacerbation of symptoms. Neuro muscular re-ed of spine musculature cont with Lumbar MedX resistance at 30 ft/lbs pt complete 20 reps with PRE 4/10. All peripheral circuit machines performed without incidence. Progress per HB protocol and pt's tolerance.     Patient is making good progress towards established goals.  Pt will continue to benefit from skilled outpatient physical therapy to address the deficits stated in the impairment chart, provide pt/family education and to maximize pt's level of independence in the home and community environment.     Anticipated Barriers for therapy: chronicity of symptoms   Pt's spiritual, cultural and educational needs considered and pt agreeable to plan of care and goals as stated below:     GOALS: Pt is in agreement with the following goals.     Short term goals:  6 weeks or 10 visits   - Pt will demonstrate increased lumbar MedX ROM by at least 0-5 degrees from the initial ROM value with improvements noted in functional ROM and ability to perform ADLs. Appropriate and Ongoing  - Pt will demonstrate increased MedX average isometric strength value by 5-10% from initial test resulting in improved ability to perform bending, lifting, and carrying activities safely, confidently. Appropriate and Ongoing  - Pt will report a reduction in worst pain score by 1-2 points for improved tolerance for standing for 15 minutes or more. Appropriate and Ongoing  - Pt able to perform HEP correctly with minimal cueing or supervision  from therapist to encourage independent management of symptoms. Appropriate and Ongoing     Long term goals: 10 weeks or 20 visits   - Pt will demonstrate increased lumbar MedX ROM by at least 5-10 degrees from initial ROM value, resulting in improved ability to perform functional forward bending while standing and sitting. Appropriate and Ongoing  - Pt will demonstrate increased MedX average isometric strength value by 50-75% from initial test resulting in improved ability to perform bending, lifting, and carrying activities safely and confidently. Appropriate and Ongoing  - Pt to demonstrate ability to independently control and reduce their pain through posture positioning and mechanical movements throughout a typical day. Appropriate and Ongoing  - Pt will demonstrate reduced pain and improved functional outcomes as reported on the FOTO by reaching an intake score of >/= 62% functional ability in order to demonstrate subjective improvement in patient's condition. . Appropriate and Ongoing  - Pt will demonstrate independence with the HEP at discharge. Appropriate and Ongoing  - Patient will be able to complete forward bending while gardening with minimal to no complications indicating improved tolerance to activity. Appropriate and Ongoing       Plan     Continue with established Plan of Care towards established PT goals.     Therapist: Meena Hackett, PT  11/5/2024

## 2024-11-05 ENCOUNTER — CLINICAL SUPPORT (OUTPATIENT)
Dept: REHABILITATION | Facility: OTHER | Age: 79
End: 2024-11-05
Payer: MEDICARE

## 2024-11-05 DIAGNOSIS — M25.69 DECREASED RANGE OF MOTION OF TRUNK AND BACK: Primary | ICD-10-CM

## 2024-11-05 DIAGNOSIS — R29.898 DECREASED STRENGTH OF TRUNK AND BACK: ICD-10-CM

## 2024-11-05 PROCEDURE — 97112 NEUROMUSCULAR REEDUCATION: CPT

## 2024-11-05 PROCEDURE — 97110 THERAPEUTIC EXERCISES: CPT

## 2024-11-07 ENCOUNTER — CLINICAL SUPPORT (OUTPATIENT)
Dept: REHABILITATION | Facility: OTHER | Age: 79
End: 2024-11-07
Payer: MEDICARE

## 2024-11-07 DIAGNOSIS — R29.898 DECREASED STRENGTH OF TRUNK AND BACK: ICD-10-CM

## 2024-11-07 DIAGNOSIS — M25.69 DECREASED RANGE OF MOTION OF TRUNK AND BACK: Primary | ICD-10-CM

## 2024-11-07 PROCEDURE — 97110 THERAPEUTIC EXERCISES: CPT | Mod: CQ

## 2024-11-07 PROCEDURE — 97112 NEUROMUSCULAR REEDUCATION: CPT | Mod: CQ

## 2024-11-07 NOTE — PROGRESS NOTES
"[facility]  Physical Therapy Treatment Note     Name: Caridad Hinds  Clinic Number: [id number]    Therapy Diagnosis:   Encounter Diagnoses   Name Primary?    Decreased range of motion of trunk and back Yes    Decreased strength of trunk and back              Physician: Lorena Beatty NP    Visit Date: 11/7/2024    Physician Orders: PT Eval and Treat  Medical Diagnosis from Referral: M51.36 (ICD-10-CM) - DDD (degenerative disc disease), lumbar; M48.062 (ICD-10-CM) - Spinal stenosis of lumbar region with neurogenic claudication; M54.16 (ICD-10-CM) - Lumbar radiculopathy  Evaluation Date: 10/15/2024  Authorization Period Expiration: 12/31/2024  Plan of Care Expiration: 12/24/2024  Reassessment Due: 11/15/2024  Visit # / Visits authorized: 5/20  MedX testing visit 1    PTA Visit #: 0/5     Time In: 11:00 a  Time Out: 12:00 pm  Total Billable Time: 55 minutes  INSURANCE and OUTCOMES: Fee for Service with FOTO Outcomes 1/3     Precautions: standard     Pattern of pain determined: PEN    Subjective     Caridad Hinds reports feeling fair with moderate pain rating. Pt reports back stiffness in the AM.    Patient reports tolerating previous visit fair  Patient reports their pain to be 4/10 on a 0-10 scale with 0 being no pain and 10 being the worst pain imaginable.  Pain Location: R > L low back, radiating symptoms into kye buttocks      Occupation: retired  Leisure: gardening, reading, treadmill 3x/week, gym      Pts goals: "be able to get back to the gym and aerobics"     Objective      Lumbar  Isometric Testing on Med X equipment: Testing administered by PT    Test Initial Baseline Midpoint Final   Date 10/17/2024     ROM 0-48 deg     Max Peak Torque 71      Min Peak Torque 5      Flex/Ext Ratio 14.2:1     % variance  normative data 52     % change from initial test N/A visit 1       Starting weight 30     OUTCOMES SELECTION:   Intake Outcome Measure for FOTO Lumbar Survey   " "  Therapist reviewed FOTO scores for Caridad Hinds on 10/15/2024.   FOTO documents entered into [employer] - see Media section.     Intake Score: 47% functional ability (SHASHANK: 20)   Goal Score: 62% functional ability (SHASHANK: 10)            Treatment     Caridad received the treatments listed below:      Medical MedX Treatment as follows:  MedX testing performed day 2: Patient  received neuromuscular education to engage spinal musculature correctly for motor control and engagement of musculature for 15 minutes including the MedX exercise component and practice and standard testing. MedX dynamic exercise and baseline isometric test performed with instructions to guide the patient safely through the testing procedure. Patient instructed to perform isometric test correctly and safely while building to an optimal force with a pain-free effort. Patient also instructed that they should feel support/pressure from MedX restraints but no pain/discomfort, and encouraged to report any pain to therapist. Patient demonstrated appropriate understanding of information and tolerance of test.  Education regarding purpose of test, safety during test given, and reviewed possible more soreness and strategies.           11/7/2024    11:38 AM   [facility]   Visit Number 6   VAS Pain Rating 3   Time 5   Flexion in Lying 10   Lumbar Weight 33 lbs   Repetitions 15   Rating of Perceived Exertion 3   Ice - Z Lie (in min.) 5       Caridad participated in neuromuscular re-education activities to improve balance, coordination, proprioception, motor control and/or posture for 00 minutes. The following activities were included:        Caridad participated in therapeutic exercises to develop strength, endurance, ROM, flexibility, posture, and core stabilization for 45 minutes including:    LTR, x10x5"   Hamstring stretch, x2x30" NP  Bridge, x15x4"  SKTC, x10x3"  Piriformis, x2x20"   PPT, x10x5"  BKFO c/ PPT GTB 3 sec x10  TA contraction + SLR c/ PB x15 " "  +Paloff press x10 GTB  +SOC x10 ( pain in B hip during the slouch)    NV:  90/90 heel taps (suggestions from carina PT)    Peripheral muscle strengthening which included one set of 15-20 repetitions at a slow and controlled 10-13 second per rep pace focused on strengthening supporting musculature in order to improve body mechanics and functional mobility. Patient and therapist focused on proper form during treatment to ensure optimal strengthening of each targeted muscle group.  Machines utilized included:Torso rotation, Leg Ext, Leg Curl, Chest Press, and Rowing  To be added next visit:Triceps, Biceps, Hip Abd, and Leg Press      Caridad participated in dynamic functional therapeutic activities to improve functional performance and simulate household and community activities for 00  minutes. The following activities were included:      Caridad received manual therapy techniques for 00  minutes. The following activities were included:      Pt given HEAT pack for 5 minutes to low back in supine.    Patient Education and Home Exercises     Home exercises include:  LTR, hamstring stretch, bridge, SKTC, piriformis, PPT   Cardio program (V5): - 11/7/2024  Lifting education (V11): -  Posture/Lumbar roll: TBD  [employer] Discharge handout (date given): -  Equipment at home/gym membership: no, stopped 3 months ago d/t pain       Education provided:   - Patient received education in benefits of progressing mobility and strengthening gradually  - Pt educated in proper body mechanics and lifting techniques.  - Discussed exertion scale, slow progressive resistance protocol to promote safe and healthy strengthening of supportive musculature  by performing 15-20 reps, 7 sec per rep, and increasing weight by 5 % at 20 reps only if there ex done safely, slowly, using correct musculature, exertion and no pain.  Patient expressed understanding.  -Pt educated on strategies to safely perform examination and exercise using "Stop Light" " analogy to describe how to avoid or stop irritating motions, proceed with caution with some movements, and progress positive exercises.     Written Home Exercises Provided: Patient instructed to cont prior HEP.  Exercises were reviewed and Caridad was able to demonstrate them prior to the end of the session.  Caridad demonstrated good  understanding of the education provided.     See EMR under Patient Instructions for exercises provided 10/17/2024.    Assessment     Caridad presents to healthy back visit with fair low back symptoms. She tolerated exercises well without exhibiting further exacerbation of symptoms.Added standing core stability ex c/ Paloff press, tolerated well.  Neuro muscular re-ed of spine musculature cont with Lumbar MedX resistance at 33 ft/lbs pt complete 15 reps with PRE 3/10. All peripheral circuit machines performed without incidence. Progress per HB protocol and pt's tolerance. Cardio handout issued and discussed.     Patient is making good progress towards established goals.  Pt will continue to benefit from skilled outpatient physical therapy to address the deficits stated in the impairment chart, provide pt/family education and to maximize pt's level of independence in the home and community environment.     Anticipated Barriers for therapy: chronicity of symptoms   Pt's spiritual, cultural and educational needs considered and pt agreeable to plan of care and goals as stated below:     GOALS: Pt is in agreement with the following goals.     Short term goals:  6 weeks or 10 visits   - Pt will demonstrate increased lumbar MedX ROM by at least 0-5 degrees from the initial ROM value with improvements noted in functional ROM and ability to perform ADLs. Appropriate and Ongoing  - Pt will demonstrate increased MedX average isometric strength value by 5-10% from initial test resulting in improved ability to perform bending, lifting, and carrying activities safely, confidently. Appropriate and  Ongoing  - Pt will report a reduction in worst pain score by 1-2 points for improved tolerance for standing for 15 minutes or more. Appropriate and Ongoing  - Pt able to perform HEP correctly with minimal cueing or supervision from therapist to encourage independent management of symptoms. Appropriate and Ongoing     Long term goals: 10 weeks or 20 visits   - Pt will demonstrate increased lumbar MedX ROM by at least 5-10 degrees from initial ROM value, resulting in improved ability to perform functional forward bending while standing and sitting. Appropriate and Ongoing  - Pt will demonstrate increased MedX average isometric strength value by 50-75% from initial test resulting in improved ability to perform bending, lifting, and carrying activities safely and confidently. Appropriate and Ongoing  - Pt to demonstrate ability to independently control and reduce their pain through posture positioning and mechanical movements throughout a typical day. Appropriate and Ongoing  - Pt will demonstrate reduced pain and improved functional outcomes as reported on the FOTO by reaching an intake score of >/= 62% functional ability in order to demonstrate subjective improvement in patient's condition. . Appropriate and Ongoing  - Pt will demonstrate independence with the HEP at discharge. Appropriate and Ongoing  - Patient will be able to complete forward bending while gardening with minimal to no complications indicating improved tolerance to activity. Appropriate and Ongoing       Plan     Continue with established Plan of Care towards established PT goals.     Therapist: Ana Olivares, PTA  11/7/2024

## 2024-11-12 ENCOUNTER — CLINICAL SUPPORT (OUTPATIENT)
Dept: REHABILITATION | Facility: OTHER | Age: 79
End: 2024-11-12
Payer: MEDICARE

## 2024-11-12 DIAGNOSIS — R29.898 DECREASED STRENGTH OF TRUNK AND BACK: ICD-10-CM

## 2024-11-12 DIAGNOSIS — M25.69 DECREASED RANGE OF MOTION OF TRUNK AND BACK: Primary | ICD-10-CM

## 2024-11-12 PROCEDURE — 97110 THERAPEUTIC EXERCISES: CPT | Mod: CQ

## 2024-11-12 PROCEDURE — 97112 NEUROMUSCULAR REEDUCATION: CPT | Mod: CQ

## 2024-11-12 NOTE — PROGRESS NOTES
"[facility]  Physical Therapy Treatment Note     Name: Caridad Hinds  Clinic Number: [id number]    Therapy Diagnosis:   Encounter Diagnoses   Name Primary?    Decreased range of motion of trunk and back Yes    Decreased strength of trunk and back              Physician: Lorena Beatty NP    Visit Date: 11/12/2024    Physician Orders: PT Eval and Treat  Medical Diagnosis from Referral: M51.36 (ICD-10-CM) - DDD (degenerative disc disease), lumbar; M48.062 (ICD-10-CM) - Spinal stenosis of lumbar region with neurogenic claudication; M54.16 (ICD-10-CM) - Lumbar radiculopathy  Evaluation Date: 10/15/2024  Authorization Period Expiration: 12/31/2024  Plan of Care Expiration: 12/24/2024  Reassessment Due: 11/15/2024  Visit # / Visits authorized: 5/20  MedX testing visit 1    PTA Visit #: 2/5     Time In: 11:00 pm  Time Out: 12:00 pm  Total Billable Time: 55 minutes  INSURANCE and OUTCOMES: Fee for Service with FOTO Outcomes 1/3     Precautions: standard     Pattern of pain determined: PEN    Subjective     Caridad Hinds reports overall improvements with therapy.     Patient reports tolerating previous visit fair  Patient reports their pain to be 3/10 on a 0-10 scale with 0 being no pain and 10 being the worst pain imaginable.  Pain Location: R > L low back, radiating symptoms into kye buttocks      Occupation: retired  Leisure: gardening, reading, treadmill 3x/week, gym      Pts goals: "be able to get back to the gym and aerobics"     Objective      Lumbar  Isometric Testing on Med X equipment: Testing administered by PT    Test Initial Baseline Midpoint Final   Date 10/17/2024     ROM 0-48 deg     Max Peak Torque 71      Min Peak Torque 5      Flex/Ext Ratio 14.2:1     % variance  normative data 52     % change from initial test N/A visit 1       Starting weight 30     OUTCOMES SELECTION:   Intake Outcome Measure for FOTO Lumbar Survey     Therapist reviewed FOTO scores for Caridad SLATER" "Guzman on 10/15/2024.   FOTO documents entered into [employer] - see Media section.     Intake Score: 47% functional ability (SHASHANK: 20)   Goal Score: 62% functional ability (SHASHANK: 10)            Treatment     Caridad received the treatments listed below:      Medical MedX Treatment as follows:  MedX testing performed day 2: Patient  received neuromuscular education to engage spinal musculature correctly for motor control and engagement of musculature for 10 minutes including the MedX exercise component and practice and standard testing. MedX dynamic exercise and baseline isometric test performed with instructions to guide the patient safely through the testing procedure. Patient instructed to perform isometric test correctly and safely while building to an optimal force with a pain-free effort. Patient also instructed that they should feel support/pressure from MedX restraints but no pain/discomfort, and encouraged to report any pain to therapist. Patient demonstrated appropriate understanding of information and tolerance of test.  Education regarding purpose of test, safety during test given, and reviewed possible more soreness and strategies.                11/12/2024     4:01 PM   [facility]   Visit Number 7   VAS Pain Rating 3   Time 5   Flexion in Lying 10   Lumbar Weight 33 lbs   Repetitions 18   Rating of Perceived Exertion 3   Ice - Z Lie (in min.) 5          Caridad participated in neuromuscular re-education activities to improve balance, coordination, proprioception, motor control and/or posture for 00 minutes. The following activities were included:        Caridad participated in therapeutic exercises to develop strength, endurance, ROM, flexibility, posture, and core stabilization for 45 minutes including:    LTR, x10x5"   Hamstring stretch, x2x30"   Bridge, x15x4"  SKTC, x10"x3  Piriformis, x2x20"   PPT, x10x5"-np  BKFO c/ PPT GTB 3 sec x10  TA contraction + SLR c/ PB x15   +Paloff press x10 GTB  +SOC x10 ( " "pain in B hip during the slouch)    NV:  90/90 heel taps (suggestions from carina PT)    Peripheral muscle strengthening which included one set of 15-20 repetitions at a slow and controlled 10-13 second per rep pace focused on strengthening supporting musculature in order to improve body mechanics and functional mobility. Patient and therapist focused on proper form during treatment to ensure optimal strengthening of each targeted muscle group.  Machines utilized included:Torso rotation, Leg Ext, Leg Curl, Chest Press, and Rowing  Triceps, Biceps, Hip Abd, and Leg Press      Caridad participated in dynamic functional therapeutic activities to improve functional performance and simulate household and community activities for 00  minutes. The following activities were included:      Caridad received manual therapy techniques for 00  minutes. The following activities were included:      Pt given HEAT pack for 5 minutes to low back in supine.    Patient Education and Home Exercises     Home exercises include:  LTR, hamstring stretch, bridge, SKTC, piriformis, PPT   Cardio program (V5): - 11/12/2024  Lifting education (V11): -  Posture/Lumbar roll: TBD  Fridge Magnet Discharge handout (date given): -  Equipment at home/gym membership: no, stopped 3 months ago d/t pain       Education provided:   - Patient received education in benefits of progressing mobility and strengthening gradually  - Pt educated in proper body mechanics and lifting techniques.  - Discussed exertion scale, slow progressive resistance protocol to promote safe and healthy strengthening of supportive musculature  by performing 15-20 reps, 7 sec per rep, and increasing weight by 5 % at 20 reps only if there ex done safely, slowly, using correct musculature, exertion and no pain.  Patient expressed understanding.  -Pt educated on strategies to safely perform examination and exercise using "Stop Light" analogy to describe how to avoid or stop irritating " motions, proceed with caution with some movements, and progress positive exercises.     Written Home Exercises Provided: Patient instructed to cont prior HEP.  Exercises were reviewed and Caridad was able to demonstrate them prior to the end of the session.  Caridad demonstrated good  understanding of the education provided.     See EMR under Patient Instructions for exercises provided 10/17/2024.    Assessment     Caridad presents to healthy back visit with min low back symptoms. She tolerated exercises well without exhibiting further exacerbation of symptoms.  Neuro muscular re-ed of spine musculature cont with Lumbar MedX resistance at 33 ft/lbs pt complete 18 reps with PRE 3/10. All peripheral circuit machines performed without incidence. Progress per HB protocol and pt's tolerance.    Patient is making good progress towards established goals.  Pt will continue to benefit from skilled outpatient physical therapy to address the deficits stated in the impairment chart, provide pt/family education and to maximize pt's level of independence in the home and community environment.     Anticipated Barriers for therapy: chronicity of symptoms   Pt's spiritual, cultural and educational needs considered and pt agreeable to plan of care and goals as stated below:     GOALS: Pt is in agreement with the following goals.     Short term goals:  6 weeks or 10 visits   - Pt will demonstrate increased lumbar MedX ROM by at least 0-5 degrees from the initial ROM value with improvements noted in functional ROM and ability to perform ADLs. Appropriate and Ongoing  - Pt will demonstrate increased MedX average isometric strength value by 5-10% from initial test resulting in improved ability to perform bending, lifting, and carrying activities safely, confidently. Appropriate and Ongoing  - Pt will report a reduction in worst pain score by 1-2 points for improved tolerance for standing for 15 minutes or more. Appropriate and Ongoing  - Pt  able to perform HEP correctly with minimal cueing or supervision from therapist to encourage independent management of symptoms. Appropriate and Ongoing     Long term goals: 10 weeks or 20 visits   - Pt will demonstrate increased lumbar MedX ROM by at least 5-10 degrees from initial ROM value, resulting in improved ability to perform functional forward bending while standing and sitting. Appropriate and Ongoing  - Pt will demonstrate increased MedX average isometric strength value by 50-75% from initial test resulting in improved ability to perform bending, lifting, and carrying activities safely and confidently. Appropriate and Ongoing  - Pt to demonstrate ability to independently control and reduce their pain through posture positioning and mechanical movements throughout a typical day. Appropriate and Ongoing  - Pt will demonstrate reduced pain and improved functional outcomes as reported on the FOTO by reaching an intake score of >/= 62% functional ability in order to demonstrate subjective improvement in patient's condition. . Appropriate and Ongoing  - Pt will demonstrate independence with the HEP at discharge. Appropriate and Ongoing  - Patient will be able to complete forward bending while gardening with minimal to no complications indicating improved tolerance to activity. Appropriate and Ongoing       Plan     Continue with established Plan of Care towards established PT goals.     Therapist: Anna Smith, PTA  11/12/2024

## 2024-12-03 ENCOUNTER — CLINICAL SUPPORT (OUTPATIENT)
Dept: REHABILITATION | Facility: OTHER | Age: 79
End: 2024-12-03
Payer: MEDICARE

## 2024-12-03 DIAGNOSIS — M25.69 DECREASED RANGE OF MOTION OF TRUNK AND BACK: Primary | ICD-10-CM

## 2024-12-03 DIAGNOSIS — R29.898 DECREASED STRENGTH OF TRUNK AND BACK: ICD-10-CM

## 2024-12-03 PROCEDURE — 97112 NEUROMUSCULAR REEDUCATION: CPT

## 2024-12-03 PROCEDURE — 97110 THERAPEUTIC EXERCISES: CPT

## 2024-12-03 NOTE — PLAN OF CARE
"[facility]  Physical Therapy Treatment Note     Name: Caridad Hinds  Clinic Number: [id number]    Therapy Diagnosis:   Encounter Diagnoses   Name Primary?    Decreased range of motion of trunk and back Yes    Decreased strength of trunk and back              Physician: Lorena Beatty NP    Visit Date: 12/3/2024    Physician Orders: PT Eval and Treat  Medical Diagnosis from Referral: M51.36 (ICD-10-CM) - DDD (degenerative disc disease), lumbar; M48.062 (ICD-10-CM) - Spinal stenosis of lumbar region with neurogenic claudication; M54.16 (ICD-10-CM) - Lumbar radiculopathy  Evaluation Date: 10/15/2024  Authorization Period Expiration: 12/31/2024  Plan of Care Expiration: 12/24/2024  Reassessment Due: 12/14/2024  Visit # / Visits authorized: 8/20  MedX testing visit 1    PTA Visit #: 0/5     Time In: 11:00 pm  Time Out: 12:00 pm  Total Billable Time: 55 minutes  INSURANCE and OUTCOMES: Fee for Service with FOTO Outcomes 1/3     Precautions: standard     Pattern of pain determined: PEN    Subjective     Caridad Hinds reports low back stiffness but no pain currently. She occasionally has low back tightness but feels better after she performs her HEP everyday. She notices how her HEP is helping her significantly even to the point where she no longer has to take pain medication.     Patient reports tolerating previous visit fair  Patient reports their pain to be 0/10 on a 0-10 scale with 0 being no pain and 10 being the worst pain imaginable.  Pain Location: R > L low back, radiating symptoms into kye buttocks      Occupation: retired  Leisure: gardening, reading, treadmill 3x/week, gym      Pts goals: "be able to get back to the gym and aerobics"     Objective      Lumbar  Isometric Testing on Med X equipment: Testing administered by PT    Test Initial Baseline Midpoint Final   Date 10/17/2024     ROM 0-48 deg     Max Peak Torque 71      Min Peak Torque 5      Flex/Ext Ratio 14.2:1   " "  % variance  normative data 52     % change from initial test N/A visit 1       Starting weight 30     OUTCOMES SELECTION:   Intake Outcome Measure for FOTO Lumbar Survey     Therapist reviewed FOTO scores for Caridad Hinds on 10/15/2024.   FOTO documents entered into [employer] - see Media section.     Intake Score: 47% functional ability (SHASHANK: 20)   Goal Score: 62% functional ability (SHASHANK: 10)              Patient Education and Home Exercises     Home exercises include:  LTR, hamstring stretch, bridge, SKTC, piriformis, PPT   Cardio program (V5): - 12/3/2024  Lifting education (V11): -  Posture/Lumbar roll: TBD  Fridge Magnet Discharge handout (date given): -  Equipment at home/gym membership: no, stopped 3 months ago d/t pain       Education provided:   - Patient received education in benefits of progressing mobility and strengthening gradually  - Pt educated in proper body mechanics and lifting techniques.  - Discussed exertion scale, slow progressive resistance protocol to promote safe and healthy strengthening of supportive musculature  by performing 15-20 reps, 7 sec per rep, and increasing weight by 5 % at 20 reps only if there ex done safely, slowly, using correct musculature, exertion and no pain.  Patient expressed understanding.  -Pt educated on strategies to safely perform examination and exercise using "Stop Light" analogy to describe how to avoid or stop irritating motions, proceed with caution with some movements, and progress positive exercises.     Written Home Exercises Provided: Patient instructed to cont prior HEP.  Exercises were reviewed and Caridad was able to demonstrate them prior to the end of the session.  Caridad demonstrated good  understanding of the education provided.     See EMR under Patient Instructions for exercises provided 10/17/2024.    Assessment     Caridad presents to healthy back visit with reports of low back stiffness. She tolerated exercises well without exhibiting further " exacerbation of symptoms.  Neuro muscular re-ed of spine musculature cont with Lumbar MedX resistance at 33 ft/lbs pt complete 20 reps with PRE 3/10. All peripheral circuit machines performed without incidence. Progress per HB protocol and pt's tolerance.    Patient is making good progress towards established goals.  Pt will continue to benefit from skilled outpatient physical therapy to address the deficits stated in the impairment chart, provide pt/family education and to maximize pt's level of independence in the home and community environment.     Anticipated Barriers for therapy: chronicity of symptoms   Pt's spiritual, cultural and educational needs considered and pt agreeable to plan of care and goals as stated below:     GOALS: Pt is in agreement with the following goals.     Short term goals:  6 weeks or 10 visits   - Pt will demonstrate increased lumbar MedX ROM by at least 0-5 degrees from the initial ROM value with improvements noted in functional ROM and ability to perform ADLs. Appropriate and Ongoing  - Pt will demonstrate increased MedX average isometric strength value by 5-10% from initial test resulting in improved ability to perform bending, lifting, and carrying activities safely, confidently. Appropriate and Ongoing  - Pt will report a reduction in worst pain score by 1-2 points for improved tolerance for standing for 15 minutes or more. Appropriate and Ongoing  - Pt able to perform HEP correctly with minimal cueing or supervision from therapist to encourage independent management of symptoms. Appropriate and Ongoing     Long term goals: 10 weeks or 20 visits   - Pt will demonstrate increased lumbar MedX ROM by at least 5-10 degrees from initial ROM value, resulting in improved ability to perform functional forward bending while standing and sitting. Appropriate and Ongoing  - Pt will demonstrate increased MedX average isometric strength value by 50-75% from initial test resulting in improved  ability to perform bending, lifting, and carrying activities safely and confidently. Appropriate and Ongoing  - Pt to demonstrate ability to independently control and reduce their pain through posture positioning and mechanical movements throughout a typical day. Appropriate and Ongoing  - Pt will demonstrate reduced pain and improved functional outcomes as reported on the FOTO by reaching an intake score of >/= 62% functional ability in order to demonstrate subjective improvement in patient's condition. . Appropriate and Ongoing  - Pt will demonstrate independence with the HEP at discharge. Appropriate and Ongoing  - Patient will be able to complete forward bending while gardening with minimal to no complications indicating improved tolerance to activity. Appropriate and Ongoing       Plan     Continue with established Plan of Care towards established PT goals.     Therapist: Meena Hackett, PT  12/3/2024

## 2024-12-03 NOTE — PROGRESS NOTES
"[facility]  Physical Therapy Treatment Note     Name: Caridad Hinds  Clinic Number: [id number]    Therapy Diagnosis:   Encounter Diagnoses   Name Primary?    Decreased range of motion of trunk and back Yes    Decreased strength of trunk and back              Physician: Lorena Beatty NP    Visit Date: 12/3/2024    Physician Orders: PT Eval and Treat  Medical Diagnosis from Referral: M51.36 (ICD-10-CM) - DDD (degenerative disc disease), lumbar; M48.062 (ICD-10-CM) - Spinal stenosis of lumbar region with neurogenic claudication; M54.16 (ICD-10-CM) - Lumbar radiculopathy  Evaluation Date: 10/15/2024  Authorization Period Expiration: 12/31/2024  Plan of Care Expiration: 12/24/2024  Reassessment Due: 12/14/2024  Visit # / Visits authorized: 8/20  MedX testing visit 1    PTA Visit #: 0/5     Time In: 11:00 pm  Time Out: 12:00 pm  Total Billable Time: 55 minutes  INSURANCE and OUTCOMES: Fee for Service with FOTO Outcomes 1/3     Precautions: standard     Pattern of pain determined: PEN    Subjective     Caridad Hinds reports low back stiffness but no pain currently. She occasionally has low back tightness but feels better after she performs her HEP everyday. She notices how her HEP is helping her significantly even to the point where she no longer has to take pain medication.     Patient reports tolerating previous visit fair  Patient reports their pain to be 0/10 on a 0-10 scale with 0 being no pain and 10 being the worst pain imaginable.  Pain Location: R > L low back, radiating symptoms into kye buttocks      Occupation: retired  Leisure: gardening, reading, treadmill 3x/week, gym      Pts goals: "be able to get back to the gym and aerobics"     Objective      Lumbar  Isometric Testing on Med X equipment: Testing administered by PT    Test Initial Baseline Midpoint Final   Date 10/17/2024     ROM 0-48 deg     Max Peak Torque 71      Min Peak Torque 5      Flex/Ext Ratio 14.2:1   "   % variance  normative data 52     % change from initial test N/A visit 1       Starting weight 30     OUTCOMES SELECTION:   Intake Outcome Measure for FOTO Lumbar Survey     Therapist reviewed FOTO scores for Caridad Hinds on 10/15/2024.   FOTO documents entered into [employer] - see Media section.     Intake Score: 47% functional ability (SHASHANK: 20)   Goal Score: 62% functional ability (SHASHANK: 10)            Treatment     Caridad received the treatments listed below:      Medical MedX Treatment as follows:  MedX testing performed day 2: Patient  received neuromuscular education to engage spinal musculature correctly for motor control and engagement of musculature for 10 minutes including the MedX exercise component and practice and standard testing. MedX dynamic exercise and baseline isometric test performed with instructions to guide the patient safely through the testing procedure. Patient instructed to perform isometric test correctly and safely while building to an optimal force with a pain-free effort. Patient also instructed that they should feel support/pressure from MedX restraints but no pain/discomfort, and encouraged to report any pain to therapist. Patient demonstrated appropriate understanding of information and tolerance of test.  Education regarding purpose of test, safety during test given, and reviewed possible more soreness and strategies.              12/3/2024    11:18 AM   [facility]   Visit Number 8   VAS Pain Rating 0   Time 5   Flexion in Lying 10   Lumbar Weight 33 lbs   Repetitions 20   Rating of Perceived Exertion 3   Ice - Z Lie (in min.) 5         Caridad participated in neuromuscular re-education activities to improve balance, coordination, proprioception, motor control and/or posture for 00 minutes. The following activities were included:        Caridad participated in therapeutic exercises to develop strength, endurance, ROM, flexibility, posture, and core stabilization for 45 minutes  "including:    LTR, x10x5"   Hamstring stretch, x2x30"   Bridge, x15x3"  SKTC, x10"x3  Piriformis, x2x20"   PPT, x10x5"-np  BKFO c/ PPT GTB 3 sec x10  TA contraction + SLR c/ PB x15   Paloff press x10 GTB  SOC x10 ( pain in B hip during the slouch)    NV:  90/90 heel taps (suggestions from eval PT)    Peripheral muscle strengthening which included one set of 15-20 repetitions at a slow and controlled 10-13 second per rep pace focused on strengthening supporting musculature in order to improve body mechanics and functional mobility. Patient and therapist focused on proper form during treatment to ensure optimal strengthening of each targeted muscle group.  Machines utilized included:Torso rotation, Leg Ext, Leg Curl, Chest Press, and Rowing  Triceps, Biceps, Hip Abd, and Leg Press      Caridad participated in dynamic functional therapeutic activities to improve functional performance and simulate household and community activities for 00  minutes. The following activities were included:      Caridad received manual therapy techniques for 00  minutes. The following activities were included:      Pt given HEAT pack for 5 minutes to low back in supine.    Patient Education and Home Exercises     Home exercises include:  LTR, hamstring stretch, bridge, SKTC, piriformis, PPT   Cardio program (V5): - 12/3/2024  Lifting education (V11): -  Posture/Lumbar roll: TBD  Fridge Magnet Discharge handout (date given): -  Equipment at home/gym membership: no, stopped 3 months ago d/t pain       Education provided:   - Patient received education in benefits of progressing mobility and strengthening gradually  - Pt educated in proper body mechanics and lifting techniques.  - Discussed exertion scale, slow progressive resistance protocol to promote safe and healthy strengthening of supportive musculature  by performing 15-20 reps, 7 sec per rep, and increasing weight by 5 % at 20 reps only if there ex done safely, slowly, using correct " "musculature, exertion and no pain.  Patient expressed understanding.  -Pt educated on strategies to safely perform examination and exercise using "Stop Light" analogy to describe how to avoid or stop irritating motions, proceed with caution with some movements, and progress positive exercises.     Written Home Exercises Provided: Patient instructed to cont prior HEP.  Exercises were reviewed and Caridad was able to demonstrate them prior to the end of the session.  Caridad demonstrated good  understanding of the education provided.     See EMR under Patient Instructions for exercises provided 10/17/2024.    Assessment     Caridad presents to [facility] with reports of low back stiffness. She tolerated exercises well without exhibiting further exacerbation of symptoms.  Neuro muscular re-ed of spine musculature cont with Lumbar MedX resistance at 33 ft/lbs pt complete 20 reps with PRE 3/10. All peripheral circuit machines performed without incidence. Progress per HB protocol and pt's tolerance.    Patient is making good progress towards established goals.  Pt will continue to benefit from skilled outpatient physical therapy to address the deficits stated in the impairment chart, provide pt/family education and to maximize pt's level of independence in the home and community environment.     Anticipated Barriers for therapy: chronicity of symptoms   Pt's spiritual, cultural and educational needs considered and pt agreeable to plan of care and goals as stated below:     GOALS: Pt is in agreement with the following goals.     Short term goals:  6 weeks or 10 visits   - Pt will demonstrate increased lumbar MedX ROM by at least 0-5 degrees from the initial ROM value with improvements noted in functional ROM and ability to perform ADLs. Appropriate and Ongoing  - Pt will demonstrate increased MedX average isometric strength value by 5-10% from initial test resulting in improved ability to perform bending, lifting, and " carrying activities safely, confidently. Appropriate and Ongoing  - Pt will report a reduction in worst pain score by 1-2 points for improved tolerance for standing for 15 minutes or more. Appropriate and Ongoing  - Pt able to perform HEP correctly with minimal cueing or supervision from therapist to encourage independent management of symptoms. Appropriate and Ongoing     Long term goals: 10 weeks or 20 visits   - Pt will demonstrate increased lumbar MedX ROM by at least 5-10 degrees from initial ROM value, resulting in improved ability to perform functional forward bending while standing and sitting. Appropriate and Ongoing  - Pt will demonstrate increased MedX average isometric strength value by 50-75% from initial test resulting in improved ability to perform bending, lifting, and carrying activities safely and confidently. Appropriate and Ongoing  - Pt to demonstrate ability to independently control and reduce their pain through posture positioning and mechanical movements throughout a typical day. Appropriate and Ongoing  - Pt will demonstrate reduced pain and improved functional outcomes as reported on the FOTO by reaching an intake score of >/= 62% functional ability in order to demonstrate subjective improvement in patient's condition. . Appropriate and Ongoing  - Pt will demonstrate independence with the HEP at discharge. Appropriate and Ongoing  - Patient will be able to complete forward bending while gardening with minimal to no complications indicating improved tolerance to activity. Appropriate and Ongoing       Plan     Continue with established Plan of Care towards established PT goals.     Therapist: Meena Hackett, PT  12/3/2024

## 2024-12-10 ENCOUNTER — CLINICAL SUPPORT (OUTPATIENT)
Dept: REHABILITATION | Facility: OTHER | Age: 79
End: 2024-12-10
Payer: MEDICARE

## 2024-12-10 DIAGNOSIS — R29.898 DECREASED STRENGTH OF TRUNK AND BACK: ICD-10-CM

## 2024-12-10 DIAGNOSIS — M25.69 DECREASED RANGE OF MOTION OF TRUNK AND BACK: Primary | ICD-10-CM

## 2024-12-10 PROCEDURE — 97112 NEUROMUSCULAR REEDUCATION: CPT

## 2024-12-10 PROCEDURE — 97110 THERAPEUTIC EXERCISES: CPT

## 2024-12-10 NOTE — PROGRESS NOTES
"[facility]  Physical Therapy Treatment Note     Name: Caridad Hinds  Clinic Number: [id number]    Therapy Diagnosis:   Encounter Diagnoses   Name Primary?    Decreased range of motion of trunk and back Yes    Decreased strength of trunk and back        Physician: Lorena Beatty NP    Visit Date: 12/10/2024    Physician Orders: PT Eval and Treat  Medical Diagnosis from Referral: M51.36 (ICD-10-CM) - DDD (degenerative disc disease), lumbar; M48.062 (ICD-10-CM) - Spinal stenosis of lumbar region with neurogenic claudication; M54.16 (ICD-10-CM) - Lumbar radiculopathy  Evaluation Date: 10/15/2024  Authorization Period Expiration: 12/31/2024  Plan of Care Expiration: 12/24/2024  Reassessment Due: 1/10/2024  Visit # / Visits authorized: 9/20  MedX testing visit 1    PTA Visit #: 0/5     Time In: 10:45 pm  Time Out: 11:50 pm  Total Billable Time: 60 minutes  INSURANCE and OUTCOMES: Fee for Service with FOTO Outcomes 1/3     Precautions: standard     Pattern of pain determined: PEN    Subjective     Caridad Hinds reports there was some knee pain below the knee cap yesterday that got better, she also had some L lateral thigh pain and R shoulder pain but notices when she does the stretches it gets better and she doesn't need to take the medication as much    Patient reports tolerating previous visit fair  Patient reports their pain to be 4/10 on a 0-10 scale with 0 being no pain and 10 being the worst pain imaginable.  Pain Location: R > L low back, radiating symptoms into kye buttocks      Occupation: retired  Leisure: gardening, reading, treadmill 3x/week, gym      Pts goals: "be able to get back to the gym and aerobics"     Objective      Lumbar  Isometric Testing on Med X equipment: Testing administered by PT    Test Initial Baseline Midpoint Final   Date 10/17/2024 12/10/2024    ROM 0-48 deg 0-51    Max Peak Torque 71  59    Min Peak Torque 5  15    Flex/Ext Ratio 14.2:1 3.9:1    % " variance  normative data 52 59    % change from initial test N/A visit 1 +22      Starting weight 30     OUTCOMES SELECTION:   Intake Outcome Measure for FOTO Lumbar Survey     Therapist reviewed FOTO scores for Caridad Hinds on 10/15/2024.   FOTO documents entered into [employer] - see Media section.     Intake Score: 47% functional ability (SHASHANK: 20)   Goal Score: 62% functional ability (SHASHANK: 10)            Treatment     Caridad received the treatments listed below:      Medical MedX Treatment as follows:  MedX testing performed day 2: Patient  received neuromuscular education to engage spinal musculature correctly for motor control and engagement of musculature for 10 minutes including the MedX exercise component and practice and standard testing. MedX dynamic exercise and baseline isometric test performed with instructions to guide the patient safely through the testing procedure. Patient instructed to perform isometric test correctly and safely while building to an optimal force with a pain-free effort. Patient also instructed that they should feel support/pressure from MedX restraints but no pain/discomfort, and encouraged to report any pain to therapist. Patient demonstrated appropriate understanding of information and tolerance of test.  Education regarding purpose of test, safety during test given, and reviewed possible more soreness and strategies.              12/10/2024    11:23 AM   [facility]   Visit Number 9   VAS Pain Rating 4   Time 5   Lumbar Flexion 51   Lumbar Extension 0   Lumbar Peak Torque 59 ft. lbs.   Min Torque 15   Test Percent Below Normative Data 59 %   Test Percent Gain in Strength from Initial  22 %           Caridad participated in neuromuscular re-education activities to improve balance, coordination, proprioception, motor control and/or posture for 00 minutes. The following activities were included:        Caridad participated in therapeutic exercises to develop strength, endurance,  "ROM, flexibility, posture, and core stabilization for 45 minutes including:    LTR, x10x5"   Hamstring stretch, x2x30"   Bridge, x15x3"  SKTC, x10"x3  Piriformis, x2x20"   PPT, x10x5"-np  PPT to 90/90 holds 3x15"  90/90 heel taps 2x10  BKFO c/ PPT GTB 3 sec x10  TA contraction + SLR c/ PB x15   Paloff press x10 GTB  SOC x10 ( pain in B hip during the slouch)        Peripheral muscle strengthening which included one set of 15-20 repetitions at a slow and controlled 10-13 second per rep pace focused on strengthening supporting musculature in order to improve body mechanics and functional mobility. Patient and therapist focused on proper form during treatment to ensure optimal strengthening of each targeted muscle group.  Machines utilized included:Torso rotation, Leg Ext, Leg Curl, Chest Press, and Rowing  Triceps, Biceps, Hip Abd, and Leg Press      Caridad participated in dynamic functional therapeutic activities to improve functional performance and simulate household and community activities for 00  minutes. The following activities were included:      Caridad received manual therapy techniques for 00  minutes. The following activities were included:      Pt given HEAT pack for 5 minutes to low back in sitting.    Patient Education and Home Exercises     Home exercises include:  LTR, hamstring stretch, bridge, SKTC, piriformis, PPT   Cardio program (V5): - 12/10/2024  Lifting education (V11): -  Posture/Lumbar roll: TBD  Fridge Magnet Discharge handout (date given): -  Equipment at home/gym membership: no, stopped 3 months ago d/t pain       Education provided:   - Patient received education in benefits of progressing mobility and strengthening gradually  - Pt educated in proper body mechanics and lifting techniques.  - Discussed exertion scale, slow progressive resistance protocol to promote safe and healthy strengthening of supportive musculature  by performing 15-20 reps, 7 sec per rep, and increasing weight by 5 % " "at 20 reps only if there ex done safely, slowly, using correct musculature, exertion and no pain.  Patient expressed understanding.  -Pt educated on strategies to safely perform examination and exercise using "Stop Light" analogy to describe how to avoid or stop irritating motions, proceed with caution with some movements, and progress positive exercises.     Written Home Exercises Provided: Patient instructed to cont prior HEP.  Exercises were reviewed and Caridad was able to demonstrate them prior to the end of the session.  Caridad demonstrated good  understanding of the education provided.     See EMR under Patient Instructions for exercises provided 10/17/2024.    Assessment     Patient has attended 10 visits at [facility] which included MD evaluation, PT evaluation with isometric testing, and physical therapy treatment including HEP instruction, education, aerobic work, dynamic strengthening on med ex equipment for the spine, and whole body strengthening on med ex equipment with increasing weight loads.  Patient  is demonstrating increased ability to reduce symptoms, improved posture, increased  ROM, and increased strength on med ex test by 22%  average.    Patient is making good progress towards established goals.  Pt will continue to benefit from skilled outpatient physical therapy to address the deficits stated in the impairment chart, provide pt/family education and to maximize pt's level of independence in the home and community environment.     Anticipated Barriers for therapy: chronicity of symptoms   Pt's spiritual, cultural and educational needs considered and pt agreeable to plan of care and goals as stated below:     GOALS: Pt is in agreement with the following goals.     Short term goals:  6 weeks or 10 visits   - Pt will demonstrate increased lumbar MedX ROM by at least 0-5 degrees from the initial ROM value with improvements noted in functional ROM and ability to perform ADLs. Met " 12/10/2024  - Pt will demonstrate increased MedX average isometric strength value by 5-10% from initial test resulting in improved ability to perform bending, lifting, and carrying activities safely, confidently. Met 12/10/2024  - Pt will report a reduction in worst pain score by 1-2 points for improved tolerance for standing for 15 minutes or more. Partially Met  - Pt able to perform HEP correctly with minimal cueing or supervision from therapist to encourage independent management of symptoms. Met 12/10/2024     Long term goals: 10 weeks or 20 visits   - Pt will demonstrate increased lumbar MedX ROM by at least 5-10 degrees from initial ROM value, resulting in improved ability to perform functional forward bending while standing and sitting. Appropriate and Ongoing  - Pt will demonstrate increased MedX average isometric strength value by 50-75% from initial test resulting in improved ability to perform bending, lifting, and carrying activities safely and confidently. Appropriate and Ongoing  - Pt to demonstrate ability to independently control and reduce their pain through posture positioning and mechanical movements throughout a typical day. Appropriate and Ongoing  - Pt will demonstrate reduced pain and improved functional outcomes as reported on the FOTO by reaching an intake score of >/= 62% functional ability in order to demonstrate subjective improvement in patient's condition. . Appropriate and Ongoing  - Pt will demonstrate independence with the HEP at discharge. Appropriate and Ongoing  - Patient will be able to complete forward bending while gardening with minimal to no complications indicating improved tolerance to activity. Appropriate and Ongoing       Plan     Continue with established Plan of Care towards established PT goals.     Therapist: Mk Jose, PT  12/10/2024

## 2024-12-12 ENCOUNTER — CLINICAL SUPPORT (OUTPATIENT)
Dept: REHABILITATION | Facility: OTHER | Age: 79
End: 2024-12-12
Payer: MEDICARE

## 2024-12-12 DIAGNOSIS — M25.69 DECREASED RANGE OF MOTION OF TRUNK AND BACK: Primary | ICD-10-CM

## 2024-12-12 DIAGNOSIS — R29.898 DECREASED STRENGTH OF TRUNK AND BACK: ICD-10-CM

## 2024-12-12 PROCEDURE — 97110 THERAPEUTIC EXERCISES: CPT | Mod: CQ

## 2024-12-12 PROCEDURE — 97112 NEUROMUSCULAR REEDUCATION: CPT | Mod: CQ

## 2024-12-12 NOTE — PROGRESS NOTES
"[facility]  Physical Therapy Treatment Note     Name: Caridad Hinds  Clinic Number: [id number]    Therapy Diagnosis:   Encounter Diagnoses   Name Primary?    Decreased range of motion of trunk and back Yes    Decreased strength of trunk and back          Physician: Lorena Beatty NP    Visit Date: 12/12/2024    Physician Orders: PT Eval and Treat  Medical Diagnosis from Referral: M51.36 (ICD-10-CM) - DDD (degenerative disc disease), lumbar; M48.062 (ICD-10-CM) - Spinal stenosis of lumbar region with neurogenic claudication; M54.16 (ICD-10-CM) - Lumbar radiculopathy  Evaluation Date: 10/15/2024  Authorization Period Expiration: 12/31/2024  Plan of Care Expiration: 12/24/2024  Reassessment Due: 1/10/2024  Visit # / Visits authorized: 10/20  (Re-test done visit 9)  MedX testing visit 1    PTA Visit #: 1/5     Time In: 11:00 pm  Time Out: 12:00 pm  Total Billable Time: 60 minutes  INSURANCE and OUTCOMES: Fee for Service with FOTO Outcomes 1/3     Precautions: standard     Pattern of pain determined: PEN    Subjective     Caridad Hinds reports after performing HEP, she always has decreased LBP and has been able to decrease her overall pain since starting HB.    Patient reports tolerating previous visit fair  Patient reports their pain to be 2/10 on a 0-10 scale with 0 being no pain and 10 being the worst pain imaginable.  Pain Location: R > L low back, radiating symptoms into kye buttocks      Occupation: retired  Leisure: gardening, reading, treadmill 3x/week, gym      Pts goals: "be able to get back to the gym and aerobics"     Objective      Lumbar  Isometric Testing on Med X equipment: Testing administered by PT    Test Initial Baseline Midpoint Final   Date 10/17/2024 12/10/2024    ROM 0-48 deg 0-51    Max Peak Torque 71  59    Min Peak Torque 5  15    Flex/Ext Ratio 14.2:1 3.9:1    % variance  normative data 52 59    % change from initial test N/A visit 1 +22      Starting " "weight 30     OUTCOMES SELECTION:   Intake Outcome Measure for FOTO Lumbar Survey     Therapist reviewed FOTO scores for Caridad Hinds on 10/15/2024.   FOTO documents entered into [employer] - see Media section.     Intake Score: 47% functional ability (SHASHANK: 20)   Goal Score: 62% functional ability (SHASHANK: 10)            Treatment     Caridad received the treatments listed below:      Medical MedX Treatment as follows:  MedX testing performed day 2: Patient  received neuromuscular education to engage spinal musculature correctly for motor control and engagement of musculature for 10 minutes including the MedX exercise component and practice and standard testing. MedX dynamic exercise and baseline isometric test performed with instructions to guide the patient safely through the testing procedure. Patient instructed to perform isometric test correctly and safely while building to an optimal force with a pain-free effort. Patient also instructed that they should feel support/pressure from MedX restraints but no pain/discomfort, and encouraged to report any pain to therapist. Patient demonstrated appropriate understanding of information and tolerance of test.  Education regarding purpose of test, safety during test given, and reviewed possible more soreness and strategies.              12/12/2024    11:46 AM   [facility]   Visit Number 10   VAS Pain Rating 2   Time 5   Flexion in Lying 10   Lumbar Weight 36 lbs   Repetitions 15   Rating of Perceived Exertion 3   Ice - Z Lie (in min.) 5       Caridad participated in neuromuscular re-education activities to improve balance, coordination, proprioception, motor control and/or posture for 00 minutes. The following activities were included:        Caridad participated in therapeutic exercises to develop strength, endurance, ROM, flexibility, posture, and core stabilization for 45 minutes including:    LTR, x10x5"   Hamstring stretch, x2x30"   Bridge, x15x3"  SKTC, " "x10"x3  Piriformis, x2x20"   PPT, x10x5"-np  PPT to 90/90 holds 3x15"  90/90 heel taps 2x10  BKFO c/ PPT GTB 3 sec x10  TA contraction + SLR c/ PB x15   Paloff press x10 GTB  SOC x10         Peripheral muscle strengthening which included one set of 15-20 repetitions at a slow and controlled 10-13 second per rep pace focused on strengthening supporting musculature in order to improve body mechanics and functional mobility. Patient and therapist focused on proper form during treatment to ensure optimal strengthening of each targeted muscle group.  Machines utilized included:Torso rotation, Leg Ext, Leg Curl, Chest Press, and Rowing  Triceps, Biceps, Hip Abd, and Leg Press      Caridad participated in dynamic functional therapeutic activities to improve functional performance and simulate household and community activities for 00  minutes. The following activities were included:      Caridad received manual therapy techniques for 00  minutes. The following activities were included:      Pt given HEAT pack for 5 minutes to low back in sitting.    Patient Education and Home Exercises     Home exercises include:  LTR, hamstring stretch, bridge, SKTC, piriformis, PPT   Cardio program (V5): - 12/12/2024  Lifting education (V11): -  Posture/Lumbar roll: TBD  [employer] Discharge handout (date given): -  Equipment at home/gym membership: no, stopped 3 months ago d/t pain       Education provided:   - Patient received education in benefits of progressing mobility and strengthening gradually  - Pt educated in proper body mechanics and lifting techniques.  - Discussed exertion scale, slow progressive resistance protocol to promote safe and healthy strengthening of supportive musculature  by performing 15-20 reps, 7 sec per rep, and increasing weight by 5 % at 20 reps only if there ex done safely, slowly, using correct musculature, exertion and no pain.  Patient expressed understanding.  -Pt educated on strategies to safely " "perform examination and exercise using "Stop Light" analogy to describe how to avoid or stop irritating motions, proceed with caution with some movements, and progress positive exercises.     Written Home Exercises Provided: Patient instructed to cont prior HEP.  Exercises were reviewed and Caridad was able to demonstrate them prior to the end of the session.  Caridad demonstrated good  understanding of the education provided.     See EMR under Patient Instructions for exercises provided 10/17/2024.    Assessment     Caridad returns to  pleased by her progress and min LB symptoms. Pt tolerates ex well and is progressing in core control. Lumbar MedX resistance increased this session and pt completed 15 reps with 3/10 PRE. Pt able to progress in peripheral resistance machines in resistance or reps without difficulty. Progress per  protocol and pt's tolerance.     Patient is making good progress towards established goals.  Pt will continue to benefit from skilled outpatient physical therapy to address the deficits stated in the impairment chart, provide pt/family education and to maximize pt's level of independence in the home and community environment.     Anticipated Barriers for therapy: chronicity of symptoms   Pt's spiritual, cultural and educational needs considered and pt agreeable to plan of care and goals as stated below:     GOALS: Pt is in agreement with the following goals.     Short term goals:  6 weeks or 10 visits   - Pt will demonstrate increased lumbar MedX ROM by at least 0-5 degrees from the initial ROM value with improvements noted in functional ROM and ability to perform ADLs. Met 12/10/2024  - Pt will demonstrate increased MedX average isometric strength value by 5-10% from initial test resulting in improved ability to perform bending, lifting, and carrying activities safely, confidently. Met 12/10/2024  - Pt will report a reduction in worst pain score by 1-2 points for improved tolerance for " standing for 15 minutes or more. Partially Met  - Pt able to perform HEP correctly with minimal cueing or supervision from therapist to encourage independent management of symptoms. Met 12/10/2024     Long term goals: 10 weeks or 20 visits   - Pt will demonstrate increased lumbar MedX ROM by at least 5-10 degrees from initial ROM value, resulting in improved ability to perform functional forward bending while standing and sitting. Appropriate and Ongoing  - Pt will demonstrate increased MedX average isometric strength value by 50-75% from initial test resulting in improved ability to perform bending, lifting, and carrying activities safely and confidently. Appropriate and Ongoing  - Pt to demonstrate ability to independently control and reduce their pain through posture positioning and mechanical movements throughout a typical day. Appropriate and Ongoing  - Pt will demonstrate reduced pain and improved functional outcomes as reported on the FOTO by reaching an intake score of >/= 62% functional ability in order to demonstrate subjective improvement in patient's condition. . Appropriate and Ongoing  - Pt will demonstrate independence with the HEP at discharge. Appropriate and Ongoing  - Patient will be able to complete forward bending while gardening with minimal to no complications indicating improved tolerance to activity. Appropriate and Ongoing       Plan     Continue with established Plan of Care towards established PT goals.     Therapist: Ana Olivares, PTA  12/12/2024

## 2024-12-17 ENCOUNTER — CLINICAL SUPPORT (OUTPATIENT)
Dept: REHABILITATION | Facility: OTHER | Age: 79
End: 2024-12-17
Payer: MEDICARE

## 2024-12-17 DIAGNOSIS — R29.898 DECREASED STRENGTH OF TRUNK AND BACK: ICD-10-CM

## 2024-12-17 DIAGNOSIS — M25.69 DECREASED RANGE OF MOTION OF TRUNK AND BACK: Primary | ICD-10-CM

## 2024-12-17 PROCEDURE — 97110 THERAPEUTIC EXERCISES: CPT | Mod: CQ

## 2024-12-17 PROCEDURE — 97530 THERAPEUTIC ACTIVITIES: CPT | Mod: CQ

## 2024-12-17 NOTE — PROGRESS NOTES
"[facility]  Physical Therapy Treatment Note     Name: Caridad Hinds  Clinic Number: [id number]    Therapy Diagnosis:   Encounter Diagnoses   Name Primary?    Decreased range of motion of trunk and back Yes    Decreased strength of trunk and back          Physician: Lorena Beatty NP    Visit Date: 12/17/2024    Physician Orders: PT Eval and Treat  Medical Diagnosis from Referral: M51.36 (ICD-10-CM) - DDD (degenerative disc disease), lumbar; M48.062 (ICD-10-CM) - Spinal stenosis of lumbar region with neurogenic claudication; M54.16 (ICD-10-CM) - Lumbar radiculopathy  Evaluation Date: 10/15/2024  Authorization Period Expiration: 12/31/2024  Plan of Care Expiration: 12/24/2024  Reassessment Due: 1/10/2024  Visit # / Visits authorized: 10/20  (Re-test done visit 9)  MedX testing visit 1    PTA Visit #: 2/5     Time In: 11:00 pm  Time Out: 12:00 pm  Total Billable Time: 60 minutes  INSURANCE and OUTCOMES: Fee for Service with FOTO Outcomes 1/3     Precautions: standard     Pattern of pain determined: PEN    Subjective     Caridad Hinds mainly c/o lumbar stiffness with centralized radicular symptoms. She reports pain in R knee and R shld into forearm today, which she plans to inform MD of issues.     Patient reports tolerating previous visit fair  Patient reports their pain to be 3.5/10 on a 0-10 scale with 0 being no pain and 10 being the worst pain imaginable.  Pain Location: R > L low back, radiating symptoms into kye buttocks      Occupation: retired  Leisure: gardening, reading, treadmill 3x/week, gym      Pts goals: "be able to get back to the gym and aerobics"     Objective      Lumbar  Isometric Testing on Med X equipment: Testing administered by PT    Test Initial Baseline Midpoint Final   Date 10/17/2024 12/10/2024    ROM 0-48 deg 0-51    Max Peak Torque 71  59    Min Peak Torque 5  15    Flex/Ext Ratio 14.2:1 3.9:1    % variance  normative data 52 59    % change from " "initial test N/A visit 1 +22      Starting weight 30     OUTCOMES SELECTION:   Intake Outcome Measure for FOTO Lumbar Survey     Therapist reviewed FOTO scores for Caridad Hinds on 10/15/2024.   FOTO documents entered into [employer] - see Media section.     Intake Score: 47% functional ability (SHASHANK: 20)   Goal Score: 62% functional ability (SHASHANK: 10)            Treatment     Caridad received the treatments listed below:      Medical MedX Treatment as follows:  MedX testing performed day 2: Patient  received neuromuscular education to engage spinal musculature correctly for motor control and engagement of musculature for 0 minutes including the MedX exercise component and practice and standard testing. MedX dynamic exercise and baseline isometric test performed with instructions to guide the patient safely through the testing procedure. Patient instructed to perform isometric test correctly and safely while building to an optimal force with a pain-free effort. Patient also instructed that they should feel support/pressure from MedX restraints but no pain/discomfort, and encouraged to report any pain to therapist. Patient demonstrated appropriate understanding of information and tolerance of test.  Education regarding purpose of test, safety during test given, and reviewed possible more soreness and strategies.                    Caridad participated in neuromuscular re-education activities to improve balance, coordination, proprioception, motor control and/or posture for 00 minutes. The following activities were included:        Caridad participated in therapeutic exercises to develop strength, endurance, ROM, flexibility, posture, and core stabilization for  30  minutes includin/17/2024    11:08 AM   [facility]   Visit Number 11   VAS Pain Rating 3.5   Time 5   Flexion in Lying 10   Lumbar Weight 36 lbs   Repetitions 18   Rating of Perceived Exertion 3   Ice - Z Lie (in min.) 5        LTR, x10x5" " "  Hamstring stretch, x2x30"   Bridge, x15x3"  SKTC, x10"x3  Piriformis, x2x20"   PPT, x10x5"-np  PPT to 90/90 holds 3x15"  90/90 heel taps 2x10  BKFO c/ PPT GTB 3 sec x10  TA contraction + SLR c/ PB x15   Paloff press x10 GTB  SOC x10         Peripheral muscle strengthening which included one set of 15-20 repetitions at a slow and controlled 10-13 second per rep pace focused on strengthening supporting musculature in order to improve body mechanics and functional mobility. Patient and therapist focused on proper form during treatment to ensure optimal strengthening of each targeted muscle group.  Machines utilized included:Torso rotation, Leg Ext, Leg Curl, Chest Press, and Rowing  Triceps, Biceps, Hip Abd, and Leg Press      Caridad participated in dynamic functional therapeutic activities to improve functional performance and simulate household and community activities for 25 minutes. The following activities were included:    Lifting mechanics and education  Proper lifting techniques  Neutral spine with hip hinge and knee with   20x    Sit to stand 10x      Lifting demonstration with crate and load transfer from floor to mat with 5x with lumbar muscular fatigue              Caridad received manual therapy techniques for 00  minutes. The following activities were included:      Pt given HEAT pack for 5 minutes to low back in sitting.    Patient Education and Home Exercises     Home exercises include:  LTR, hamstring stretch, bridge, SKTC, piriformis, PPT   Cardio program (V5): - 12/17/2024  Lifting education (V11): - 12/17/2024  Posture/Lumbar roll: TBD  [employer] Discharge handout (date given): -  Equipment at home/gym membership: no, stopped 3 months ago d/t pain       Education provided:   - Patient received education in benefits of progressing mobility and strengthening gradually  - Pt educated in proper body mechanics and lifting techniques.  - Discussed exertion scale, slow progressive resistance protocol to " "promote safe and healthy strengthening of supportive musculature  by performing 15-20 reps, 7 sec per rep, and increasing weight by 5 % at 20 reps only if there ex done safely, slowly, using correct musculature, exertion and no pain.  Patient expressed understanding.  -Pt educated on strategies to safely perform examination and exercise using "Stop Light" analogy to describe how to avoid or stop irritating motions, proceed with caution with some movements, and progress positive exercises.     Written Home Exercises Provided: Patient instructed to cont prior HEP.  Exercises were reviewed and Caridad was able to demonstrate them prior to the end of the session.  Caridad demonstrated good  understanding of the education provided.     See EMR under Patient Instructions for exercises provided 10/17/2024.    Assessment     Caridad returns to  pleased by her progress and min LB symptoms.She performed lift training within tolerance for safety and injury prevention during load tranfers, demonstrating guarded movement throughout training. Lumbar MedX resistance maintained at 36 lbs/ft with completion of 18 reps at 3/10 RPE. Full peripheral resistance machines completed without difficulty. Progress per  protocol and pt's tolerance.     Patient is making good progress towards established goals.  Pt will continue to benefit from skilled outpatient physical therapy to address the deficits stated in the impairment chart, provide pt/family education and to maximize pt's level of independence in the home and community environment.     Anticipated Barriers for therapy: chronicity of symptoms   Pt's spiritual, cultural and educational needs considered and pt agreeable to plan of care and goals as stated below:     GOALS: Pt is in agreement with the following goals.     Short term goals:  6 weeks or 10 visits   - Pt will demonstrate increased lumbar MedX ROM by at least 0-5 degrees from the initial ROM value with improvements noted in " functional ROM and ability to perform ADLs. Met 12/10/2024  - Pt will demonstrate increased MedX average isometric strength value by 5-10% from initial test resulting in improved ability to perform bending, lifting, and carrying activities safely, confidently. Met 12/10/2024  - Pt will report a reduction in worst pain score by 1-2 points for improved tolerance for standing for 15 minutes or more. Partially Met  - Pt able to perform HEP correctly with minimal cueing or supervision from therapist to encourage independent management of symptoms. Met 12/10/2024     Long term goals: 10 weeks or 20 visits   - Pt will demonstrate increased lumbar MedX ROM by at least 5-10 degrees from initial ROM value, resulting in improved ability to perform functional forward bending while standing and sitting. Appropriate and Ongoing  - Pt will demonstrate increased MedX average isometric strength value by 50-75% from initial test resulting in improved ability to perform bending, lifting, and carrying activities safely and confidently. Appropriate and Ongoing  - Pt to demonstrate ability to independently control and reduce their pain through posture positioning and mechanical movements throughout a typical day. Appropriate and Ongoing  - Pt will demonstrate reduced pain and improved functional outcomes as reported on the FOTO by reaching an intake score of >/= 62% functional ability in order to demonstrate subjective improvement in patient's condition. . Appropriate and Ongoing  - Pt will demonstrate independence with the HEP at discharge. Appropriate and Ongoing  - Patient will be able to complete forward bending while gardening with minimal to no complications indicating improved tolerance to activity. Appropriate and Ongoing       Plan     Continue with established Plan of Care towards established PT goals.     Therapist: Anna Smith, PAIGE  12/17/2024

## 2024-12-19 ENCOUNTER — CLINICAL SUPPORT (OUTPATIENT)
Dept: REHABILITATION | Facility: OTHER | Age: 79
End: 2024-12-19
Payer: MEDICARE

## 2024-12-19 ENCOUNTER — OFFICE VISIT (OUTPATIENT)
Dept: PAIN MEDICINE | Facility: CLINIC | Age: 79
End: 2024-12-19
Attending: ANESTHESIOLOGY
Payer: MEDICARE

## 2024-12-19 ENCOUNTER — HOSPITAL ENCOUNTER (OUTPATIENT)
Dept: RADIOLOGY | Facility: OTHER | Age: 79
Discharge: HOME | End: 2024-12-19
Attending: STUDENT IN AN ORGANIZED HEALTH CARE EDUCATION/TRAINING PROGRAM
Payer: MEDICARE

## 2024-12-19 VITALS
RESPIRATION RATE: 18 BRPM | HEIGHT: 62 IN | SYSTOLIC BLOOD PRESSURE: 133 MMHG | OXYGEN SATURATION: 100 % | DIASTOLIC BLOOD PRESSURE: 84 MMHG | HEART RATE: 78 BPM | TEMPERATURE: 98 F | WEIGHT: 141.13 LBS | BODY MASS INDEX: 25.97 KG/M2

## 2024-12-19 DIAGNOSIS — M19.019 SHOULDER ARTHRITIS: ICD-10-CM

## 2024-12-19 DIAGNOSIS — M25.69 DECREASED RANGE OF MOTION OF TRUNK AND BACK: Primary | ICD-10-CM

## 2024-12-19 DIAGNOSIS — M17.11 PRIMARY OSTEOARTHRITIS OF RIGHT KNEE: ICD-10-CM

## 2024-12-19 DIAGNOSIS — R29.898 DECREASED STRENGTH OF TRUNK AND BACK: ICD-10-CM

## 2024-12-19 DIAGNOSIS — M51.360 DEGENERATION OF INTERVERTEBRAL DISC OF LUMBAR REGION WITH DISCOGENIC BACK PAIN: Primary | ICD-10-CM

## 2024-12-19 PROCEDURE — 73030 X-RAY EXAM OF SHOULDER: CPT | Mod: 26,RT,, | Performed by: RADIOLOGY

## 2024-12-19 PROCEDURE — 99999 PR PBB SHADOW E&M-EST. PATIENT-LVL V: CPT | Mod: PBBFAC,,, | Performed by: ANESTHESIOLOGY

## 2024-12-19 PROCEDURE — 73562 X-RAY EXAM OF KNEE 3: CPT | Mod: 26,RT,, | Performed by: RADIOLOGY

## 2024-12-19 PROCEDURE — 99214 OFFICE O/P EST MOD 30 MIN: CPT | Mod: S$PBB,GC,, | Performed by: ANESTHESIOLOGY

## 2024-12-19 PROCEDURE — 73562 X-RAY EXAM OF KNEE 3: CPT | Mod: TC,FY,RT

## 2024-12-19 PROCEDURE — 73030 X-RAY EXAM OF SHOULDER: CPT | Mod: TC,FY,RT

## 2024-12-19 PROCEDURE — 99215 OFFICE O/P EST HI 40 MIN: CPT | Mod: PBBFAC,25 | Performed by: ANESTHESIOLOGY

## 2024-12-19 PROCEDURE — 97110 THERAPEUTIC EXERCISES: CPT | Mod: CQ

## 2024-12-19 NOTE — PROGRESS NOTES
"[facility]  Physical Therapy Treatment Note     Name: Caridad Hinds  Clinic Number: [id number]    Therapy Diagnosis:   Encounter Diagnoses   Name Primary?    Decreased range of motion of trunk and back Yes    Decreased strength of trunk and back            Physician: Lorena Beatty NP    Visit Date: 12/19/2024    Physician Orders: PT Eval and Treat  Medical Diagnosis from Referral: M51.36 (ICD-10-CM) - DDD (degenerative disc disease), lumbar; M48.062 (ICD-10-CM) - Spinal stenosis of lumbar region with neurogenic claudication; M54.16 (ICD-10-CM) - Lumbar radiculopathy  Evaluation Date: 10/15/2024  Authorization Period Expiration: 12/31/2024  Plan of Care Expiration: 12/24/2024  Reassessment Due: 1/10/2024  Visit # / Visits authorized: 10/20  (Re-test done visit 9)  MedX testing visit 1    PTA Visit #: 3/5     Time In: 12:00 pm  Time Out: 1:00  pm  Total Billable Time: 53 minutes  INSURANCE and OUTCOMES: Fee for Service with FOTO Outcomes 1/3     Precautions: standard     Pattern of pain determined: PEN    Subjective     Caridad Hinds reports good response to last session and feels pretty good today. She c/o min lumbar discomfort upon entry.     Patient reports tolerating previous visit fair  Patient reports their pain to be 1/10 on a 0-10 scale with 0 being no pain and 10 being the worst pain imaginable.  Pain Location: R > L low back, radiating symptoms into kye buttocks      Occupation: retired  Leisure: gardening, reading, treadmill 3x/week, gym      Pts goals: "be able to get back to the gym and aerobics"     Objective      Lumbar  Isometric Testing on Med X equipment: Testing administered by PT    Test Initial Baseline Midpoint Final   Date 10/17/2024 12/10/2024    ROM 0-48 deg 0-51    Max Peak Torque 71  59    Min Peak Torque 5  15    Flex/Ext Ratio 14.2:1 3.9:1    % variance  normative data 52 59    % change from initial test N/A visit 1 +22      Starting weight 30   " "  OUTCOMES SELECTION:   Intake Outcome Measure for FOTO Lumbar Survey     Therapist reviewed FOTO scores for Caridad Hinds on 10/15/2024.   FOTO documents entered into [employer] - see Media section.     Intake Score: 47% functional ability (SHASHANK: 20)   Goal Score: 62% functional ability (SHASHANK: 10)            Treatment     Caridad received the treatments listed below:      Medical MedX Treatment as follows:  MedX testing performed day 2: Patient  received neuromuscular education to engage spinal musculature correctly for motor control and engagement of musculature for 0 minutes including the MedX exercise component and practice and standard testing. MedX dynamic exercise and baseline isometric test performed with instructions to guide the patient safely through the testing procedure. Patient instructed to perform isometric test correctly and safely while building to an optimal force with a pain-free effort. Patient also instructed that they should feel support/pressure from MedX restraints but no pain/discomfort, and encouraged to report any pain to therapist. Patient demonstrated appropriate understanding of information and tolerance of test.  Education regarding purpose of test, safety during test given, and reviewed possible more soreness and strategies.                    Caridad participated in neuromuscular re-education activities to improve balance, coordination, proprioception, motor control and/or posture for 00 minutes. The following activities were included:        Caridad participated in therapeutic exercises to develop strength, endurance, ROM, flexibility, posture, and core stabilization for  53  minutes includin/19/2024    12:10 PM   [facility]   Visit Number 12   VAS Pain Rating 1   Time 5   Flexion in Lying 10   Lumbar Weight 36 lbs   Repetitions 20   Rating of Perceived Exertion 3   Ice - Z Lie (in min.) 5         LTR, x10x5"   Hamstring stretch, x2x30"   Bridge, x15x3"  SKTC, " "x10"x3  Piriformis, x2x20"   PPT, x10x5"-np  PPT to 90/90 holds 3x15"  90/90 heel taps 2x10  BKFO c/ PPT GTB 3 sec x10-np  TA contraction + SLR c/ PB x15   Paloff press x10 GTB  SOC x10         Peripheral muscle strengthening which included one set of 15-20 repetitions at a slow and controlled 10-13 second per rep pace focused on strengthening supporting musculature in order to improve body mechanics and functional mobility. Patient and therapist focused on proper form during treatment to ensure optimal strengthening of each targeted muscle group.  Machines utilized included:Torso rotation, Leg Ext, Leg Curl, Chest Press, and Rowing  Triceps, Biceps, Hip Abd, and Leg Press      Caridad participated in dynamic functional therapeutic activities to improve functional performance and simulate household and community activities for 0 minutes. The following activities were included:    Lifting mechanics and education  Proper lifting techniques  Neutral spine with hip hinge and knee with   20x    Sit to stand 10x      Lifting demonstration with crate and load transfer from floor to mat with 5x with lumbar muscular fatigue              Caridad received manual therapy techniques for 00  minutes. The following activities were included:      Pt given HEAT pack for 5 minutes to low back in sitting.    Patient Education and Home Exercises     Home exercises include:  LTR, hamstring stretch, bridge, SKTC, piriformis, PPT   Cardio program (V5): - 12/19/2024  Lifting education (V11): - 12/17/2024  Posture/Lumbar roll: TBD  Fridge Magnet Discharge handout (date given): -  Equipment at home/gym membership: no, stopped 3 months ago d/t pain       Education provided:   - Patient received education in benefits of progressing mobility and strengthening gradually  - Pt educated in proper body mechanics and lifting techniques.  - Discussed exertion scale, slow progressive resistance protocol to promote safe and healthy strengthening of " "supportive musculature  by performing 15-20 reps, 7 sec per rep, and increasing weight by 5 % at 20 reps only if there ex done safely, slowly, using correct musculature, exertion and no pain.  Patient expressed understanding.  -Pt educated on strategies to safely perform examination and exercise using "Stop Light" analogy to describe how to avoid or stop irritating motions, proceed with caution with some movements, and progress positive exercises.     Written Home Exercises Provided: Patient instructed to cont prior HEP.  Exercises were reviewed and Caridad was able to demonstrate them prior to the end of the session.  Caridad demonstrated good  understanding of the education provided.     See EMR under Patient Instructions for exercises provided 10/17/2024.    Assessment     Caridad presents to therapy with reports of min lumbar symptoms. Lumbopelvis therex performed to promote core stability for pain reduction to improve physical and functional movement. Lumbar MedX resistance maintained at 36 lbs/ft with completion of 20 reps at 3/10 RPE. Full peripheral resistance machines completed without difficulty. Progress per HB protocol and pt's tolerance.     Patient is making good progress towards established goals.  Pt will continue to benefit from skilled outpatient physical therapy to address the deficits stated in the impairment chart, provide pt/family education and to maximize pt's level of independence in the home and community environment.     Anticipated Barriers for therapy: chronicity of symptoms   Pt's spiritual, cultural and educational needs considered and pt agreeable to plan of care and goals as stated below:     GOALS: Pt is in agreement with the following goals.     Short term goals:  6 weeks or 10 visits   - Pt will demonstrate increased lumbar MedX ROM by at least 0-5 degrees from the initial ROM value with improvements noted in functional ROM and ability to perform ADLs. Met 12/10/2024  - Pt will " demonstrate increased MedX average isometric strength value by 5-10% from initial test resulting in improved ability to perform bending, lifting, and carrying activities safely, confidently. Met 12/10/2024  - Pt will report a reduction in worst pain score by 1-2 points for improved tolerance for standing for 15 minutes or more. Partially Met  - Pt able to perform HEP correctly with minimal cueing or supervision from therapist to encourage independent management of symptoms. Met 12/10/2024     Long term goals: 10 weeks or 20 visits   - Pt will demonstrate increased lumbar MedX ROM by at least 5-10 degrees from initial ROM value, resulting in improved ability to perform functional forward bending while standing and sitting. Appropriate and Ongoing  - Pt will demonstrate increased MedX average isometric strength value by 50-75% from initial test resulting in improved ability to perform bending, lifting, and carrying activities safely and confidently. Appropriate and Ongoing  - Pt to demonstrate ability to independently control and reduce their pain through posture positioning and mechanical movements throughout a typical day. Appropriate and Ongoing  - Pt will demonstrate reduced pain and improved functional outcomes as reported on the FOTO by reaching an intake score of >/= 62% functional ability in order to demonstrate subjective improvement in patient's condition. . Appropriate and Ongoing  - Pt will demonstrate independence with the HEP at discharge. Appropriate and Ongoing  - Patient will be able to complete forward bending while gardening with minimal to no complications indicating improved tolerance to activity. Appropriate and Ongoing       Plan     Continue with established Plan of Care towards established PT goals.     Therapist: Anna Smith, PTA  12/19/2024

## 2024-12-19 NOTE — PROGRESS NOTES
Chronic Pain Established Patient           PCP: Haris Saba Jr., MD      CHIEF COMPLAINT: lower back pain        INTERVAL HISTORY 12/19/24:  Ms. Hinds returns to clinic for follow-up of lower back pain.  She was sent to participate in healthy back program, which has been extremely helpful.  She continues to take gabapentin and Celebrex with some relief.  She believes the physical therapy has been more helpful than the medications. Patient's pain is in the L lower back and can move to the right, this can subside with the physical therapy. Describes the pain as aching and can become sharp. Exacerbated by overly exerting herself. Improved with PT. She denies red flag symptoms.     Patient complains of R knee pain that began one month ago. The pain is around the anterior knee. She describes the pain as sore, stiff and tight. Exacerbated with exertion and cold weather, improved with rest. She reports a similar pain in the R shoulder which extends into the R upper arm. She rates the knee pain as 2/10, at its worse 8/10. Shoulder pain is 2/10, at its worse 8/10.       INTERVAL HISTORY 9/18/2024:   Caridad Hinds returns to clinic for follow-up after right L4/5 and L5/S1 TFESI on 9/4/2024. She reports 60% pain relief. She continues gabapentin and celebrex with some relief. She denies any side effects.  She denies recent health changes. She denies recent falls or trauma. She denies new onset fever/night sweats, urinary incontinence, bowel incontinence, significant weight changes, significant motor weakness or changes, or loss of sensations. Her pain today is 5/10.      INTERVAL HISTORY 8/22/24:   Patients returns for follow up low back pain with radiculopathy. Patient underwent caudal JUSTICE on 7/24/24. This provided 75% relief for four or five days, but her pain and stiffness returned. Patient sent for lumbar and hip XR, results  below. Patient started on gabapentin and Celebrex which she continues to take with minimal benefit. Today, patient continues to have low back pain a 6/10, the pain occasionally radiates down the RLE laterally. Describes it is as constant aching, intermittently shooting.  Patient does note motor weakness to the right leg. She denies red flag symptoms.      Initial History 6/27/24:  Caridad Hinds presents to the clinic for the evaluation of low back pain radiating into her bilateral legs (L>>R). The pain started  8/2023 ago following no particular trauma or incident and symptoms have been worsening.The pain is located in the right lateral low back/hip area and radiates to the lateral thigh, anterior shin and foot.  The pain is described as aching although it was previously numb and tingling with associated heron horses and is rated as 5/10. The pain is rated with a score of  5/10 on the BEST day and a score of 7/10 on the WORST day.  Symptoms interfere with daily activity and work. The pain is exacerbated by cold, working in the garden, cleaning house, being on feet all day. She used to work out regularly and had to stop because of the pain.  The pain is mitigated by heat,      Patient denies night fever/night sweats, urinary incontinence, bowel incontinence, significant weight loss, significant motor weakness, and loss of sensations.  She does endorse         12/19/2024    10:43 AM   Last 3 PDI Scores   Pain Disability Index (PDI) 28           Conservative therapy:  PT: Participating in Healthy back program October 2024-Present  Chiro: none   HEP in the past 6 months: patient preforming daily HEP with limited benefit         Pain Medications:  Tylenol 1g daily  Gabapentin   Celebrex       report:  Reviewed and consistent with medication use as prescribed.     Pain Procedures:   2015 - JUSTICE with Dr. Bobo  7/24/2024 - caudal JUSTICE   9/4/2024 - right L4/5 and L5/S1 TFESI - 60% relief     Imaging:   XR LUMBAR SPINE AP  AND LAT WITH FLEX/EXT 6/27/24     CLINICAL HISTORY:  Spondylolisthesis, lumbar region     TECHNIQUE:  AP and lateral views as well as lateral flexion and extension images are performed through the lumbar spine.     COMPARISON:  11/01/2023     FINDINGS:  Lumbar vertebral body heights are maintained.     Disc space narrowing and endplate changes at nearly every level.  Facet arthropathy at a centrally every level..     Slight grade 1 anterolisthesis L3-4 and L4-5 with no significant change with flexion or extension.  Levoconvex curvature thoracolumbar spine.     Impression:     No acute osseous abnormality seen.     Multilevel degenerative change with grade 1 anterolisthesis L3-4 and L4-5.  No evidence for dynamic instability.     XR HIPS BILATERAL 2 VIEW INCL AP PELVIS 6/27/24     CLINICAL HISTORY:  Spondylolisthesis, lumbar region     TECHNIQUE:  AP view of the pelvis and frogleg lateral views of both hips were performed.     COMPARISON:  04/19/2016     FINDINGS:  Femoral heads are well located with respect to the acetabula.  Femoral heads maintain a normal round contour.  No acute fracture seen.  Mild osteophyte formation and narrowing of the femoroacetabular joints     Impression:     No acute osseous abnormality seen.     11/1/2024 XRAY Report/ Interpretation :    AP pelvis x-ray was taken today. Indications low back pain and/or hip pain. Findings AP pelvis x-ray appears to be normal with no evidence of fractures or significant degenerative disease  AP and lateral x-rays of lumbar spine will performed today. Indications low back pain. Findings:  Grade 1 degenerative spondylolisthesis L4-5 marked disc space narrowing lower lumbar spine most marked multilevel    Lumber MRI 2023          Lumbar MRI report from 2015   was reviewed significant spinal stenosis at L4-5 and degenerative spondylolisthesis at L4-5 actual films were not available       Past Medical History:   Diagnosis Date    Hyperlipidemia      Hypertension     Thyroid disease      Past Surgical History:   Procedure Laterality Date    EPIDURAL STEROID INJECTION N/A 7/24/2024    Procedure: CAUDAL JUSTICE *PLAVIX CLEARANCE REQUESTED*;  Surgeon: Yonas Pelaez MD;  Location: [facility];  Service: Pain Management;  Laterality: N/A;  [phone]  2 WK F/U FRANCESCA    falopian Left 1986    ectopic pregnancy    THYROIDECTOMY, PARTIAL N/A 1972    TRANSFORAMINAL EPIDURAL INJECTION OF STEROID Right 9/4/2024    Procedure: LUMBAR TRANSFORAMNAL RIGHT L4/5 AND L5/S1 *PLAVIX CLEARANCE REQUESTED*;  Surgeon: Yonas Pelaez MD;  Location: [facility];  Service: Pain Management;  Laterality: Right;  [phone]  2 WK F/U FRANCESCA     Social History     Socioeconomic History    Marital status:    Tobacco Use    Smoking status: Never    Smokeless tobacco: Never   Substance and Sexual Activity    Alcohol use: No     Social Drivers of Health     Financial Resource Strain: Patient Declined (10/6/2024)    Received from [facility]    Overall Financial Resource Strain (CARDIA)     Difficulty of Paying Living Expenses: Patient declined   Food Insecurity: Patient Declined (10/6/2024)    Received from [facility]    Hunger Vital Sign     Worried About Running Out of Food in the Last Year: Patient declined     Ran Out of Food in the Last Year: Patient declined   Transportation Needs: No Transportation Needs (10/6/2024)    Received from [facility]    PRAPARE - Transportation     Lack of Transportation (Medical): No     Lack of Transportation (Non-Medical): No   Physical Activity: Unknown (10/6/2024)    Received from [facility]    Exercise Vital Sign     Days of Exercise per Week: 3 days   Recent Concern: Physical Activity - Insufficiently Active (10/6/2024)    Received from [facility]    Exercise Vital Sign     Days of Exercise per Week: 3 days     Minutes of Exercise per Session: 30 min   Stress: No Stress Concern Present (10/6/2024)    Received from [facility]    [employer] - Occupational Stress Questionnaire     Feeling of Stress : Not at all   Housing Stability: Unknown (10/6/2024)    Received from [facility]    Housing Stability Vital Sign     Unable to Pay for Housing in the Last Year: Patient declined     No family history on file.    Review of patient's allergies indicates:   Allergen Reactions    Darvocet a500 [propoxyphene n-acetaminophen] Hives     Combination drug containing Darvocet, flexeril, & ibuprofen caused hives; does not know which ingredient caused the reaction    Flexeril [cyclobenzaprine] Hives     Combination drug containing Darvocet, flexeril, & ibuprofen caused hives; does not know which ingredient caused the reaction    Motrin [ibuprofen] Hives     Combination drug containing Darvocet, flexeril, & ibuprofen caused hives; does not know which ingredient caused the reaction    Hydrocodone-acetaminophen Other (See Comments)     Other reaction(s): NVD      Sulfamethoxazole-trimethoprim Other (See Comments)    Tramadol Other (See Comments)     Other reaction(s): NVD         Current Outpatient Medications   Medication Sig    cholecalciferol, vitamin D3, 10 mcg (400 unit) Cap capsule Take by mouth.    clopidogreL (PLAVIX) 75 mg tablet Take 75 mg by mouth.    dicyclomine (BENTYL) 20 mg tablet Take 20 mg by mouth 3 (three) times daily.    famotidine (PEPCID) 40 MG tablet Take 40 mg by mouth every evening.    gabapentin (NEURONTIN) 300 MG capsule Take 1 capsule (300 mg total) by mouth 3 (three) times daily.    metoprolol succinate (TOPROL-XL) 100 MG 24 hr tablet Take 100 mg by mouth.    metoprolol tartrate (LOPRESSOR) 50 MG tablet Take 50 mg by mouth 2 (two) times daily.    olmesartan-hydrochlorothiazide (BENICAR HCT) 40-25 mg per tablet Take 1 tablet by mouth once daily.    potassium chloride SA (K-DUR,KLOR-CON) 10 MEQ tablet Take 10 mEq by mouth once daily.    RESTASIS 0.05 % ophthalmic emulsion Place 1 drop into both eyes 2 (two) times  "daily.    rosuvastatin (CRESTOR) 10 MG tablet Take 10 mg by mouth.    thyroid, pork, (ARMOUR THYROID) 60 mg Tab Take 60 mg by mouth once daily.     No current facility-administered medications for this visit.       ROS:  GENERAL: No fever. No chills. No fatigue. Denies weight loss. Denies weight gain.  HEENT: Denies headaches. Denies vision change. Denies eye pain. Denies double vision. Denies ear pain.   CV: Denies chest pain.   PULM: Denies of shortness of breath.  GI: Denies constipation. No diarrhea. No abdominal pain. Denies nausea. Denies vomiting. No blood in stool.  HEME: Denies bleeding problems.  : Denies urgency. No painful urination. No blood in urine.  MS: Denies joint stiffness. Denies joint swelling.    SKIN: Denies rash.   NEURO: Denies seizures. No weakness.  PSYCH:  Denies difficulty sleeping. No anxiety. Denies depression. No suicidal thoughts.       VITALS:   Vitals:    12/19/24 1042   BP: 133/84   Pulse: 78   Resp: 18   Temp: 98 °F (36.7 °C)   SpO2: 100%   Weight: 64 kg (141 lb 1.5 oz)   Height: 5' 2" (1.575 m)   PainSc:   4   PainLoc: Knee         PHYSICAL EXAM:   GENERAL: Well appearing, in no acute distress, alert and oriented x3.  PSYCH:  Mood and affect appropriate.  SKIN: Skin color, texture, turgor normal, no rashes or lesions.  HEENT:  Normocephalic, atraumatic. Cranial nerves grossly intact.  NECK:   Negative for pain to palpation over the cervical paraspinous muscles.   Negative for pain to palpation over facets.  Negative for pain with neck flexion, extension  Positive for pain with lateral rotation to the R side   Spurling test was Negative Bilaterally  PULM: No evidence of respiratory difficulty, symmetric chest rise.  GI:  Non-distended  BACK:   Normal range of motion.   Negative for pain to palpation over the spinous processes.  Negative for pain to palpation over facet joints.   Negative axial loading test bilateral.   POSITIVE for tenderness over BILATERAL SIJ.   Negative " Bilaterally thigh and sacral thrust test  Negative Bilaterally FABERE, Ganselin and Yeoman's test.   EXTREMITIES:   No deformities, edema, or skin discoloration.   Full ROM of bilateral shoulders. Positive Onofre and Empty Can on R side. Tender to palpation over posterior and anterior R shoulder.   R Knee: Swollen, non tender to palpation. Limited flexion and full extension on AROM. Crepitus noted on movement.   L Knee: Swollen, non tender  FADIR was Negative Bilaterally.   NEURO: Sensation is equal and appropriate bilaterally. Bilateral upper and lower extremity strength is normal and symmetric. Bilateral upper and lower extremity coordination and muscle stretch reflexes are physiologic and symmetric. Plantar response are downgoing. Straight leg raising in the supine position is Negative Bilaterally to radicular pain.   GAIT: Antalgic gait              ASSESSMENT: 79 y.o. year old with back, shoulder and knee pain, consistent with:    1. Degeneration of intervertebral disc of lumbar region with discogenic back pain        2. Primary osteoarthritis of right knee  CANCELED: X-Ray Knee Complete 4 Or More Views Right      3. Shoulder arthritis  X-ray Shoulder 2 or More Views Right                PLAN:    Patient Education:  Discussed the importance of physical therapy, activity modification, and a home exercise plan to help with pain and improve health.  Discussed that she should continue with Healthy back and the home exercises that she enjoys so much.   Obtain X-ray of R shoulder and R knee to evaluate for Arthritis. Patient may be suffering from rotator cuff tear. Will continue to follow if her pain improves with NSAIDs and voltaren gel.   Therapy referral:    Medications:   Patient can continue with pain medications for now per their provider since they are providing benefits, using them appropriately, and without side effects.  Recommend patient obtain Voltaren gel and apply to both her shoulder and knee. Patient  can utilize lidocaine patches as well.   Schedule pain intervention for: none at this time    Future consideration: knee or shoulder injection depending on how she progresses over the next eight weeks  Counseled patient: regarding the importance of activity modification, constant sleeping habits, and physical therapy.  Return to clinic: 4-8 weeks with RFANCESCA to discuss her XR's as well as she if she would like to proceed with an injection.           Jorge Alberto Hill\   I have personally reviewed the history and exam of this patient and agree with the resident/fellow/NPs note as stated above.    Yonas Pelaez MD    12/19/2024

## 2025-01-09 ENCOUNTER — CLINICAL SUPPORT (OUTPATIENT)
Dept: REHABILITATION | Facility: OTHER | Age: 80
End: 2025-01-09
Payer: MEDICARE

## 2025-01-09 DIAGNOSIS — M25.69 DECREASED RANGE OF MOTION OF TRUNK AND BACK: Primary | ICD-10-CM

## 2025-01-09 DIAGNOSIS — R29.898 DECREASED STRENGTH OF TRUNK AND BACK: ICD-10-CM

## 2025-01-09 PROCEDURE — 97110 THERAPEUTIC EXERCISES: CPT

## 2025-01-09 PROCEDURE — 97112 NEUROMUSCULAR REEDUCATION: CPT

## 2025-01-09 NOTE — PLAN OF CARE
"[facility]  Physical Therapy Treatment Note     Name: Caridad Hinds  Clinic Number: [id number]    Therapy Diagnosis:   Encounter Diagnoses   Name Primary?    Decreased range of motion of trunk and back Yes    Decreased strength of trunk and back            Physician: Lorena Beatty NP    Visit Date: 1/9/2025    Physician Orders: PT Eval and Treat  Medical Diagnosis from Referral: M51.36 (ICD-10-CM) - DDD (degenerative disc disease), lumbar; M48.062 (ICD-10-CM) - Spinal stenosis of lumbar region with neurogenic claudication; M54.16 (ICD-10-CM) - Lumbar radiculopathy  Evaluation Date: 10/15/2024  Authorization Period Expiration: 06/25/2025  Plan of Care Expiration: 2/20/2025  Reassessment Due: 2/10/2024  Visit # / Visits authorized: 13/20    MedX testing visit 1    PTA Visit #: 0/5     Time In: 12:30 pm  Time Out: 1:30  pm  Total Billable Time: 55 minutes  INSURANCE and OUTCOMES: Fee for Service with FOTO Outcomes 1/3     Precautions: standard     Pattern of pain determined: PEN    Subjective     Caridad Hinds reports she has been completing her exercises consistently and  notices she does not have to take as much medication. Since the weather has been cold she had been stiff in the back, hip and down the R leg.     Patient reports tolerating previous visit fair  Patient reports their pain to be 1/10 on a 0-10 scale with 0 being no pain and 10 being the worst pain imaginable.  Pain Location: R > L low back, radiating symptoms into kye buttocks      Occupation: retired  Leisure: gardening, reading, treadmill 3x/week, gym      Pts goals: "be able to get back to the gym and aerobics"     Objective      Lumbar  Isometric Testing on Med X equipment: Testing administered by PT    Test Initial Baseline Midpoint Final   Date 10/17/2024 12/10/2024    ROM 0-48 deg 0-51    Max Peak Torque 71  59    Min Peak Torque 5  15    Flex/Ext Ratio 14.2:1 3.9:1    % variance  normative data 52 59  "   % change from initial test N/A visit 1 +22      Starting weight 30     OUTCOMES SELECTION:   Intake Outcome Measure for FOTO Lumbar Survey     Therapist reviewed FOTO scores for Caridad Hinds on 10/15/2024.   FOTO documents entered into [employer] - see Media section.     Intake Score: 47% functional ability (SHASHANK: 20)   11/06/2024: 57%  12/11/2024: 59%  Goal Score: 62% functional ability (SHASHANK: 10)            Assessment     Caridad presents to therapy after several weeks with reports of decreased overall discomfort and ability to manage symptoms with stretches and without the need for medications. She is feeling stiff and sore today and attributes that to the cold weather. She has been consistent with her exercises and appreciate the education that she has been given. Resumed stretches and progressed strengthening exercises with cues to improved activation of the lumbar extensors with proper execution. Maintained resistance on the lumbar medx at 38 ft lbs, she was able to complete 20 repetitions at 3/10 RPE and plan to progress as tolerated. Will continue with full peripheral strengthening and continue to progress functional strengthening.     Patient is making good progress towards established goals.  Pt will continue to benefit from skilled outpatient physical therapy to address the deficits stated in the impairment chart, provide pt/family education and to maximize pt's level of independence in the home and community environment.     Anticipated Barriers for therapy: chronicity of symptoms   Pt's spiritual, cultural and educational needs considered and pt agreeable to plan of care and goals as stated below:     GOALS: Pt is in agreement with the following goals.     Short term goals:  6 weeks or 10 visits   - Pt will demonstrate increased lumbar MedX ROM by at least 0-5 degrees from the initial ROM value with improvements noted in functional ROM and ability to perform ADLs. Met 12/10/2024  - Pt will demonstrate  increased MedX average isometric strength value by 5-10% from initial test resulting in improved ability to perform bending, lifting, and carrying activities safely, confidently. Met 12/10/2024  - Pt will report a reduction in worst pain score by 1-2 points for improved tolerance for standing for 15 minutes or more. Partially Met  - Pt able to perform HEP correctly with minimal cueing or supervision from therapist to encourage independent management of symptoms. Met 12/10/2024     Long term goals: 10 weeks or 20 visits   - Pt will demonstrate increased lumbar MedX ROM by at least 5-10 degrees from initial ROM value, resulting in improved ability to perform functional forward bending while standing and sitting. Appropriate and Ongoing  - Pt will demonstrate increased MedX average isometric strength value by 50-75% from initial test resulting in improved ability to perform bending, lifting, and carrying activities safely and confidently. Appropriate and Ongoing  - Pt to demonstrate ability to independently control and reduce their pain through posture positioning and mechanical movements throughout a typical day. Appropriate and Ongoing  - Pt will demonstrate reduced pain and improved functional outcomes as reported on the FOTO by reaching an intake score of >/= 62% functional ability in order to demonstrate subjective improvement in patient's condition. . Appropriate and Ongoing  - Pt will demonstrate independence with the HEP at discharge. Appropriate and Ongoing  - Patient will be able to complete forward bending while gardening with minimal to no complications indicating improved tolerance to activity. Appropriate and Ongoing       Plan     Continue 5-6 weeks with established Plan of Care towards established PT goals.     Therapist: Mk Jose, PT  1/9/2025

## 2025-01-09 NOTE — PROGRESS NOTES
"[facility]  Physical Therapy Treatment Note     Name: Caridad Hinds  Clinic Number: [id number]    Therapy Diagnosis:   Encounter Diagnoses   Name Primary?    Decreased range of motion of trunk and back Yes    Decreased strength of trunk and back            Physician: Lorena Beatty NP    Visit Date: 1/9/2025    Physician Orders: PT Eval and Treat  Medical Diagnosis from Referral: M51.36 (ICD-10-CM) - DDD (degenerative disc disease), lumbar; M48.062 (ICD-10-CM) - Spinal stenosis of lumbar region with neurogenic claudication; M54.16 (ICD-10-CM) - Lumbar radiculopathy  Evaluation Date: 10/15/2024  Authorization Period Expiration: 06/25/2025  Plan of Care Expiration: 2/20/2025  Reassessment Due: 2/10/2024  Visit # / Visits authorized: 13/20    MedX testing visit 1    PTA Visit #: 0/5     Time In: 12:30 pm  Time Out: 1:30  pm  Total Billable Time: 55 minutes  INSURANCE and OUTCOMES: Fee for Service with FOTO Outcomes 1/3     Precautions: standard     Pattern of pain determined: PEN    Subjective     Caridad Hinds reports she has been completing her exercises consistently and  notices she does not have to take as much medication. Since the weather has been cold she had been stiff in the back, hip and down the R leg.     Patient reports tolerating previous visit fair  Patient reports their pain to be 1/10 on a 0-10 scale with 0 being no pain and 10 being the worst pain imaginable.  Pain Location: R > L low back, radiating symptoms into kye buttocks      Occupation: retired  Leisure: gardening, reading, treadmill 3x/week, gym      Pts goals: "be able to get back to the gym and aerobics"     Objective      Lumbar  Isometric Testing on Med X equipment: Testing administered by PT    Test Initial Baseline Midpoint Final   Date 10/17/2024 12/10/2024    ROM 0-48 deg 0-51    Max Peak Torque 71  59    Min Peak Torque 5  15    Flex/Ext Ratio 14.2:1 3.9:1    % variance  normative data 52 59  "   % change from initial test N/A visit 1 +22      Starting weight 30     OUTCOMES SELECTION:   Intake Outcome Measure for FOTO Lumbar Survey     Therapist reviewed FOTO scores for Caridad Hinds on 10/15/2024.   FOTO documents entered into [employer] - see Media section.     Intake Score: 47% functional ability (SHASHANK: 20)   2024: 57%  2024: 59%  Goal Score: 62% functional ability (SHASHANK: 10)            Treatment     Caridad received the treatments listed below:      Medical MedX Treatment as follows:  MedX testing performed day 2: Patient  received neuromuscular education to engage spinal musculature correctly for motor control and engagement of musculature for 0 minutes including the MedX exercise component and practice and standard testing. MedX dynamic exercise and baseline isometric test performed with instructions to guide the patient safely through the testing procedure. Patient instructed to perform isometric test correctly and safely while building to an optimal force with a pain-free effort. Patient also instructed that they should feel support/pressure from MedX restraints but no pain/discomfort, and encouraged to report any pain to therapist. Patient demonstrated appropriate understanding of information and tolerance of test.  Education regarding purpose of test, safety during test given, and reviewed possible more soreness and strategies.                Caridad participated in neuromuscular re-education activities to improve balance, coordination, proprioception, motor control and/or posture for 00 minutes. The following activities were included:        Caridad participated in therapeutic exercises to develop strength, endurance, ROM, flexibility, posture, and core stabilization for  53  minutes includin/9/2025    12:58 PM   [facility]   Visit Number 13   VAS Pain Rating 2   Time 5   Flexion in Lying 10   Lumbar Weight 36 lbs   Repetitions 20   Rating of Perceived Exertion 3   Ice - Z  "Lie (in min.) 5           LTR, x10x5"   Hamstring stretch, x2x30"   Bridge, x15x3"  DKTC, x10"x3 with ball  Piriformis, x2x20"   PPT, x10x5"-np  PPT to 90/90 holds 3x15"  90/90 heel taps 2x10  BKFO c/ PPT GTB 3 sec x10-np  TA contraction + SLR c/ PB x15   Paloff press x10 GTB  SOC x10         Peripheral muscle strengthening which included one set of 15-20 repetitions at a slow and controlled 10-13 second per rep pace focused on strengthening supporting musculature in order to improve body mechanics and functional mobility. Patient and therapist focused on proper form during treatment to ensure optimal strengthening of each targeted muscle group.  Machines utilized included:Torso rotation, Leg Ext, Leg Curl, Chest Press, and Rowing  Triceps, Biceps, Hip Abd, and Leg Press      Caridad participated in dynamic functional therapeutic activities to improve functional performance and simulate household and community activities for 0 minutes. The following activities were included:    Lifting mechanics and education  Proper lifting techniques  Neutral spine with hip hinge and knee with   20x    Sit to stand 10x      Lifting demonstration with crate and load transfer from floor to mat with 5x with lumbar muscular fatigue              Caridad received manual therapy techniques for 00  minutes. The following activities were included:      Pt given HEAT pack for 5 minutes to low back in sitting.    Patient Education and Home Exercises     Home exercises include:  LTR, hamstring stretch, bridge, SKTC, piriformis, PPT   Cardio program (V5): - 1/9/2025  Lifting education (V11): - 12/17/2024  Posture/Lumbar roll: TBD  [employer] Discharge handout (date given): -  Equipment at home/gym membership: no, stopped 3 months ago d/t pain       Education provided:   - Patient received education in benefits of progressing mobility and strengthening gradually  - Pt educated in proper body mechanics and lifting techniques.  - Discussed " "exertion scale, slow progressive resistance protocol to promote safe and healthy strengthening of supportive musculature  by performing 15-20 reps, 7 sec per rep, and increasing weight by 5 % at 20 reps only if there ex done safely, slowly, using correct musculature, exertion and no pain.  Patient expressed understanding.  -Pt educated on strategies to safely perform examination and exercise using "Stop Light" analogy to describe how to avoid or stop irritating motions, proceed with caution with some movements, and progress positive exercises.     Written Home Exercises Provided: Patient instructed to cont prior HEP.  Exercises were reviewed and Caridad was able to demonstrate them prior to the end of the session.  Caridad demonstrated good  understanding of the education provided.     See EMR under Patient Instructions for exercises provided prior visit.    Assessment     Caridad presents to therapy after several weeks with reports of decreased overall discomfort and ability to manage symptoms with stretches and without the need for medications. She is feeling stiff and sore today and attributes that to the cold weather. She has been consistent with her exercises and appreciate the education that she has been given. Resumed stretches and progressed strengthening exercises with cues to improved activation of the lumbar extensors with proper execution. Maintained resistance on the lumbar medx at 38 ft lbs, she was able to complete 20 repetitions at 3/10 RPE and plan to progress as tolerated. Will continue with full peripheral strengthening and continue to progress functional strengthening.     Patient is making good progress towards established goals.  Pt will continue to benefit from skilled outpatient physical therapy to address the deficits stated in the impairment chart, provide pt/family education and to maximize pt's level of independence in the home and community environment.     Anticipated Barriers for therapy: " chronicity of symptoms   Pt's spiritual, cultural and educational needs considered and pt agreeable to plan of care and goals as stated below:     GOALS: Pt is in agreement with the following goals.     Short term goals:  6 weeks or 10 visits   - Pt will demonstrate increased lumbar MedX ROM by at least 0-5 degrees from the initial ROM value with improvements noted in functional ROM and ability to perform ADLs. Met 12/10/2024  - Pt will demonstrate increased MedX average isometric strength value by 5-10% from initial test resulting in improved ability to perform bending, lifting, and carrying activities safely, confidently. Met 12/10/2024  - Pt will report a reduction in worst pain score by 1-2 points for improved tolerance for standing for 15 minutes or more. Partially Met  - Pt able to perform HEP correctly with minimal cueing or supervision from therapist to encourage independent management of symptoms. Met 12/10/2024     Long term goals: 10 weeks or 20 visits   - Pt will demonstrate increased lumbar MedX ROM by at least 5-10 degrees from initial ROM value, resulting in improved ability to perform functional forward bending while standing and sitting. Appropriate and Ongoing  - Pt will demonstrate increased MedX average isometric strength value by 50-75% from initial test resulting in improved ability to perform bending, lifting, and carrying activities safely and confidently. Appropriate and Ongoing  - Pt to demonstrate ability to independently control and reduce their pain through posture positioning and mechanical movements throughout a typical day. Appropriate and Ongoing  - Pt will demonstrate reduced pain and improved functional outcomes as reported on the FOTO by reaching an intake score of >/= 62% functional ability in order to demonstrate subjective improvement in patient's condition. . Appropriate and Ongoing  - Pt will demonstrate independence with the HEP at discharge. Appropriate and Ongoing  -  Patient will be able to complete forward bending while gardening with minimal to no complications indicating improved tolerance to activity. Appropriate and Ongoing       Plan     Continue with established Plan of Care towards established PT goals.     Therapist: Mk Jose, PT  1/9/2025

## 2025-01-14 ENCOUNTER — CLINICAL SUPPORT (OUTPATIENT)
Dept: REHABILITATION | Facility: OTHER | Age: 80
End: 2025-01-14
Payer: MEDICARE

## 2025-01-14 DIAGNOSIS — M25.69 DECREASED RANGE OF MOTION OF TRUNK AND BACK: Primary | ICD-10-CM

## 2025-01-14 DIAGNOSIS — R29.898 DECREASED STRENGTH OF TRUNK AND BACK: ICD-10-CM

## 2025-01-14 PROCEDURE — 97110 THERAPEUTIC EXERCISES: CPT | Mod: CQ

## 2025-01-14 NOTE — PROGRESS NOTES
"[facility]  Physical Therapy Treatment Note     Name: Caridad Hinds  Clinic Number: [id number]    Therapy Diagnosis:   Encounter Diagnoses   Name Primary?    Decreased range of motion of trunk and back Yes    Decreased strength of trunk and back            Physician: Lorena Beatty NP    Visit Date: 1/14/2025    Physician Orders: PT Eval and Treat  Medical Diagnosis from Referral: M51.36 (ICD-10-CM) - DDD (degenerative disc disease), lumbar; M48.062 (ICD-10-CM) - Spinal stenosis of lumbar region with neurogenic claudication; M54.16 (ICD-10-CM) - Lumbar radiculopathy  Evaluation Date: 10/15/2024  Authorization Period Expiration: 06/25/2025  Plan of Care Expiration: 2/20/2025  Reassessment Due: 2/10/2024  Visit # / Visits authorized: 13/20    MedX testing visit 1    PTA Visit #: 1/5     Time In: 11:10 am  Time Out: 12:10  pm  Total Billable Time: 55 minutes  INSURANCE and OUTCOMES: Fee for Service with FOTO Outcomes 1/3     Precautions: standard     Pattern of pain determined: PEN    Subjective     Caridad Hinds reports overall improvements with therapy and denies pain today. She reports experiencing increase R shld and R knee pain yesterday due to the cold weather, managing symptoms with lidocaine patches.    Patient reports tolerating previous visit fair  Patient reports their pain to be 0/10 on a 0-10 scale with 0 being no pain and 10 being the worst pain imaginable.  Pain Location: R > L low back, radiating symptoms into kye buttocks      Occupation: retired  Leisure: gardening, reading, treadmill 3x/week, gym      Pts goals: "be able to get back to the gym and aerobics"     Objective      Lumbar  Isometric Testing on Med X equipment: Testing administered by PT    Test Initial Baseline Midpoint Final   Date 10/17/2024 12/10/2024    ROM 0-48 deg 0-51    Max Peak Torque 71  59    Min Peak Torque 5  15    Flex/Ext Ratio 14.2:1 3.9:1    % variance  normative data 52 59    % " change from initial test N/A visit 1 +22      Starting weight 30     OUTCOMES SELECTION:   Intake Outcome Measure for FOTO Lumbar Survey     Therapist reviewed FOTO scores for Caridad Hinds on 10/15/2024.   FOTO documents entered into [employer] - see Media section.     Intake Score: 47% functional ability (SHASHANK: 20)   2024: 57%  2024: 59%  Goal Score: 62% functional ability (SHASHANK: 10)            Treatment     Caridad received the treatments listed below:      Medical MedX Treatment as follows:  MedX testing performed day 2: Patient  received neuromuscular education to engage spinal musculature correctly for motor control and engagement of musculature for 0 minutes including the MedX exercise component and practice and standard testing. MedX dynamic exercise and baseline isometric test performed with instructions to guide the patient safely through the testing procedure. Patient instructed to perform isometric test correctly and safely while building to an optimal force with a pain-free effort. Patient also instructed that they should feel support/pressure from MedX restraints but no pain/discomfort, and encouraged to report any pain to therapist. Patient demonstrated appropriate understanding of information and tolerance of test.  Education regarding purpose of test, safety during test given, and reviewed possible more soreness and strategies.                      Caridad participated in neuromuscular re-education activities to improve balance, coordination, proprioception, motor control and/or posture for 00 minutes. The following activities were included:        Caridad participated in therapeutic exercises to develop strength, endurance, ROM, flexibility, posture, and core stabilization for  55  minutes includin/14/2025    11:35 AM   [facility]   Visit Number 14   VAS Pain Rating 0   Time 5   Flexion in Lying 10   Lumbar Weight 39 lbs   Repetitions 20   Rating of Perceived Exertion 3   Ice -  "Z Lie (in min.) 5              LTR, x10x5"   Hamstring stretch, x2x30" -np  Bridge, x15x3"  DKTC, x10"x3 with ball  Piriformis, x2x20"   PPT, x10x5"-np  PPT to 90/90 holds 3x15"  90/90 heel taps 2x10  BKFO c/ PPT GTB 3 sec x10  TA contraction + SLR c/ PB x10  Paloff press x10 GTB  SOC x10         Peripheral muscle strengthening which included one set of 15-20 repetitions at a slow and controlled 10-13 second per rep pace focused on strengthening supporting musculature in order to improve body mechanics and functional mobility. Patient and therapist focused on proper form during treatment to ensure optimal strengthening of each targeted muscle group.  Machines utilized included:Torso rotation, Leg Ext, Leg Curl, Chest Press, and Rowing  Triceps, Biceps, Hip Abd, and Leg Press      Caridad participated in dynamic functional therapeutic activities to improve functional performance and simulate household and community activities for 0 minutes. The following activities were included:    Lifting mechanics and education  Proper lifting techniques  Neutral spine with hip hinge and knee with   20x    Sit to stand 10x      Lifting demonstration with crate and load transfer from floor to mat with 5x with lumbar muscular fatigue              Caridad received manual therapy techniques for 00  minutes. The following activities were included:      Pt given HEAT pack for 5 minutes to low back in sitting.    Patient Education and Home Exercises     Home exercises include:  LTR, hamstring stretch, bridge, SKTC, piriformis, PPT   Cardio program (V5): - 1/14/2025  Lifting education (V11): - 12/17/2024  Posture/Lumbar roll: TBD  [employer] Discharge handout (date given): -  Equipment at home/gym membership: no, stopped 3 months ago d/t pain       Education provided:   - Patient received education in benefits of progressing mobility and strengthening gradually  - Pt educated in proper body mechanics and lifting techniques.  - Discussed " "exertion scale, slow progressive resistance protocol to promote safe and healthy strengthening of supportive musculature  by performing 15-20 reps, 7 sec per rep, and increasing weight by 5 % at 20 reps only if there ex done safely, slowly, using correct musculature, exertion and no pain.  Patient expressed understanding.  -Pt educated on strategies to safely perform examination and exercise using "Stop Light" analogy to describe how to avoid or stop irritating motions, proceed with caution with some movements, and progress positive exercises.     Written Home Exercises Provided: Patient instructed to cont prior HEP.  Exercises were reviewed and Caridad was able to demonstrate them prior to the end of the session.  Caridad demonstrated good  understanding of the education provided.     See EMR under Patient Instructions for exercises provided prior visit.    Assessment     Caridad presents to therapy with reports of overall improvements despite R shld and R knee pain. She was able to perform therex w/o adverse effects, requiring min cues throughout session to improved activation of the lumbar extensors with proper execution. Resistance increased  on the lumbar medx to 39 ft lbs, she was able to complete 20 repetitions at 3/10 RPE and plan to progress as tolerated. Will continue with full peripheral strengthening and continue to progress functional strengthening.     Patient is making good progress towards established goals.  Pt will continue to benefit from skilled outpatient physical therapy to address the deficits stated in the impairment chart, provide pt/family education and to maximize pt's level of independence in the home and community environment.     Anticipated Barriers for therapy: chronicity of symptoms   Pt's spiritual, cultural and educational needs considered and pt agreeable to plan of care and goals as stated below:     GOALS: Pt is in agreement with the following goals.     Short term goals:  6 weeks or " 10 visits   - Pt will demonstrate increased lumbar MedX ROM by at least 0-5 degrees from the initial ROM value with improvements noted in functional ROM and ability to perform ADLs. Met 12/10/2024  - Pt will demonstrate increased MedX average isometric strength value by 5-10% from initial test resulting in improved ability to perform bending, lifting, and carrying activities safely, confidently. Met 12/10/2024  - Pt will report a reduction in worst pain score by 1-2 points for improved tolerance for standing for 15 minutes or more. Partially Met  - Pt able to perform HEP correctly with minimal cueing or supervision from therapist to encourage independent management of symptoms. Met 12/10/2024     Long term goals: 10 weeks or 20 visits   - Pt will demonstrate increased lumbar MedX ROM by at least 5-10 degrees from initial ROM value, resulting in improved ability to perform functional forward bending while standing and sitting. Appropriate and Ongoing  - Pt will demonstrate increased MedX average isometric strength value by 50-75% from initial test resulting in improved ability to perform bending, lifting, and carrying activities safely and confidently. Appropriate and Ongoing  - Pt to demonstrate ability to independently control and reduce their pain through posture positioning and mechanical movements throughout a typical day. Appropriate and Ongoing  - Pt will demonstrate reduced pain and improved functional outcomes as reported on the FOTO by reaching an intake score of >/= 62% functional ability in order to demonstrate subjective improvement in patient's condition. . Appropriate and Ongoing  - Pt will demonstrate independence with the HEP at discharge. Appropriate and Ongoing  - Patient will be able to complete forward bending while gardening with minimal to no complications indicating improved tolerance to activity. Appropriate and Ongoing       Plan     Continue with established Plan of Care towards established  PT goals.     Therapist: Anna Smith, PTA  1/14/2025

## 2025-01-16 ENCOUNTER — CLINICAL SUPPORT (OUTPATIENT)
Dept: REHABILITATION | Facility: OTHER | Age: 80
End: 2025-01-16
Payer: MEDICARE

## 2025-01-16 DIAGNOSIS — M25.69 DECREASED RANGE OF MOTION OF TRUNK AND BACK: Primary | ICD-10-CM

## 2025-01-16 DIAGNOSIS — R29.898 DECREASED STRENGTH OF TRUNK AND BACK: ICD-10-CM

## 2025-01-16 PROCEDURE — 97112 NEUROMUSCULAR REEDUCATION: CPT | Mod: CQ

## 2025-01-16 PROCEDURE — 97110 THERAPEUTIC EXERCISES: CPT | Mod: CQ

## 2025-01-16 NOTE — PROGRESS NOTES
"[facility]  Physical Therapy Treatment Note     Name: Caridad Hinds  Clinic Number: [id number]    Therapy Diagnosis:   Encounter Diagnoses   Name Primary?    Decreased range of motion of trunk and back Yes    Decreased strength of trunk and back        Physician: Lorena Beatty NP    Visit Date: 1/16/2025    Physician Orders: PT Eval and Treat  Medical Diagnosis from Referral: M51.36 (ICD-10-CM) - DDD (degenerative disc disease), lumbar; M48.062 (ICD-10-CM) - Spinal stenosis of lumbar region with neurogenic claudication; M54.16 (ICD-10-CM) - Lumbar radiculopathy  Evaluation Date: 10/15/2024  Authorization Period Expiration: 06/25/2025  Plan of Care Expiration: 2/20/2025  Reassessment Due: 2/10/2024  Visit # / Visits authorized: 15/20    MedX testing visit 1    PTA Visit #: 2/5     Time In: 11:00 am  Time Out: 12:00  pm  Total Billable Time: 55 minutes  INSURANCE and OUTCOMES: Fee for Service with FOTO Outcomes 1/3     Precautions: standard     Pattern of pain determined: PEN    Subjective     Caridad Hinds reports overall improvements with therapy and denies pain today. She reports experiencing increase R shld and R knee pain yesterday due to the cold weather, managing symptoms with lidocaine patches.    Patient reports tolerating previous visit fair  Patient reports their pain to be 0/10 on a 0-10 scale with 0 being no pain and 10 being the worst pain imaginable.  Pain Location: R > L low back, radiating symptoms into yke buttocks      Occupation: retired  Leisure: gardening, reading, treadmill 3x/week, gym      Pts goals: "be able to get back to the gym and aerobics"     Objective      Lumbar  Isometric Testing on Med X equipment: Testing administered by PT    Test Initial Baseline Midpoint Final   Date 10/17/2024 12/10/2024    ROM 0-48 deg 0-51    Max Peak Torque 71  59    Min Peak Torque 5  15    Flex/Ext Ratio 14.2:1 3.9:1    % variance  normative data 52 59    % change " from initial test N/A visit 1 +22      Starting weight 30     OUTCOMES SELECTION:   Intake Outcome Measure for FOTO Lumbar Survey     Therapist reviewed FOTO scores for Caridad Hinds on 10/15/2024.   FOTO documents entered into [email or web address] - see Media section.     Intake Score: 47% functional ability (SHASHANK: 20)   11/06/2024: 57%  12/11/2024: 59%  Goal Score: 62% functional ability (SHASHANK: 10)            Treatment     Caridad received the treatments listed below:      Medical MedX Treatment as follows:  MedX testing performed day 2: Patient  received neuromuscular education to engage spinal musculature correctly for motor control and engagement of musculature for 0 minutes including the MedX exercise component and practice and standard testing. MedX dynamic exercise and baseline isometric test performed with instructions to guide the patient safely through the testing procedure. Patient instructed to perform isometric test correctly and safely while building to an optimal force with a pain-free effort. Patient also instructed that they should feel support/pressure from MedX restraints but no pain/discomfort, and encouraged to report any pain to therapist. Patient demonstrated appropriate understanding of information and tolerance of test.  Education regarding purpose of test, safety during test given, and reviewed possible more soreness and strategies.              1/16/2025    11:37 AM   [facility]   Visit Number 15   VAS Pain Rating 0   Time 5   Flexion in Lying 10   Lumbar Weight 44 lbs   Repetitions 15   Rating of Perceived Exertion 3   Ice - Z Lie (in min.) 0                   Caridad participated in neuromuscular re-education activities to improve balance, coordination, proprioception, motor control and/or posture for 00 minutes. The following activities were included:        Caridad participated in therapeutic exercises to develop strength, endurance, ROM, flexibility, posture, and core stabilization for  55  minutes  "including:                  LTR, x10x5"   Hamstring stretch, x2x30" -np  Bridge, x15x3"  DKTC, x10"x3 with ball  Piriformis, x2x20"   PPT, x10x5"-np  PPT to 90/90 holds 2x30"  90/90 heel taps 2x10  BKFO c/ PPT GTB 3 sec x10  TA contraction + SLR c/ PB x12  Paloff press x10 GTB  SOC x10         Peripheral muscle strengthening which included one set of 15-20 repetitions at a slow and controlled 10-13 second per rep pace focused on strengthening supporting musculature in order to improve body mechanics and functional mobility. Patient and therapist focused on proper form during treatment to ensure optimal strengthening of each targeted muscle group.  Machines utilized included:Torso rotation, Leg Ext, Leg Curl, Chest Press, and Rowing  Triceps, Biceps, Hip Abd, and Leg Press      Caridad participated in dynamic functional therapeutic activities to improve functional performance and simulate household and community activities for 0 minutes. The following activities were included:    Lifting mechanics and education  Proper lifting techniques  Neutral spine with hip hinge and knee with   20x    Sit to stand 10x      Lifting demonstration with crate and load transfer from floor to mat with 5x with lumbar muscular fatigue              Caridad received manual therapy techniques for 00  minutes. The following activities were included:      Pt given HEAT pack for 5 minutes to low back in sitting.    Patient Education and Home Exercises     Home exercises include:  LTR, hamstring stretch, bridge, SKTC, piriformis, PPT   Cardio program (V5): - 1/16/2025  Lifting education (V11): - 12/17/2024  Posture/Lumbar roll: TBD  Fridge Magnet Discharge handout (date given): -  Equipment at home/gym membership: no, stopped 3 months ago d/t pain       Education provided:   - Patient received education in benefits of progressing mobility and strengthening gradually  - Pt educated in proper body mechanics and lifting techniques.  - Discussed " "exertion scale, slow progressive resistance protocol to promote safe and healthy strengthening of supportive musculature  by performing 15-20 reps, 7 sec per rep, and increasing weight by 5 % at 20 reps only if there ex done safely, slowly, using correct musculature, exertion and no pain.  Patient expressed understanding.  -Pt educated on strategies to safely perform examination and exercise using "Stop Light" analogy to describe how to avoid or stop irritating motions, proceed with caution with some movements, and progress positive exercises.     Written Home Exercises Provided: Patient instructed to cont prior HEP.  Exercises were reviewed and Caridad was able to demonstrate them prior to the end of the session.  Caridad demonstrated good  understanding of the education provided.     See EMR under Patient Instructions for exercises provided prior visit.    Assessment     Caridad presents to therapy with reports of overall improvements despite R shld and R knee pain. She was able to perform therex w/o adverse effects, requiring min cues throughout session to improved activation of the lumbar extensors with proper execution. Resistance increased  on the lumbar medx to 44 ft lbs, she was able to complete 15 repetitions at 2/10 RPE and plan to progress as tolerated. Will continue with full peripheral strengthening and continue to progress functional strengthening.     Patient is making good progress towards established goals.  Pt will continue to benefit from skilled outpatient physical therapy to address the deficits stated in the impairment chart, provide pt/family education and to maximize pt's level of independence in the home and community environment.     Anticipated Barriers for therapy: chronicity of symptoms   Pt's spiritual, cultural and educational needs considered and pt agreeable to plan of care and goals as stated below:     GOALS: Pt is in agreement with the following goals.     Short term goals:  6 weeks or " 10 visits   - Pt will demonstrate increased lumbar MedX ROM by at least 0-5 degrees from the initial ROM value with improvements noted in functional ROM and ability to perform ADLs. Met 12/10/2024  - Pt will demonstrate increased MedX average isometric strength value by 5-10% from initial test resulting in improved ability to perform bending, lifting, and carrying activities safely, confidently. Met 12/10/2024  - Pt will report a reduction in worst pain score by 1-2 points for improved tolerance for standing for 15 minutes or more. Partially Met  - Pt able to perform HEP correctly with minimal cueing or supervision from therapist to encourage independent management of symptoms. Met 12/10/2024     Long term goals: 10 weeks or 20 visits   - Pt will demonstrate increased lumbar MedX ROM by at least 5-10 degrees from initial ROM value, resulting in improved ability to perform functional forward bending while standing and sitting. Appropriate and Ongoing  - Pt will demonstrate increased MedX average isometric strength value by 50-75% from initial test resulting in improved ability to perform bending, lifting, and carrying activities safely and confidently. Appropriate and Ongoing  - Pt to demonstrate ability to independently control and reduce their pain through posture positioning and mechanical movements throughout a typical day. Appropriate and Ongoing  - Pt will demonstrate reduced pain and improved functional outcomes as reported on the FOTO by reaching an intake score of >/= 62% functional ability in order to demonstrate subjective improvement in patient's condition. . Appropriate and Ongoing  - Pt will demonstrate independence with the HEP at discharge. Appropriate and Ongoing  - Patient will be able to complete forward bending while gardening with minimal to no complications indicating improved tolerance to activity. Appropriate and Ongoing       Plan     Continue with established Plan of Care towards established  PT goals.     Therapist: Ana Olivares, PTA  1/16/2025

## 2025-01-28 ENCOUNTER — CLINICAL SUPPORT (OUTPATIENT)
Dept: REHABILITATION | Facility: OTHER | Age: 80
End: 2025-01-28
Payer: MEDICARE

## 2025-01-28 DIAGNOSIS — M25.69 DECREASED RANGE OF MOTION OF TRUNK AND BACK: Primary | ICD-10-CM

## 2025-01-28 DIAGNOSIS — R29.898 DECREASED STRENGTH OF TRUNK AND BACK: ICD-10-CM

## 2025-01-28 PROCEDURE — 97110 THERAPEUTIC EXERCISES: CPT

## 2025-01-28 NOTE — PROGRESS NOTES
"[facility]  Physical Therapy Treatment Note     Name: Caridad Hinds  Clinic Number: [id number]    Therapy Diagnosis:   No diagnosis found.      Physician: Lorena Beatty NP    Visit Date: 1/28/2025    Physician Orders: PT Eval and Treat  Medical Diagnosis from Referral: M51.36 (ICD-10-CM) - DDD (degenerative disc disease), lumbar; M48.062 (ICD-10-CM) - Spinal stenosis of lumbar region with neurogenic claudication; M54.16 (ICD-10-CM) - Lumbar radiculopathy  Evaluation Date: 10/15/2024  Authorization Period Expiration: 06/25/2025  Plan of Care Expiration: 2/20/2025  Reassessment Due: 2/10/2024  Visit # / Visits authorized: 15/20    MedX testing visit 1    PTA Visit #: 2/5     Time In: 11:00 am  Time Out: 12:00  pm  Total Billable Time: 55 minutes  INSURANCE and OUTCOMES: Fee for Service with FOTO Outcomes 1/3     Precautions: standard     Pattern of pain determined: PEN    Subjective     Caridad Hinds reports overall improvements with therapy and denies pain today. She reports experiencing increase R shld and R knee pain yesterday due to the cold weather, managing symptoms with lidocaine patches.    Patient reports tolerating previous visit fair  Patient reports their pain to be 0/10 on a 0-10 scale with 0 being no pain and 10 being the worst pain imaginable.  Pain Location: R > L low back, radiating symptoms into kye buttocks      Occupation: retired  Leisure: gardening, reading, treadmill 3x/week, gym      Pts goals: "be able to get back to the gym and aerobics"     Objective      Lumbar  Isometric Testing on Med X equipment: Testing administered by PT    Test Initial Baseline Midpoint Final   Date 10/17/2024 12/10/2024    ROM 0-48 deg 0-51    Max Peak Torque 71  59    Min Peak Torque 5  15    Flex/Ext Ratio 14.2:1 3.9:1    % variance  normative data 52 59    % change from initial test N/A visit 1 +22      Starting weight 30     OUTCOMES SELECTION:   Intake Outcome Measure " "for FOTO Lumbar Survey     Therapist reviewed FOTO scores for Caridad Hinds on 10/15/2024.   FOTO documents entered into [employer] - see Media section.     Intake Score: 47% functional ability (SHASHANK: 20)   11/06/2024: 57%  12/11/2024: 59%  Goal Score: 62% functional ability (SHASHANK: 10)            Treatment     Caridad received the treatments listed below:      Medical MedX Treatment as follows:  MedX testing performed day 2: Patient  received neuromuscular education to engage spinal musculature correctly for motor control and engagement of musculature for 0 minutes including the MedX exercise component and practice and standard testing. MedX dynamic exercise and baseline isometric test performed with instructions to guide the patient safely through the testing procedure. Patient instructed to perform isometric test correctly and safely while building to an optimal force with a pain-free effort. Patient also instructed that they should feel support/pressure from MedX restraints but no pain/discomfort, and encouraged to report any pain to therapist. Patient demonstrated appropriate understanding of information and tolerance of test.  Education regarding purpose of test, safety during test given, and reviewed possible more soreness and strategies.              1/16/2025    11:37 AM   [facility]   Visit Number 15   VAS Pain Rating 0   Time 5   Flexion in Lying 10   Lumbar Weight 44 lbs   Repetitions 15   Rating of Perceived Exertion 3   Ice - Z Lie (in min.) 0                   Caridad participated in neuromuscular re-education activities to improve balance, coordination, proprioception, motor control and/or posture for 00 minutes. The following activities were included:        Caridad participated in therapeutic exercises to develop strength, endurance, ROM, flexibility, posture, and core stabilization for  55  minutes including:                  LTR, x10x5"   Hamstring stretch, x2x30" -np  Bridge, x15x3"  DKTC, x10"x3 with " "ball  Piriformis, x2x20"   PPT, x10x5"-np  PPT to 90/90 holds 2x30"  90/90 heel taps 2x10  BKFO c/ PPT GTB 3 sec x10  TA contraction + SLR c/ PB x12  Paloff press x10 GTB  SOC x10         Peripheral muscle strengthening which included one set of 15-20 repetitions at a slow and controlled 10-13 second per rep pace focused on strengthening supporting musculature in order to improve body mechanics and functional mobility. Patient and therapist focused on proper form during treatment to ensure optimal strengthening of each targeted muscle group.  Machines utilized included:Torso rotation, Leg Ext, Leg Curl, Chest Press, and Rowing  Triceps, Biceps, Hip Abd, and Leg Press      Caridad participated in dynamic functional therapeutic activities to improve functional performance and simulate household and community activities for 0 minutes. The following activities were included:    Lifting mechanics and education  Proper lifting techniques  Neutral spine with hip hinge and knee with   20x    Sit to stand 10x      Lifting demonstration with crate and load transfer from floor to mat with 5x with lumbar muscular fatigue              Caridad received manual therapy techniques for 00  minutes. The following activities were included:      Pt given HEAT pack for 5 minutes to low back in sitting.    Patient Education and Home Exercises     Home exercises include:  LTR, hamstring stretch, bridge, SKTC, piriformis, PPT   Cardio program (V5): - 1/28/2025  Lifting education (V11): - 12/17/2024  Posture/Lumbar roll: TBD  Fridge Magnet Discharge handout (date given): -  Equipment at home/gym membership: no, stopped 3 months ago d/t pain       Education provided:   - Patient received education in benefits of progressing mobility and strengthening gradually  - Pt educated in proper body mechanics and lifting techniques.  - Discussed exertion scale, slow progressive resistance protocol to promote safe and healthy strengthening of supportive " "musculature  by performing 15-20 reps, 7 sec per rep, and increasing weight by 5 % at 20 reps only if there ex done safely, slowly, using correct musculature, exertion and no pain.  Patient expressed understanding.  -Pt educated on strategies to safely perform examination and exercise using "Stop Light" analogy to describe how to avoid or stop irritating motions, proceed with caution with some movements, and progress positive exercises.     Written Home Exercises Provided: Patient instructed to cont prior HEP.  Exercises were reviewed and Caridad was able to demonstrate them prior to the end of the session.  Caridad demonstrated good  understanding of the education provided.     See EMR under Patient Instructions for exercises provided prior visit.    Assessment     Caridad presents to therapy with reports of overall improvements despite R shld and R knee pain. She was able to perform therex w/o adverse effects, requiring min cues throughout session to improved activation of the lumbar extensors with proper execution. Resistance increased  on the lumbar medx to 44 ft lbs, she was able to complete 15 repetitions at 2/10 RPE and plan to progress as tolerated. Will continue with full peripheral strengthening and continue to progress functional strengthening.     Patient is making good progress towards established goals.  Pt will continue to benefit from skilled outpatient physical therapy to address the deficits stated in the impairment chart, provide pt/family education and to maximize pt's level of independence in the home and community environment.     Anticipated Barriers for therapy: chronicity of symptoms   Pt's spiritual, cultural and educational needs considered and pt agreeable to plan of care and goals as stated below:     GOALS: Pt is in agreement with the following goals.     Short term goals:  6 weeks or 10 visits   - Pt will demonstrate increased lumbar MedX ROM by at least 0-5 degrees from the initial ROM " value with improvements noted in functional ROM and ability to perform ADLs. Met 12/10/2024  - Pt will demonstrate increased MedX average isometric strength value by 5-10% from initial test resulting in improved ability to perform bending, lifting, and carrying activities safely, confidently. Met 12/10/2024  - Pt will report a reduction in worst pain score by 1-2 points for improved tolerance for standing for 15 minutes or more. Partially Met  - Pt able to perform HEP correctly with minimal cueing or supervision from therapist to encourage independent management of symptoms. Met 12/10/2024     Long term goals: 10 weeks or 20 visits   - Pt will demonstrate increased lumbar MedX ROM by at least 5-10 degrees from initial ROM value, resulting in improved ability to perform functional forward bending while standing and sitting. Appropriate and Ongoing  - Pt will demonstrate increased MedX average isometric strength value by 50-75% from initial test resulting in improved ability to perform bending, lifting, and carrying activities safely and confidently. Appropriate and Ongoing  - Pt to demonstrate ability to independently control and reduce their pain through posture positioning and mechanical movements throughout a typical day. Appropriate and Ongoing  - Pt will demonstrate reduced pain and improved functional outcomes as reported on the FOTO by reaching an intake score of >/= 62% functional ability in order to demonstrate subjective improvement in patient's condition. . Appropriate and Ongoing  - Pt will demonstrate independence with the HEP at discharge. Appropriate and Ongoing  - Patient will be able to complete forward bending while gardening with minimal to no complications indicating improved tolerance to activity. Appropriate and Ongoing       Plan     Continue with established Plan of Care towards established PT goals.     Therapist: Anna Smith, PTA  1/28/2025

## 2025-01-28 NOTE — PROGRESS NOTES
"[facility]  Physical Therapy Treatment Note     Name: Caridad Hinds  Clinic Number: [id number]    Therapy Diagnosis:   Encounter Diagnoses   Name Primary?    Decreased range of motion of trunk and back Yes    Decreased strength of trunk and back          Physician: Lorena Beatty NP    Visit Date: 1/28/2025    Physician Orders: PT Eval and Treat  Medical Diagnosis from Referral: M51.36 (ICD-10-CM) - DDD (degenerative disc disease), lumbar; M48.062 (ICD-10-CM) - Spinal stenosis of lumbar region with neurogenic claudication; M54.16 (ICD-10-CM) - Lumbar radiculopathy  Evaluation Date: 10/15/2024  Authorization Period Expiration: 06/25/2025  Plan of Care Expiration: 2/20/2025  Reassessment Due: 2/10/2024  Visit # / Visits authorized: 16/20    MedX testing visit 1    PTA Visit #: 0/5     Time In: 1:00 pm  Time Out: 2:00 pm  Total Billable Time: 55 minutes  INSURANCE and OUTCOMES: Fee for Service with FOTO Outcomes 1/3     Precautions: standard     Pattern of pain determined: PEN    Subjective     Caridad Hinds reports overall improvements with therapy and denies pain today. She has been having more pain with the knee.    Patient reports tolerating previous visit fair  Patient reports their pain to be 0/10 on a 0-10 scale with 0 being no pain and 10 being the worst pain imaginable.  Pain Location: R > L low back, radiating symptoms into kye buttocks      Occupation: retired  Leisure: gardening, reading, treadmill 3x/week, gym      Pts goals: "be able to get back to the gym and aerobics"     Objective      Lumbar  Isometric Testing on Med X equipment: Testing administered by PT    Test Initial Baseline Midpoint Final   Date 10/17/2024 12/10/2024    ROM 0-48 deg 0-51    Max Peak Torque 71  59    Min Peak Torque 5  15    Flex/Ext Ratio 14.2:1 3.9:1    % variance  normative data 52 59    % change from initial test N/A visit 1 +22      Starting weight 30     OUTCOMES SELECTION:   Intake " "Outcome Measure for FOTO Lumbar Survey     Therapist reviewed FOTO scores for Caridad Hinds on 10/15/2024.   FOTO documents entered into [employer] - see Media section.     Intake Score: 47% functional ability (SHASHANK: 20)   11/06/2024: 57%  12/11/2024: 59%  Goal Score: 62% functional ability (SHASHANK: 10)            Treatment     Caridad received the treatments listed below:      Medical MedX Treatment as follows:  MedX testing performed day 2: Patient  received neuromuscular education to engage spinal musculature correctly for motor control and engagement of musculature for 0 minutes including the MedX exercise component and practice and standard testing. MedX dynamic exercise and baseline isometric test performed with instructions to guide the patient safely through the testing procedure. Patient instructed to perform isometric test correctly and safely while building to an optimal force with a pain-free effort. Patient also instructed that they should feel support/pressure from MedX restraints but no pain/discomfort, and encouraged to report any pain to therapist. Patient demonstrated appropriate understanding of information and tolerance of test.  Education regarding purpose of test, safety during test given, and reviewed possible more soreness and strategies.              1/28/2025     1:31 PM   [facility]   Visit Number 16   VAS Pain Rating 0   Time 5   Flexion in Lying 15   Lumbar Flexion 57   Lumbar Extension 0   Lumbar Weight 44 lbs   Repetitions 18   Rating of Perceived Exertion 3   Ice - Z Lie (in min.) 0                  Caridad participated in neuromuscular re-education activities to improve balance, coordination, proprioception, motor control and/or posture for 00 minutes. The following activities were included:        Caridad participated in therapeutic exercises to develop strength, endurance, ROM, flexibility, posture, and core stabilization for  55  minutes including:             LTR, x10x5"   Hamstring " "stretch, x2x30" -np  Bridge, x15x3" RTB  DKTC, x10"x3 with ball   Piriformis, x2x20"   PPT, x10x5"-np  PPT to 90/90 holds 2x30"  90/90 heel taps x15  BKFO c/ PPT GTB 3 sec x10  TA contraction + SLR c/ PB x12  Paloff press x10 GTB  SOC x10   Side lying Clams RTB x10        Peripheral muscle strengthening which included one set of 15-20 repetitions at a slow and controlled 10-13 second per rep pace focused on strengthening supporting musculature in order to improve body mechanics and functional mobility. Patient and therapist focused on proper form during treatment to ensure optimal strengthening of each targeted muscle group.  Machines utilized included:Torso rotation, Leg Ext, Leg Curl, Chest Press, and Rowing  Triceps, Biceps, Hip Abd, and Leg Press      Caridad participated in dynamic functional therapeutic activities to improve functional performance and simulate household and community activities for 0 minutes. The following activities were included:    Lifting mechanics and education  Proper lifting techniques  Neutral spine with hip hinge and knee with   20x    Sit to stand 10x      Lifting demonstration with crate and load transfer from floor to mat with 5x with lumbar muscular fatigue              Caridad received manual therapy techniques for 00  minutes. The following activities were included:      Pt given HEAT pack for 5 minutes to low back in sitting.    Patient Education and Home Exercises     Home exercises include:  LTR, hamstring stretch, bridge, SKTC, piriformis, PPT   Cardio program (V5): - 11/28/2025  Lifting education (V11): - 12/17/2024  Posture/Lumbar roll: TBD  [employer] Discharge handout (date given): -  Equipment at home/gym membership: no, stopped 3 months ago d/t pain       Education provided:   - Patient received education in benefits of progressing mobility and strengthening gradually  - Pt educated in proper body mechanics and lifting techniques.  - Discussed exertion scale, slow " "progressive resistance protocol to promote safe and healthy strengthening of supportive musculature  by performing 15-20 reps, 7 sec per rep, and increasing weight by 5 % at 20 reps only if there ex done safely, slowly, using correct musculature, exertion and no pain.  Patient expressed understanding.  -Pt educated on strategies to safely perform examination and exercise using "Stop Light" analogy to describe how to avoid or stop irritating motions, proceed with caution with some movements, and progress positive exercises.     Written Home Exercises Provided: Patient instructed to cont prior HEP.  Exercises were reviewed and Caridad was able to demonstrate them prior to the end of the session.  Caridad demonstrated good  understanding of the education provided.     See EMR under Patient Instructions for exercises provided prior visit.    Assessment     Caridad presents to therapy with reports of overall improvements despite R shld and R knee pain. She was able to perform therex w/o adverse effects, requiring min cues throughout session to improved activation of the lumbar extensors with proper execution. Resistance increased  on the lumbar medx to 44 ft lbs, she was able to complete 18 repetitions at 2/10 RPE and plan to progress as tolerated. Will continue with full peripheral strengthening and continue to progress functional strengthening.     Patient is making good progress towards established goals.  Pt will continue to benefit from skilled outpatient physical therapy to address the deficits stated in the impairment chart, provide pt/family education and to maximize pt's level of independence in the home and community environment.     Anticipated Barriers for therapy: chronicity of symptoms   Pt's spiritual, cultural and educational needs considered and pt agreeable to plan of care and goals as stated below:     GOALS: Pt is in agreement with the following goals.     Short term goals:  6 weeks or 10 visits   - Pt will " demonstrate increased lumbar MedX ROM by at least 0-5 degrees from the initial ROM value with improvements noted in functional ROM and ability to perform ADLs. Met 12/10/2024  - Pt will demonstrate increased MedX average isometric strength value by 5-10% from initial test resulting in improved ability to perform bending, lifting, and carrying activities safely, confidently. Met 12/10/2024  - Pt will report a reduction in worst pain score by 1-2 points for improved tolerance for standing for 15 minutes or more. Partially Met  - Pt able to perform HEP correctly with minimal cueing or supervision from therapist to encourage independent management of symptoms. Met 12/10/2024     Long term goals: 10 weeks or 20 visits   - Pt will demonstrate increased lumbar MedX ROM by at least 5-10 degrees from initial ROM value, resulting in improved ability to perform functional forward bending while standing and sitting. Appropriate and Ongoing  - Pt will demonstrate increased MedX average isometric strength value by 50-75% from initial test resulting in improved ability to perform bending, lifting, and carrying activities safely and confidently. Appropriate and Ongoing  - Pt to demonstrate ability to independently control and reduce their pain through posture positioning and mechanical movements throughout a typical day. Appropriate and Ongoing  - Pt will demonstrate reduced pain and improved functional outcomes as reported on the FOTO by reaching an intake score of >/= 62% functional ability in order to demonstrate subjective improvement in patient's condition. . Appropriate and Ongoing  - Pt will demonstrate independence with the HEP at discharge. Appropriate and Ongoing  - Patient will be able to complete forward bending while gardening with minimal to no complications indicating improved tolerance to activity. Appropriate and Ongoing       Plan     Continue with established Plan of Care towards established PT goals.      Therapist: Mk Jose, PT  1/28/2025

## 2025-01-30 ENCOUNTER — CLINICAL SUPPORT (OUTPATIENT)
Dept: REHABILITATION | Facility: OTHER | Age: 80
End: 2025-01-30
Payer: MEDICARE

## 2025-01-30 DIAGNOSIS — R29.898 DECREASED STRENGTH OF TRUNK AND BACK: ICD-10-CM

## 2025-01-30 DIAGNOSIS — M25.69 DECREASED RANGE OF MOTION OF TRUNK AND BACK: Primary | ICD-10-CM

## 2025-01-30 PROCEDURE — 97110 THERAPEUTIC EXERCISES: CPT | Mod: CQ

## 2025-01-30 NOTE — PROGRESS NOTES
"[facility]  Physical Therapy Treatment Note     Name: Caridad Hinds  Clinic Number: [id number]    Therapy Diagnosis:   Encounter Diagnoses   Name Primary?    Decreased range of motion of trunk and back Yes    Decreased strength of trunk and back          Physician: Lorena Beatty NP    Visit Date: 1/30/2025    Physician Orders: PT Eval and Treat  Medical Diagnosis from Referral: M51.36 (ICD-10-CM) - DDD (degenerative disc disease), lumbar; M48.062 (ICD-10-CM) - Spinal stenosis of lumbar region with neurogenic claudication; M54.16 (ICD-10-CM) - Lumbar radiculopathy  Evaluation Date: 10/15/2024  Authorization Period Expiration: 06/25/2025  Plan of Care Expiration: 2/20/2025  Reassessment Due: 2/10/2024  Visit # / Visits authorized: 16/20    MedX testing visit 1    PTA Visit #: 1/5     Time In: 11:00 am  Time Out: 12:00 pm  Total Billable Time: 55 minutes  INSURANCE and OUTCOMES: Fee for Service with FOTO Outcomes 1/3     Precautions: standard     Pattern of pain determined: PEN    Subjective     Caridad Hinds reports overall improved lumbar symptoms with therapy. She has been having more pain in the lateral region of her R knee and intermittently in her R lateral foot.     Patient reports tolerating previous visit fair  Patient reports their pain to be 0/10 on a 0-10 scale with 0 being no pain and 10 being the worst pain imaginable.  Pain Location: R > L low back, radiating symptoms into kye buttocks      Occupation: retired  Leisure: gardening, reading, treadmill 3x/week, gym      Pts goals: "be able to get back to the gym and aerobics"     Objective      Lumbar  Isometric Testing on Med X equipment: Testing administered by PT    Test Initial Baseline Midpoint Final   Date 10/17/2024 12/10/2024    ROM 0-48 deg 0-51    Max Peak Torque 71  59    Min Peak Torque 5  15    Flex/Ext Ratio 14.2:1 3.9:1    % variance  normative data 52 59    % change from initial test N/A visit 1 +22  "     Starting weight 30     OUTCOMES SELECTION:   Intake Outcome Measure for FOTO Lumbar Survey     Therapist reviewed FOTO scores for Caridad Hinds on 10/15/2024.   FOTO documents entered into [employer] - see Media section.     Intake Score: 47% functional ability (SHASHANK: 20)   2024: 57%  2024: 59%  Goal Score: 62% functional ability (SHASHANK: 10)            Treatment     Caridad received the treatments listed below:      Medical MedX Treatment as follows:  MedX testing performed day 2: Patient  received neuromuscular education to engage spinal musculature correctly for motor control and engagement of musculature for 0 minutes including the MedX exercise component and practice and standard testing. MedX dynamic exercise and baseline isometric test performed with instructions to guide the patient safely through the testing procedure. Patient instructed to perform isometric test correctly and safely while building to an optimal force with a pain-free effort. Patient also instructed that they should feel support/pressure from MedX restraints but no pain/discomfort, and encouraged to report any pain to therapist. Patient demonstrated appropriate understanding of information and tolerance of test.  Education regarding purpose of test, safety during test given, and reviewed possible more soreness and strategies.                         Caridad participated in neuromuscular re-education activities to improve balance, coordination, proprioception, motor control and/or posture for 00 minutes. The following activities were included:        Caridad participated in therapeutic exercises to develop strength, endurance, ROM, flexibility, posture, and core stabilization for  55  minutes includin/30/2025    10:53 AM   [facility]   Visit Number 17   VAS Pain Rating 0   Recumbent Bike Seat Pos. 5   Flexion in Lying 10   Lumbar Weight 44 lbs   Repetitions 20   Rating of Perceived Exertion 3   Ice - Z Lie (in min.) 0  "              LTR, x10x5"   Hamstring stretch, x2x30" -np  Bridge, x15x3" RTB  DKTC, x10"x3 with ball   Piriformis, x2x20"   PPT, x10x5"-np  PPT to 90/90 holds 2x30"-np  90/90 heel taps x15  BKFO c/ PPT GTB 3 sec x10-np  TA contraction + SLR c/ PB x12-np  Paloff press x10 GTB  SOC x10-np  Side lying Clams RTB x10        Peripheral muscle strengthening which included one set of 15-20 repetitions at a slow and controlled 10-13 second per rep pace focused on strengthening supporting musculature in order to improve body mechanics and functional mobility. Patient and therapist focused on proper form during treatment to ensure optimal strengthening of each targeted muscle group.  Machines utilized included:Torso rotation, Leg Ext, Leg Curl, Chest Press, and Rowing  Triceps, Biceps, Hip Abd, and Leg Press      Caridad participated in dynamic functional therapeutic activities to improve functional performance and simulate household and community activities for 0 minutes. The following activities were included:    Lifting mechanics and education  Proper lifting techniques  Neutral spine with hip hinge and knee with   20x    Sit to stand 10x      Lifting demonstration with crate and load transfer from floor to mat with 5x with lumbar muscular fatigue              Caridad received manual therapy techniques for 00  minutes. The following activities were included:      Pt given HEAT pack for 5 minutes to low back in sitting.    Patient Education and Home Exercises     Home exercises include:  LTR, hamstring stretch, bridge, SKTC, piriformis, PPT   Cardio program (V5): - 11/28/2025  Lifting education (V11): - 12/17/2024  Posture/Lumbar roll: TBD  [employer] Discharge handout (date given): -  Equipment at home/gym membership: no, stopped 3 months ago d/t pain       Education provided:   - Patient received education in benefits of progressing mobility and strengthening gradually  - Pt educated in proper body mechanics and lifting " "techniques.  - Discussed exertion scale, slow progressive resistance protocol to promote safe and healthy strengthening of supportive musculature  by performing 15-20 reps, 7 sec per rep, and increasing weight by 5 % at 20 reps only if there ex done safely, slowly, using correct musculature, exertion and no pain.  Patient expressed understanding.  -Pt educated on strategies to safely perform examination and exercise using "Stop Light" analogy to describe how to avoid or stop irritating motions, proceed with caution with some movements, and progress positive exercises.     Written Home Exercises Provided: Patient instructed to cont prior HEP.  Exercises were reviewed and Caridad was able to demonstrate them prior to the end of the session.  Caridad demonstrated good  understanding of the education provided.     See EMR under Patient Instructions for exercises provided prior visit.    Assessment     Caridad presents to therapy with reports of overall improvements despite c/o R knee pain. She was able to perform therex w/o adverse effects. Resistance maintained on the lumbar medx to 44 ft lbs, she was able to complete 20 repetitions at 3/10 RPE and plan to progress as tolerated. Will continue with full peripheral strengthening and continue to progress functional strengthening.     Patient is making good progress towards established goals.  Pt will continue to benefit from skilled outpatient physical therapy to address the deficits stated in the impairment chart, provide pt/family education and to maximize pt's level of independence in the home and community environment.     Anticipated Barriers for therapy: chronicity of symptoms   Pt's spiritual, cultural and educational needs considered and pt agreeable to plan of care and goals as stated below:     GOALS: Pt is in agreement with the following goals.     Short term goals:  6 weeks or 10 visits   - Pt will demonstrate increased lumbar MedX ROM by at least 0-5 degrees from " the initial ROM value with improvements noted in functional ROM and ability to perform ADLs. Met 12/10/2024  - Pt will demonstrate increased MedX average isometric strength value by 5-10% from initial test resulting in improved ability to perform bending, lifting, and carrying activities safely, confidently. Met 12/10/2024  - Pt will report a reduction in worst pain score by 1-2 points for improved tolerance for standing for 15 minutes or more. Partially Met  - Pt able to perform HEP correctly with minimal cueing or supervision from therapist to encourage independent management of symptoms. Met 12/10/2024     Long term goals: 10 weeks or 20 visits   - Pt will demonstrate increased lumbar MedX ROM by at least 5-10 degrees from initial ROM value, resulting in improved ability to perform functional forward bending while standing and sitting. Appropriate and Ongoing  - Pt will demonstrate increased MedX average isometric strength value by 50-75% from initial test resulting in improved ability to perform bending, lifting, and carrying activities safely and confidently. Appropriate and Ongoing  - Pt to demonstrate ability to independently control and reduce their pain through posture positioning and mechanical movements throughout a typical day. Appropriate and Ongoing  - Pt will demonstrate reduced pain and improved functional outcomes as reported on the FOTO by reaching an intake score of >/= 62% functional ability in order to demonstrate subjective improvement in patient's condition. . Appropriate and Ongoing  - Pt will demonstrate independence with the HEP at discharge. Appropriate and Ongoing  - Patient will be able to complete forward bending while gardening with minimal to no complications indicating improved tolerance to activity. Appropriate and Ongoing       Plan     Continue with established Plan of Care towards established PT goals.     Therapist: Anna Smith, PTA  1/30/2025

## 2025-02-04 ENCOUNTER — CLINICAL SUPPORT (OUTPATIENT)
Dept: REHABILITATION | Facility: OTHER | Age: 80
End: 2025-02-04
Payer: MEDICARE

## 2025-02-04 DIAGNOSIS — R29.898 DECREASED STRENGTH OF TRUNK AND BACK: ICD-10-CM

## 2025-02-04 DIAGNOSIS — M25.69 DECREASED RANGE OF MOTION OF TRUNK AND BACK: Primary | ICD-10-CM

## 2025-02-04 PROCEDURE — 97110 THERAPEUTIC EXERCISES: CPT | Mod: CQ

## 2025-02-06 ENCOUNTER — CLINICAL SUPPORT (OUTPATIENT)
Dept: REHABILITATION | Facility: OTHER | Age: 80
End: 2025-02-06
Payer: MEDICARE

## 2025-02-06 DIAGNOSIS — R29.898 DECREASED STRENGTH OF TRUNK AND BACK: ICD-10-CM

## 2025-02-06 DIAGNOSIS — M25.69 DECREASED RANGE OF MOTION OF TRUNK AND BACK: Primary | ICD-10-CM

## 2025-02-06 PROCEDURE — 97110 THERAPEUTIC EXERCISES: CPT

## 2025-02-06 NOTE — PROGRESS NOTES
"[facility]  Physical Therapy Treatment Note     Name: Caridad Hinds  Clinic Number: [id number]    Therapy Diagnosis:   Encounter Diagnoses   Name Primary?    Decreased range of motion of trunk and back Yes    Decreased strength of trunk and back          Physician: Lorena Beatty NP    Visit Date: 2/4/2025    Physician Orders: PT Eval and Treat  Medical Diagnosis from Referral: M51.36 (ICD-10-CM) - DDD (degenerative disc disease), lumbar; M48.062 (ICD-10-CM) - Spinal stenosis of lumbar region with neurogenic claudication; M54.16 (ICD-10-CM) - Lumbar radiculopathy  Evaluation Date: 10/15/2024  Authorization Period Expiration: 06/25/2025  Plan of Care Expiration: 2/20/2025  Reassessment Due: 2/10/2024  Visit # / Visits authorized: 16/20    MedX testing visit 1    PTA Visit #: 1/5     Time In: 11:00 am  Time Out: 12:00 pm  Total Billable Time: 55 minutes  INSURANCE and OUTCOMES: Fee for Service with FOTO Outcomes 1/3     Precautions: standard     Pattern of pain determined: PEN    Subjective     Caridad Hinds reports overall improved lumbar symptoms with therapy. She has been having more pain in the lateral region of her R knee and intermittently in her R lateral foot.     Patient reports tolerating previous visit fair  Patient reports their pain to be 0/10 on a 0-10 scale with 0 being no pain and 10 being the worst pain imaginable.  Pain Location: R > L low back, radiating symptoms into kye buttocks      Occupation: retired  Leisure: gardening, reading, treadmill 3x/week, gym      Pts goals: "be able to get back to the gym and aerobics"     Objective      Lumbar  Isometric Testing on Med X equipment: Testing administered by PT    Test Initial Baseline Midpoint Final   Date 10/17/2024 12/10/2024    ROM 0-48 deg 0-51    Max Peak Torque 71  59    Min Peak Torque 5  15    Flex/Ext Ratio 14.2:1 3.9:1    % variance  normative data 52 59    % change from initial test N/A visit 1 +22  "     Starting weight 30     OUTCOMES SELECTION:   Intake Outcome Measure for FOTO Lumbar Survey     Therapist reviewed FOTO scores for Caridad Hinds on 10/15/2024.   FOTO documents entered into [employer] - see Media section.     Intake Score: 47% functional ability (SHASHANK: 20)   2024: 57%  2024: 59%  Goal Score: 62% functional ability (SHASHANK: 10)            Treatment     Caridad received the treatments listed below:      Medical MedX Treatment as follows:  MedX testing performed day 2: Patient  received neuromuscular education to engage spinal musculature correctly for motor control and engagement of musculature for 0 minutes including the MedX exercise component and practice and standard testing. MedX dynamic exercise and baseline isometric test performed with instructions to guide the patient safely through the testing procedure. Patient instructed to perform isometric test correctly and safely while building to an optimal force with a pain-free effort. Patient also instructed that they should feel support/pressure from MedX restraints but no pain/discomfort, and encouraged to report any pain to therapist. Patient demonstrated appropriate understanding of information and tolerance of test.  Education regarding purpose of test, safety during test given, and reviewed possible more soreness and strategies.                         Caridad participated in neuromuscular re-education activities to improve balance, coordination, proprioception, motor control and/or posture for 00 minutes. The following activities were included:        Caridad participated in therapeutic exercises to develop strength, endurance, ROM, flexibility, posture, and core stabilization for  55  minutes includin/4/2025    11:22 AM   [facility]   Visit Number 18   VAS Pain Rating 3   Recumbent Bike Seat Pos. 5   Flexion in Lying 10   Lumbar Weight 48 lbs   Repetitions 20   Rating of Perceived Exertion 3.5                      LTR,  "x10x5"   Hamstring stretch, x2x30" -np  Bridge, x15x3" RTB  DKTC, x10"x3 with ball   Piriformis, x2x20"   PPT, x10x5"-np  PPT to 90/90 holds 2x30"-np  90/90 heel taps x15  BKFO c/ PPT GTB 3 sec x10-np  TA contraction + SLR c/ PB x12-np  Paloff press x10 GTB  SOC x10-np  Side lying Clams RTB x10        Peripheral muscle strengthening which included one set of 15-20 repetitions at a slow and controlled 10-13 second per rep pace focused on strengthening supporting musculature in order to improve body mechanics and functional mobility. Patient and therapist focused on proper form during treatment to ensure optimal strengthening of each targeted muscle group.  Machines utilized included:Torso rotation, Leg Ext, Leg Curl, Chest Press, and Rowing  Triceps, Biceps, Hip Abd, and Leg Press      Caridad participated in dynamic functional therapeutic activities to improve functional performance and simulate household and community activities for 0 minutes. The following activities were included:    Lifting mechanics and education  Proper lifting techniques  Neutral spine with hip hinge and knee with   20x    Sit to stand 10x      Lifting demonstration with crate and load transfer from floor to mat with 5x with lumbar muscular fatigue              Caridad received manual therapy techniques for 00  minutes. The following activities were included:      Pt given HEAT pack for 5 minutes to low back in sitting.    Patient Education and Home Exercises     Home exercises include:  LTR, hamstring stretch, bridge, SKTC, piriformis, PPT   Cardio program (V5): - 11/28/2025  Lifting education (V11): - 12/17/2024  Posture/Lumbar roll: TBD  [employer] Discharge handout (date given): -  Equipment at home/gym membership: no, stopped 3 months ago d/t pain       Education provided:   - Patient received education in benefits of progressing mobility and strengthening gradually  - Pt educated in proper body mechanics and lifting techniques.  - " "Discussed exertion scale, slow progressive resistance protocol to promote safe and healthy strengthening of supportive musculature  by performing 15-20 reps, 7 sec per rep, and increasing weight by 5 % at 20 reps only if there ex done safely, slowly, using correct musculature, exertion and no pain.  Patient expressed understanding.  -Pt educated on strategies to safely perform examination and exercise using "Stop Light" analogy to describe how to avoid or stop irritating motions, proceed with caution with some movements, and progress positive exercises.     Written Home Exercises Provided: Patient instructed to cont prior HEP.  Exercises were reviewed and Caridad was able to demonstrate them prior to the end of the session.  Caridad demonstrated good  understanding of the education provided.     See EMR under Patient Instructions for exercises provided prior visit.    Assessment     Caridad presents to therapy with reports of overall improvements despite c/o R knee pain. She was able to perform therex w/o adverse effects. Resistance maintained on the lumbar medx to 44 ft lbs, she was able to complete 20 repetitions at 3/10 RPE and plan to progress as tolerated. Will continue with full peripheral strengthening and continue to progress functional strengthening.     Patient is making good progress towards established goals.  Pt will continue to benefit from skilled outpatient physical therapy to address the deficits stated in the impairment chart, provide pt/family education and to maximize pt's level of independence in the home and community environment.     Anticipated Barriers for therapy: chronicity of symptoms   Pt's spiritual, cultural and educational needs considered and pt agreeable to plan of care and goals as stated below:     GOALS: Pt is in agreement with the following goals.     Short term goals:  6 weeks or 10 visits   - Pt will demonstrate increased lumbar MedX ROM by at least 0-5 degrees from the initial ROM " value with improvements noted in functional ROM and ability to perform ADLs. Met 12/10/2024  - Pt will demonstrate increased MedX average isometric strength value by 5-10% from initial test resulting in improved ability to perform bending, lifting, and carrying activities safely, confidently. Met 12/10/2024  - Pt will report a reduction in worst pain score by 1-2 points for improved tolerance for standing for 15 minutes or more. Partially Met  - Pt able to perform HEP correctly with minimal cueing or supervision from therapist to encourage independent management of symptoms. Met 12/10/2024     Long term goals: 10 weeks or 20 visits   - Pt will demonstrate increased lumbar MedX ROM by at least 5-10 degrees from initial ROM value, resulting in improved ability to perform functional forward bending while standing and sitting. Appropriate and Ongoing  - Pt will demonstrate increased MedX average isometric strength value by 50-75% from initial test resulting in improved ability to perform bending, lifting, and carrying activities safely and confidently. Appropriate and Ongoing  - Pt to demonstrate ability to independently control and reduce their pain through posture positioning and mechanical movements throughout a typical day. Appropriate and Ongoing  - Pt will demonstrate reduced pain and improved functional outcomes as reported on the FOTO by reaching an intake score of >/= 62% functional ability in order to demonstrate subjective improvement in patient's condition. . Appropriate and Ongoing  - Pt will demonstrate independence with the HEP at discharge. Appropriate and Ongoing  - Patient will be able to complete forward bending while gardening with minimal to no complications indicating improved tolerance to activity. Appropriate and Ongoing       Plan     Continue with established Plan of Care towards established PT goals.     Therapist: Ana Olivares, PTA  2/6/2025

## 2025-02-06 NOTE — PROGRESS NOTES
Outpatient Rehab    Physical Therapy Visit    Patient Name: Caridad Hinds  MRN: [id number]  YOB: 1945  Today's Date: 2/6/2025    Therapy Diagnosis:   Encounter Diagnoses   Name Primary?    Decreased range of motion of trunk and back Yes    Decreased strength of trunk and back      Physician: Lorena Beatty NP    Physician Orders: Eval and Treat  Medical Diagnosis from Referral: M51.36 (ICD-10-CM) - DDD (degenerative disc disease), lumbar; M48.062 (ICD-10-CM) - Spinal stenosis of lumbar region with neurogenic claudication; M54.16 (ICD-10-CM) - Lumbar radiculopathy  Evaluation Date: 10/15/2024  Authorization Period Expiration: 06/25/2025  Plan of Care Expiration: 2/20/2025  Reassessment Due: 2/10/2024  Visit # / Visits authorized: 19/20    MedX testing visit 1    PTA Visit #: 0/5   INSURANCE and OUTCOMES: Fee for Service with FOTO Outcomes 1/3  Pattern of pain determined: PEN  Time In: 1100   Time Out: 1200  Total Time: 60   Total Billable Time: 55    Precautions     standard      Subjective   Patient reports no low back pain; however, she notices stiffness. Her R knee pain is bothering her the most. Patient notices exercises are helping alleviate her symptoms to the point where she does not need to take medication..  Pain reported as 0/10.      Objective        Lumbar  Isometric Testing on Med X equipment: Testing administered by PT    Test Initial Baseline Midpoint Final   Date 10/17/2024 12/10/2024    ROM 0-48 deg 0-51    Max Peak Torque 71  59    Min Peak Torque 5  15    Flex/Ext Ratio 14.2:1 3.9:1    % variance  normative data 52 59    % change from initial test N/A visit 1 +22         OUTCOMES SELECTION:   Intake Outcome Measure for FOTO Lumbar Survey     Therapist reviewed FOTO scores for Caridad Hinds on 10/15/2024.   FOTO documents entered into [employer] - see Media section.     Intake Score: 47% functional ability (SHASHANK: 20)   11/06/2024: 57%  12/11/2024: 59%  Goal Score: 62% functional ability (SHASHANK:  "10)           Treatment:  Therapeutic Exercise  Therapeutic Exercise Activity 1: LTR, x10x5"  Therapeutic Exercise Activity 2: Bridge, x15x3" RTB  Therapeutic Exercise Activity 3: DKTC, x10"x3 with ball  Therapeutic Exercise Activity 4: Piriformis, x2x20"  Therapeutic Exercise Activity 5: 90/90 heel taps x15  Therapeutic Exercise Activity 6: Paloff press x10 GTB  Therapeutic Exercise Activity 7: Side lying Clams RTB x10           2/6/2025    11:20 AM   [facility]   Visit Number 19   VAS Pain Rating 0   Recumbent Bike Seat Pos. 5   Flexion in Lying 10   Lumbar Weight 53 lbs   Repetitions 15            Patient's spiritual, cultural, and educational needs considered and patient agreeable to plan of care and goals.     Assessment & Plan   Assessment: Pt presents to therapy with low back stiffness and severe R knee pain. She tolerated exercises well without exhibiting further exacerbation of symptoms. Lumbar MedX resistance increased to 53 ft/lbs with completion of 15 reps at 3/10 RPE. Peripheral circuit performed without difficulty. Will progress HB protocol as tolerated by pt. Issued/reviewed fridge magnet with patient understanding.  Evaluation/Treatment Tolerance: Patient tolerated treatment well        Plan: Plan to retest and discharge NV upon completion of 20 visit program.    Goals: Pt is in agreement with the following goals.     Short term goals:  6 weeks or 10 visits   - Pt will demonstrate increased lumbar MedX ROM by at least 0-5 degrees from the initial ROM value with improvements noted in functional ROM and ability to perform ADLs. Met 12/10/2024  - Pt will demonstrate increased MedX average isometric strength value by 5-10% from initial test resulting in improved ability to perform bending, lifting, and carrying activities safely, confidently. Met 12/10/2024  - Pt will report a reduction in worst pain score by 1-2 points for improved tolerance for standing for 15 minutes or more. Partially Met  - " Pt able to perform HEP correctly with minimal cueing or supervision from therapist to encourage independent management of symptoms. Met 12/10/2024     Long term goals: 10 weeks or 20 visits   - Pt will demonstrate increased lumbar MedX ROM by at least 5-10 degrees from initial ROM value, resulting in improved ability to perform functional forward bending while standing and sitting. Appropriate and Ongoing  - Pt will demonstrate increased MedX average isometric strength value by 50-75% from initial test resulting in improved ability to perform bending, lifting, and carrying activities safely and confidently. Appropriate and Ongoing  - Pt to demonstrate ability to independently control and reduce their pain through posture positioning and mechanical movements throughout a typical day. Appropriate and Ongoing  - Pt will demonstrate reduced pain and improved functional outcomes as reported on the FOTO by reaching an intake score of >/= 62% functional ability in order to demonstrate subjective improvement in patient's condition. . Appropriate and Ongoing  - Pt will demonstrate independence with the HEP at discharge. Appropriate and Ongoing  - Patient will be able to complete forward bending while gardening with minimal to no complications indicating improved tolerance to activity. Appropriate and Ongoing    Meena Hackett, PT

## 2025-02-13 ENCOUNTER — CLINICAL SUPPORT (OUTPATIENT)
Dept: REHABILITATION | Facility: OTHER | Age: 80
End: 2025-02-13
Payer: MEDICARE

## 2025-02-13 DIAGNOSIS — M25.69 DECREASED RANGE OF MOTION OF TRUNK AND BACK: Primary | ICD-10-CM

## 2025-02-13 DIAGNOSIS — R29.898 DECREASED STRENGTH OF TRUNK AND BACK: ICD-10-CM

## 2025-02-13 PROCEDURE — 97112 NEUROMUSCULAR REEDUCATION: CPT

## 2025-02-13 PROCEDURE — 97110 THERAPEUTIC EXERCISES: CPT

## 2025-02-13 NOTE — PROGRESS NOTES
Outpatient Rehab    Physical Therapy Visit    Patient Name: Caridad Hinds  MRN: [id number]  YOB: 1945  Today's Date: 2/13/2025    Therapy Diagnosis:   Encounter Diagnoses   Name Primary?    Decreased range of motion of trunk and back Yes    Decreased strength of trunk and back      Physician: Lorena Beatty NP    Physician Orders: Eval and Treat  Medical Diagnosis from Referral: M51.36 (ICD-10-CM) - DDD (degenerative disc disease), lumbar; M48.062 (ICD-10-CM) - Spinal stenosis of lumbar region with neurogenic claudication; M54.16 (ICD-10-CM) - Lumbar radiculopathy  Evaluation Date: 10/15/2024  Authorization Period Expiration: 06/25/2025  Plan of Care Expiration: 2/20/2025  Reassessment Due: 2/10/2024  Visit # / Visits authorized: 20/20    MedX testing visit 1     Time In: 1330   Time Out: 1430  Total Time: 60   Total Billable Time: 55    FOTO:  Intake Score: 59%  Survey Score 1:  %  Survey Score 2:  %         Subjective   Patient reports her low back is feeling much better. She has minimal to moderate R shoulder pain..  Pain reported as 0/10.      Objective        Expand All Collapse All    Outpatient Rehab     Physical Therapy Visit     Patient Name: Caridad Hinds  MRN: [id number]  YOB: 1945  Today's Date: 2/6/2025     Therapy Diagnosis:        Encounter Diagnoses   Name Primary?    Decreased range of motion of trunk and back Yes    Decreased strength of trunk and back        Physician: Lorena Beatty NP     Physician Orders: Eval and Treat  Medical Diagnosis from Referral: M51.36 (ICD-10-CM) - DDD (degenerative disc disease), lumbar; M48.062 (ICD-10-CM) - Spinal stenosis of lumbar region with neurogenic claudication; M54.16 (ICD-10-CM) - Lumbar radiculopathy  Evaluation Date: 10/15/2024  Authorization Period Expiration: 06/25/2025  Plan of Care Expiration: 2/20/2025  Reassessment Due: 2/10/2024  Visit # / Visits authorized: 19/20    MedX testing visit 1     PTA Visit #: 0/5   INSURANCE  "and OUTCOMES: Fee for Service with FOTO Outcomes 1/3  Pattern of pain determined: PEN  Time In: 1100   Time Out: 1200  Total Time: 60   Total Billable Time: 55     Precautions     standard          Subjective  Patient reports no low back pain; however, she notices stiffness. Her R knee pain is bothering her the most. Patient notices exercises are helping alleviate her symptoms to the point where she does not need to take medication..  Pain reported as 0/10.          Objective     Lumbar  Isometric Testing on Med X equipment: Testing administered by PT     Test Initial Baseline Midpoint Final   Date 10/17/2024 12/10/2024  2/13/25   ROM 0-48 deg 0-51  0-60 deg   Max Peak Torque 71  59  77   Min Peak Torque 5  15  19   Flex/Ext Ratio 14.2:1 3.9:1  4.1   % variance  normative data 52 59  -59%   % change from initial test N/A visit 1 +22  -8%         Treatment:  Therapeutic Exercise  Therapeutic Exercise Activity 1: LTR, x10x5"  Therapeutic Exercise Activity 2: Bridge, x15x3" RTB  Therapeutic Exercise Activity 3: DKTC, x10"x3 with ball  Therapeutic Exercise Activity 4: Piriformis, x2x20"  Therapeutic Exercise Activity 5: 90/90 heel taps x15  Therapeutic Exercise Activity 6: Paloff press x10 GTB  Therapeutic Exercise Activity 7: Side lying Clams RTB x10    Balance/Neuromuscular Re-Education  Balance/Neuromuscular Re-Education Activity 1: Lumbar MedX Isometric Standard Testing        2/13/2025     2:15 PM   [facility]   Visit Number 20   VAS Pain Rating 0   Recumbent Bike Seat Pos. 5   Flexion in Lying 10   Lumbar Flexion 60   Lumbar Extension 0   Lumbar Peak Torque 77 ft. lbs.   Min Torque 19   Test Percent Below Normative Data 59 %           Assessment & Plan   Assessment: Pt presents to last visit with reports of no adverse symptoms. She performed exercises without exhibiting further exacerbation of symptoms. Lumbar MedX standard isometric testing performed today. Noted improve motor control, ROM and slight " increase in strength. Peripheral circuit performed without difficulty. Patient in agreement to discharge plans.  Evaluation/Treatment Tolerance: Patient tolerated treatment well    Patient will continue to benefit from skilled outpatient physical therapy to address the deficits listed in the problem list box on initial evaluation, provide pt/family education and to maximize pt's level of independence in the home and community environment.     Patient's spiritual, cultural, and educational needs considered and patient agreeable to plan of care and goals.           Plan: Retest and discharge this visit upon completion of 20 visit program.    Goals: Goals: Pt is in agreement with the following goals.     Short term goals:  6 weeks or 10 visits   - Pt will demonstrate increased lumbar MedX ROM by at least 0-5 degrees from the initial ROM value with improvements noted in functional ROM and ability to perform ADLs. Met 12/10/2024  - Pt will demonstrate increased MedX average isometric strength value by 5-10% from initial test resulting in improved ability to perform bending, lifting, and carrying activities safely, confidently. Met 12/10/2024  - Pt will report a reduction in worst pain score by 1-2 points for improved tolerance for standing for 15 minutes or more. Partially Met  - Pt able to perform HEP correctly with minimal cueing or supervision from therapist to encourage independent management of symptoms. Met 12/10/2024     Long term goals: 10 weeks or 20 visits   - Pt will demonstrate increased lumbar MedX ROM by at least 5-10 degrees from initial ROM value, resulting in improved ability to perform functional forward bending while standing and sitting. MET  - Pt will demonstrate increased MedX average isometric strength value by 50-75% from initial test resulting in improved ability to perform bending, lifting, and carrying activities safely and confidently. NOT MET  - Pt to demonstrate ability to independently  control and reduce their pain through posture positioning and mechanical movements throughout a typical day. MET  - Pt will demonstrate reduced pain and improved functional outcomes as reported on the FOTO by reaching an intake score of >/= 62% functional ability in order to demonstrate subjective improvement in patient's condition. . Appropriate and Ongoing  - Pt will demonstrate independence with the HEP at discharge. . MET  - Patient will be able to complete forward bending while gardening with minimal to no complications indicating improved tolerance to activity. . NOT MET    Meena Hackett, PT

## 2025-02-24 ENCOUNTER — OFFICE VISIT (OUTPATIENT)
Dept: SPINE | Facility: CLINIC | Age: 80
End: 2025-02-24
Attending: ANESTHESIOLOGY
Payer: MEDICARE

## 2025-02-24 VITALS — SYSTOLIC BLOOD PRESSURE: 134 MMHG | OXYGEN SATURATION: 99 % | DIASTOLIC BLOOD PRESSURE: 65 MMHG | HEART RATE: 91 BPM

## 2025-02-24 DIAGNOSIS — M17.11 ARTHRITIS OF RIGHT KNEE: ICD-10-CM

## 2025-02-24 DIAGNOSIS — M19.011 ARTHRITIS OF RIGHT SHOULDER REGION: Primary | ICD-10-CM

## 2025-02-24 PROCEDURE — 99999 PR PBB SHADOW E&M-EST. PATIENT-LVL III: CPT | Mod: PBBFAC,,, | Performed by: ANESTHESIOLOGY

## 2025-02-24 PROCEDURE — 99213 OFFICE O/P EST LOW 20 MIN: CPT | Mod: PBBFAC | Performed by: ANESTHESIOLOGY

## 2025-02-24 PROCEDURE — 99214 OFFICE O/P EST MOD 30 MIN: CPT | Mod: S$PBB,GC,, | Performed by: ANESTHESIOLOGY

## 2025-02-24 RX ORDER — CELECOXIB 100 MG/1
100 CAPSULE ORAL DAILY
Qty: 30 CAPSULE | Refills: 2 | Status: SHIPPED | OUTPATIENT
Start: 2025-02-24 | End: 2025-05-25

## 2025-02-24 NOTE — PROGRESS NOTES
Chronic Pain Established Patient       PCP: Haris Saba Jr., MD      CHIEF COMPLAINT: low back pain        INTERVAL HISTORY (2/24/25):  Patient returns to clinic for follow up of low back pain, as well as R shoulder and R knee pain. At her [facility], patient was sent for R shoulder and knee XR, full results below, both of which revealed DJD. Patient continues to have R shoulder and R knee pain, knee pain is worse than shoulder. She continues with [facility] program exercises, which she enjoys very much. Her R knee pain is described as aching and throbbing, focused to the anterior knee. Mitigated by exercise and lidocaine patches. Exacerbated by cold weather. She denies red flag symptoms. Knee pain is a 5/10 today, can become 8/10. Patient states she does not think Gabapentin has been helpful, but she does not take it regularly. She is out of Celebrex.       INTERVAL HISTORY 12/19/24:  Ms. Hinds returns to clinic for follow-up of lower back pain.  She was sent to participate in healthy back program, which has been extremely helpful.  She continues to take gabapentin and Celebrex with some relief.  She believes the physical therapy has been more helpful than the medications. Patient's pain is in the L lower back and can move to the right, this can subside with the physical therapy. Describes the pain as aching and can become sharp. Exacerbated by overly exerting herself. Improved with PT. She denies red flag symptoms.      Patient complains of R knee pain that began one month ago. The pain is around the anterior knee. She describes the pain as sore, stiff and tight. Exacerbated with exertion and cold weather, improved with rest. She reports a similar pain in the R shoulder which extends into the R upper arm. She rates the knee pain as 2/10, at its worse 8/10. Shoulder pain is 2/10, at its worse 8/10.         INTERVAL HISTORY  9/18/2024:   Caridad Hinds returns to clinic for follow-up after right L4/5 and L5/S1 TFESI on 9/4/2024. She reports 60% pain relief. She continues gabapentin and celebrex with some relief. She denies any side effects.  She denies recent health changes. She denies recent falls or trauma. She denies new onset fever/night sweats, urinary incontinence, bowel incontinence, significant weight changes, significant motor weakness or changes, or loss of sensations. Her pain today is 5/10.      INTERVAL HISTORY 8/22/24:   Patients returns for follow up low back pain with radiculopathy. Patient underwent caudal JUSTICE on 7/24/24. This provided 75% relief for four or five days, but her pain and stiffness returned. Patient sent for lumbar and hip XR, results below. Patient started on gabapentin and Celebrex which she continues to take with minimal benefit. Today, patient continues to have low back pain a 6/10, the pain occasionally radiates down the RLE laterally. Describes it is as constant aching, intermittently shooting.  Patient does note motor weakness to the right leg. She denies red flag symptoms.      Initial History 6/27/24:  Caridad Hinds presents to the clinic for the evaluation of low back pain radiating into her bilateral legs (L>>R). The pain started  8/2023 ago following no particular trauma or incident and symptoms have been worsening.The pain is located in the right lateral low back/hip area and radiates to the lateral thigh, anterior shin and foot.  The pain is described as aching although it was previously numb and tingling with associated heron horses and is rated as 5/10. The pain is rated with a score of  5/10 on the BEST day and a score of 7/10 on the WORST day.  Symptoms interfere with daily activity and work. The pain is exacerbated by cold, working in the garden, cleaning house, being on feet all day. She used to work out regularly and had to stop because of the pain.  The pain is mitigated by heat,       Patient denies night fever/night sweats, urinary incontinence, bowel incontinence, significant weight loss, significant motor weakness, and loss of sensations.          2/24/2025     1:25 PM   Last 3 PDI Scores   Pain Disability Index (PDI) 11         Conservative therapy:  PT: Participating in Healthy [facility] program October 2024-Present  Chiro: none   HEP in the past 6 months: patient preforming daily HEP with limited benefit            Pain Medications:  Tylenol 1g daily  Gabapentin   Celebrex       report:  Reviewed and consistent with medication use as prescribed.     Pain Procedures:   2015 - JUSTICE with Dr. Bobo  7/24/2024 - caudal JUSTICE   9/4/2024 - right L4/5 and L5/S1 TFESI - 60% relief     Imaging:   XR LUMBAR SPINE AP AND LAT WITH FLEX/EXT 6/27/24     CLINICAL HISTORY:  Spondylolisthesis, lumbar region     TECHNIQUE:  AP and lateral views as well as lateral flexion and extension images are performed through the lumbar spine.     COMPARISON:  11/01/2023     FINDINGS:  Lumbar vertebral body heights are maintained.     Disc space narrowing and endplate changes at nearly every level.  Facet arthropathy at a centrally every level..     Slight grade 1 anterolisthesis L3-4 and L4-5 with no significant change with flexion or extension.  Levoconvex curvature thoracolumbar spine.     Impression:     No acute osseous abnormality seen.     Multilevel degenerative change with grade 1 anterolisthesis L3-4 and L4-5.  No evidence for dynamic instability.     XR HIPS BILATERAL 2 VIEW INCL AP PELVIS 6/27/24     CLINICAL HISTORY:  Spondylolisthesis, lumbar region     TECHNIQUE:  AP view of the pelvis and frogleg lateral views of both hips were performed.     COMPARISON:  04/19/2016     FINDINGS:  Femoral heads are well located with respect to the acetabula.  Femoral heads maintain a normal round contour.  No acute fracture seen.  Mild osteophyte formation and narrowing of the femoroacetabular joints     Impression:     No  acute osseous abnormality seen.     11/1/2024 XRAY Report/ Interpretation :    AP pelvis x-ray was taken today. Indications low back pain and/or hip pain. Findings AP pelvis x-ray appears to be normal with no evidence of fractures or significant degenerative disease  AP and lateral x-rays of lumbar spine will performed today. Indications low back pain. Findings:  Grade 1 degenerative spondylolisthesis L4-5 marked disc space narrowing lower lumbar spine most marked multilevel        Past Medical History:   Diagnosis Date    Hyperlipidemia     Hypertension     Thyroid disease      Past Surgical History:   Procedure Laterality Date    EPIDURAL STEROID INJECTION N/A 7/24/2024    Procedure: CAUDAL JUSTICE *PLAVIX CLEARANCE REQUESTED*;  Surgeon: Yonas Pelaez MD;  Location: [facility];  Service: Pain Management;  Laterality: N/A;  [phone]  2 WK F/U [facility]    falopian Left 1986    ectopic pregnancy    THYROIDECTOMY, PARTIAL N/A 1972    TRANSFORAMINAL EPIDURAL INJECTION OF STEROID Right 9/4/2024    Procedure: LUMBAR TRANSFORAMNAL RIGHT L4/5 AND L5/S1 *PLAVIX CLEARANCE REQUESTED*;  Surgeon: Yonas Pelaez MD;  Location: [facility];  Service: Pain Management;  Laterality: Right;  [phone]  2 WK F/U [facility]     Social History[1]  No family history on file.    Review of patient's allergies indicates:   Allergen Reactions    Darvocet a500 [propoxyphene n-acetaminophen] Hives     Combination drug containing Darvocet, flexeril, & ibuprofen caused hives; does not know which ingredient caused the reaction    Flexeril [cyclobenzaprine] Hives     Combination drug containing Darvocet, flexeril, & ibuprofen caused hives; does not know which ingredient caused the reaction    Motrin [ibuprofen] Hives     Combination drug containing Darvocet, flexeril, & ibuprofen caused hives; does not know which ingredient caused the reaction    Hydrocodone-acetaminophen Other (See Comments)     Other reaction(s): TELMA       Sulfamethoxazole-trimethoprim Other (See Comments)    Tramadol Other (See Comments)     Other reaction(s): NVD         Current Outpatient Medications   Medication Sig    cholecalciferol, vitamin D3, 10 mcg (400 unit) Cap capsule Take by mouth.    clopidogreL (PLAVIX) 75 mg tablet Take 75 mg by mouth.    gabapentin (NEURONTIN) 300 MG capsule Take 1 capsule (300 mg total) by mouth 3 (three) times daily.    metoprolol succinate (TOPROL-XL) 100 MG 24 hr tablet Take 100 mg by mouth.    olmesartan-hydrochlorothiazide (BENICAR HCT) 40-25 mg per tablet Take 1 tablet by mouth once daily.    RESTASIS 0.05 % ophthalmic emulsion Place 1 drop into both eyes 2 (two) times daily.    rosuvastatin (CRESTOR) 10 MG tablet Take 10 mg by mouth.    thyroid, pork, (ARMOUR THYROID) 60 mg Tab Take 60 mg by mouth once daily.    dicyclomine (BENTYL) 20 mg tablet Take 20 mg by mouth 3 (three) times daily. (Patient not taking: Reported on 2/24/2025)    famotidine (PEPCID) 40 MG tablet Take 40 mg by mouth every evening.    metoprolol tartrate (LOPRESSOR) 50 MG tablet Take 50 mg by mouth 2 (two) times daily.    potassium chloride SA (K-DUR,KLOR-CON) 10 MEQ tablet Take 10 mEq by mouth once daily.     No current facility-administered medications for this visit.       ROS:  GENERAL: No fever. No chills. No fatigue. Denies weight loss. Denies weight gain.  HEENT: Denies headaches. Denies vision change. Denies eye pain. Denies double vision. Denies ear pain.   CV: Denies chest pain.   PULM: Denies of shortness of breath.  GI: Denies constipation. No diarrhea. No abdominal pain. Denies nausea. Denies vomiting. No blood in stool.  HEME: Denies bleeding problems.  : Denies urgency. No painful urination. No blood in urine.  MS: Denies joint stiffness. Denies joint swelling.    SKIN: Denies rash.   NEURO: Denies seizures. No weakness.  PSYCH:  Denies difficulty sleeping. No anxiety. Denies depression. No suicidal thoughts.       VITALS:   Vitals:     02/24/25 1320   BP: 134/65   Pulse: 91   SpO2: 99%   PainSc: 0-No pain   PainLoc: Back         PHYSICAL EXAM:   GENERAL: Well appearing, in no acute distress, alert and oriented x3.  PSYCH:  Mood and affect appropriate.  SKIN: Skin color, texture, turgor normal, no rashes or lesions.  HEENT:  Normocephalic, atraumatic. Cranial nerves grossly intact.  NECK:   Negative for pain to palpation over the cervical paraspinous muscles.   Negative for pain to palpation over facets.  Negative for pain with neck flexion, extension, or lateral flexion.   Spurling test was Negative  PULM: No evidence of respiratory difficulty, symmetric chest rise.  GI:  Non-distended  BACK:   Normal range of motion.   Negative for pain to palpation over the spinous processes.  Negative for pain to palpation over facet joints.   Negative axial loading test bilateral.   Negative for tenderness over BILATERAL SIJ.   Negative thigh and sacral thrust test  Negative FABERE, Ganselin and Yeoman's test.   EXTREMITIES:   Full ROM of bilateral shoulders. Positive Onofre and Empty Can on R side. Tender to palpation over posterior and anterior R shoulder.   R Knee: Swollen, non tender to palpation. Limited flexion and full extension on AROM. Crepitus noted on movement.   L Knee: Swollen, non tender  FADIR was Negative Bilaterally.  NEURO: Sensation is equal and appropriate bilaterally. Bilateral upper and lower extremity strength is normal and symmetric. Bilateral upper and lower extremity coordination and muscle stretch reflexes are physiologic and symmetric. Plantar response are downgoing. Straight leg raising in the supine position is Negative to radicular pain.   GAIT: Normal              ASSESSMENT: 79 y.o. year old with back, R shoulder and R knee pain, consistent with:    1. Arthritis of right shoulder region  Amb Referral/Consult to [facility]      2. Arthritis of right knee  Amb Referral/Consult to [facility]                 PLAN:    Patient Education:  Discussed the importance of physical therapy, activity modification, and a home exercise plan to help with pain and improve health.  Therapy referral:  Will refer patient to [facility].   Medications:   Patient can continue with pain medications for now per their provider since they are providing benefits, using them appropriately, and without side effects.  Prescribed Celebrex again today. Instructed patient if she wants to feel benefit from her Gabapentin, she needs to take it as scheduled.   Schedule pain intervention for: Patient not interested in shoulder or knee injections at this time.    Future consideration: Shoulder or Knee intra-articular injections.  Counseled patient: regarding the importance of activity modification, constant sleeping habits, and physical therapy.  Return to clinic: 2-3 months      Jorge Alberto Hill I have personally reviewed the history and exam of this patient and agree with the resident/fellow/NPs note as stated above.      Yonas Pelaez MD    02/24/2025             [1]   Social History  Socioeconomic History    Marital status:    Tobacco Use    Smoking status: Never    Smokeless tobacco: Never   Substance and Sexual Activity    Alcohol use: No     Social Drivers of Health     Financial Resource Strain: Patient Declined (10/6/2024)    Received from [facility]    Overall Financial Resource Strain (CARDIA)     Difficulty of Paying Living Expenses: Patient declined   Food Insecurity: Patient Declined (10/6/2024)    Received from [facility]    Hunger Vital Sign     Worried About Running Out of Food in the Last Year: Patient declined     Ran Out of Food in the Last Year: Patient declined   Transportation Needs: No Transportation Needs (10/6/2024)    Received from [facility]    PRAPARE - Transportation     Lack of Transportation (Medical): No     Lack of Transportation (Non-Medical): No   Physical Activity: Unknown  (10/6/2024)    Received from [facility]    Exercise Vital Sign     Days of Exercise per Week: 3 days   Recent Concern: Physical Activity - Insufficiently Active (10/6/2024)    Received from [facility]    Exercise Vital Sign     Days of Exercise per Week: 3 days     Minutes of Exercise per Session: 30 min   Stress: No Stress Concern Present (10/6/2024)    Received from [facility]    [employer] [employer] - Occupational Stress Questionnaire     Feeling of Stress : Not at all   Housing Stability: Unknown (10/6/2024)    Received from [facility]    Housing Stability Vital Sign     Unable to Pay for Housing in the Last Year: Patient declined

## 2025-03-13 ENCOUNTER — TELEPHONE (OUTPATIENT)
Dept: ORTHOPEDICS | Facility: CLINIC | Age: 80
End: 2025-03-13
Payer: MEDICARE

## 2025-04-10 ENCOUNTER — OFFICE VISIT (OUTPATIENT)
Dept: PAIN MEDICINE | Facility: CLINIC | Age: 80
End: 2025-04-10
Attending: ANESTHESIOLOGY
Payer: MEDICARE

## 2025-04-10 ENCOUNTER — HOSPITAL ENCOUNTER (OUTPATIENT)
Dept: RADIOLOGY | Facility: OTHER | Age: 80
Discharge: HOME | End: 2025-04-10
Attending: STUDENT IN AN ORGANIZED HEALTH CARE EDUCATION/TRAINING PROGRAM
Payer: MEDICARE

## 2025-04-10 VITALS
OXYGEN SATURATION: 100 % | DIASTOLIC BLOOD PRESSURE: 80 MMHG | RESPIRATION RATE: 19 BRPM | HEART RATE: 80 BPM | WEIGHT: 141.13 LBS | SYSTOLIC BLOOD PRESSURE: 133 MMHG | HEIGHT: 62 IN | BODY MASS INDEX: 25.97 KG/M2

## 2025-04-10 DIAGNOSIS — M17.12 OSTEOARTHRITIS OF LEFT KNEE, UNSPECIFIED OSTEOARTHRITIS TYPE: ICD-10-CM

## 2025-04-10 DIAGNOSIS — M19.019 OSTEOARTHRITIS OF SHOULDER, UNSPECIFIED LATERALITY, UNSPECIFIED OSTEOARTHRITIS TYPE: ICD-10-CM

## 2025-04-10 DIAGNOSIS — M17.11 OSTEOARTHRITIS OF RIGHT KNEE, UNSPECIFIED OSTEOARTHRITIS TYPE: ICD-10-CM

## 2025-04-10 DIAGNOSIS — M19.019 OSTEOARTHRITIS OF SHOULDER, UNSPECIFIED LATERALITY, UNSPECIFIED OSTEOARTHRITIS TYPE: Primary | ICD-10-CM

## 2025-04-10 DIAGNOSIS — M47.819 SPONDYLOSIS WITHOUT MYELOPATHY OR RADICULOPATHY: ICD-10-CM

## 2025-04-10 DIAGNOSIS — M47.897 OTHER SPONDYLOSIS, LUMBOSACRAL REGION: ICD-10-CM

## 2025-04-10 PROCEDURE — 99215 OFFICE O/P EST HI 40 MIN: CPT | Mod: PBBFAC,25 | Performed by: ANESTHESIOLOGY

## 2025-04-10 PROCEDURE — 73565 X-RAY EXAM OF KNEES: CPT | Mod: TC,FY

## 2025-04-10 PROCEDURE — 73020 X-RAY EXAM OF SHOULDER: CPT | Mod: 26,LT,, | Performed by: RADIOLOGY

## 2025-04-10 PROCEDURE — 99214 OFFICE O/P EST MOD 30 MIN: CPT | Mod: S$PBB,GC,, | Performed by: ANESTHESIOLOGY

## 2025-04-10 PROCEDURE — 73565 X-RAY EXAM OF KNEES: CPT | Mod: 26,,, | Performed by: RADIOLOGY

## 2025-04-10 PROCEDURE — 99999 PR PBB SHADOW E&M-EST. PATIENT-LVL V: CPT | Mod: PBBFAC,,, | Performed by: ANESTHESIOLOGY

## 2025-04-10 PROCEDURE — 73020 X-RAY EXAM OF SHOULDER: CPT | Mod: TC,FY,LT

## 2025-04-10 NOTE — PROGRESS NOTES
Chronic Pain Established Patient           PCP: Haris Saba Jr., MD      CHIEF COMPLAINT: back pain        INTERVAL HISTORY (4/10/25):   79 year old female returns in follow up for back pain. Patient was referred to PT for right shoulder and right knee pain. Patient was not interested in injections at that time. Patient has plans to start physical therapy on 4/14/25.  Patient uses lidocaine patches on her painful joints and it typically helps. She is taking celebrex as needed. She was taking gabapentin but then stopped taking that after taking it consistently as prescribed without benefit. Patient reports physical therapy and HEP has been her most beneficial intervention to date.   Today patient complains of bilateral right>left knee and shoulder pain. Patient denies traumatic event. Patient denies recent falls, trauma, hospitalizations, or infections. Patient has no new onset numbness, tingling, or weakness. Patient denies new onset bowel or bladder incontinence. Patient denies periectal numbness or saddle anesthesia.        INTERVAL HISTORY (2/24/25):  Patient returns to clinic for follow up of low back pain, as well as R shoulder and R knee pain. At her [facility], patient was sent for R shoulder and knee XR, full results below, both of which revealed DJD. Patient continues to have R shoulder and R knee pain, knee pain is worse than shoulder. She continues with Healthy Back program exercises, which she enjoys very much. Her R knee pain is described as aching and throbbing, focused to the anterior knee. Mitigated by exercise and lidocaine patches. Exacerbated by cold weather. She denies red flag symptoms. Knee pain is a 5/10 today, can become 8/10. Patient states she does not think Gabapentin has been helpful, but she does not take it regularly. She is out  of Celebrex.         INTERVAL HISTORY 12/19/24:  Ms. Hinds returns to clinic for follow-up of lower back pain.  She was sent to participate in healthy back program, which has been extremely helpful.  She continues to take gabapentin and Celebrex with some relief.  She believes the physical therapy has been more helpful than the medications. Patient's pain is in the L lower back and can move to the right, this can subside with the physical therapy. Describes the pain as aching and can become sharp. Exacerbated by overly exerting herself. Improved with PT. She denies red flag symptoms.      Patient complains of R knee pain that began one month ago. The pain is around the anterior knee. She describes the pain as sore, stiff and tight. Exacerbated with exertion and cold weather, improved with rest. She reports a similar pain in the R shoulder which extends into the R upper arm. She rates the knee pain as 2/10, at its worse 8/10. Shoulder pain is 2/10, at its worse 8/10.         INTERVAL HISTORY 9/18/2024:   Caridad Hinds returns to clinic for follow-up after right L4/5 and L5/S1 TFESI on 9/4/2024. She reports 60% pain relief. She continues gabapentin and celebrex with some relief. She denies any side effects.  She denies recent health changes. She denies recent falls or trauma. She denies new onset fever/night sweats, urinary incontinence, bowel incontinence, significant weight changes, significant motor weakness or changes, or loss of sensations. Her pain today is 5/10.      INTERVAL HISTORY 8/22/24:   Patients returns for follow up low back pain with radiculopathy. Patient underwent caudal JUSTICE on 7/24/24. This provided 75% relief for four or five days, but her pain and stiffness returned. Patient sent for lumbar and hip XR, results below. Patient started on gabapentin and Celebrex which she continues to take with minimal benefit. Today, patient continues to have low back pain a 6/10, the pain occasionally radiates  down the RLE laterally. Describes it is as constant aching, intermittently shooting.  Patient does note motor weakness to the right leg. She denies red flag symptoms.      Initial History 6/27/24:  Caridad Hinds presents to the clinic for the evaluation of low back pain radiating into her bilateral legs (L>>R). The pain started  8/2023 ago following no particular trauma or incident and symptoms have been worsening.The pain is located in the right lateral low back/hip area and radiates to the lateral thigh, anterior shin and foot.  The pain is described as aching although it was previously numb and tingling with associated heron horses and is rated as 5/10. The pain is rated with a score of  5/10 on the BEST day and a score of 7/10 on the WORST day.  Symptoms interfere with daily activity and work. The pain is exacerbated by cold, working in the garden, cleaning house, being on feet all day. She used to work out regularly and had to stop because of the pain.  The pain is mitigated by heat,      Patient denies night fever/night sweats, urinary incontinence, bowel incontinence, significant weight loss, significant motor weakness, and loss of sensations.        2/24/2025     1:25 PM   Last 3 PDI Scores   Pain Disability Index (PDI) 11         Conservative therapy:  PT: Participating in Healthy back program October 2024-Present  Chiro: none   HEP in the past 6 months: patient preforming daily HEP with limited benefit            Pain Medications:  Tylenol 1g daily  Gabapentin   Celebrex       report:  Reviewed and consistent with medication use as prescribed.     Pain Procedures:   2015 - JUSTICE with Dr. Bobo  7/24/2024 - caudal JUSTICE   9/4/2024 - right L4/5 and L5/S1 TFESI - 60% relief     Imaging:   XR LUMBAR SPINE AP AND LAT WITH FLEX/EXT 6/27/24     CLINICAL HISTORY:  Spondylolisthesis, lumbar region     TECHNIQUE:  AP and lateral views as well as lateral flexion and extension images are performed through the lumbar  spine.     COMPARISON:  11/01/2023     FINDINGS:  Lumbar vertebral body heights are maintained.     Disc space narrowing and endplate changes at nearly every level.  Facet arthropathy at a centrally every level..     Slight grade 1 anterolisthesis L3-4 and L4-5 with no significant change with flexion or extension.  Levoconvex curvature thoracolumbar spine.     Impression:     No acute osseous abnormality seen.     Multilevel degenerative change with grade 1 anterolisthesis L3-4 and L4-5.  No evidence for dynamic instability.     XR HIPS BILATERAL 2 VIEW INCL AP PELVIS 6/27/24     CLINICAL HISTORY:  Spondylolisthesis, lumbar region     TECHNIQUE:  AP view of the pelvis and frogleg lateral views of both hips were performed.     COMPARISON:  04/19/2016     FINDINGS:  Femoral heads are well located with respect to the acetabula.  Femoral heads maintain a normal round contour.  No acute fracture seen.  Mild osteophyte formation and narrowing of the femoroacetabular joints     Impression:     No acute osseous abnormality seen.     11/1/2024 XRAY Report/ Interpretation :    AP pelvis x-ray was taken today. Indications low back pain and/or hip pain. Findings AP pelvis x-ray appears to be normal with no evidence of fractures or significant degenerative disease  AP and lateral x-rays of lumbar spine will performed today. Indications low back pain. Findings:  Grade 1 degenerative spondylolisthesis L4-5 marked disc space narrowing lower lumbar spine most marked multilevel        Past Medical History:   Diagnosis Date    Hyperlipidemia     Hypertension     Thyroid disease      Past Surgical History:   Procedure Laterality Date    EPIDURAL STEROID INJECTION N/A 7/24/2024    Procedure: CAUDAL JUSTICE *PLAVIX CLEARANCE REQUESTED*;  Surgeon: Yonas Pelaez MD;  Location: [facility];  Service: Pain Management;  Laterality: N/A;  [phone]  2 WK F/U FR[facility]    falopian Left 1986    ectopic pregnancy    THYROIDECTOMY, PARTIAL N/A  1972    TRANSFORAMINAL EPIDURAL INJECTION OF STEROID Right 9/4/2024    Procedure: LUMBAR TRANSFORAMNAL RIGHT L4/5 AND L5/S1 *PLAVIX CLEARANCE REQUESTED*;  Surgeon: Yonas Pelaez MD;  Location: [facility];  Service: Pain Management;  Laterality: Right;  [phone]  2 WK F/U [facility]     Social History[1]  No family history on file.    Review of patient's allergies indicates:   Allergen Reactions    Darvocet a500 [propoxyphene n-acetaminophen] Hives     Combination drug containing Darvocet, flexeril, & ibuprofen caused hives; does not know which ingredient caused the reaction    Flexeril [cyclobenzaprine] Hives     Combination drug containing Darvocet, flexeril, & ibuprofen caused hives; does not know which ingredient caused the reaction    Motrin [ibuprofen] Hives     Combination drug containing Darvocet, flexeril, & ibuprofen caused hives; does not know which ingredient caused the reaction    Hydrocodone-acetaminophen Other (See Comments)     Other reaction(s): NVD      Sulfamethoxazole-trimethoprim Other (See Comments)    Tramadol Other (See Comments)     Other reaction(s): NVD         Current Outpatient Medications   Medication Sig    celecoxib (CELEBREX) 100 MG capsule Take 1 capsule (100 mg total) by mouth once daily.    cholecalciferol, vitamin D3, 10 mcg (400 unit) Cap capsule Take by mouth.    clopidogreL (PLAVIX) 75 mg tablet Take 75 mg by mouth.    dicyclomine (BENTYL) 20 mg tablet Take 20 mg by mouth 3 (three) times daily.    famotidine (PEPCID) 40 MG tablet Take 40 mg by mouth every evening.    metoprolol succinate (TOPROL-XL) 100 MG 24 hr tablet Take 100 mg by mouth.    metoprolol tartrate (LOPRESSOR) 50 MG tablet Take 50 mg by mouth 2 (two) times daily.    olmesartan-hydrochlorothiazide (BENICAR HCT) 40-25 mg per tablet Take 1 tablet by mouth once daily.    RESTASIS 0.05 % ophthalmic emulsion Place 1 drop into both eyes 2 (two) times daily.    rosuvastatin (CRESTOR) 10 MG tablet Take 10 mg by  "mouth.    thyroid, pork, (ARMOUR THYROID) 60 mg Tab Take 60 mg by mouth once daily.    gabapentin (NEURONTIN) 300 MG capsule Take 1 capsule (300 mg total) by mouth 3 (three) times daily.    potassium chloride SA (K-DUR,KLOR-CON) 10 MEQ tablet Take 10 mEq by mouth once daily.     No current facility-administered medications for this visit.         ROS:  GENERAL: No fever. No chills. No fatigue. Denies weight loss. Denies weight gain.  HEENT: Denies headaches. Denies vision change. Denies eye pain. Denies double vision. Denies ear pain.   CV: Denies chest pain.   PULM: Denies of shortness of breath.  GI: Denies constipation. No diarrhea. No abdominal pain. Denies nausea. Denies vomiting. No blood in stool.  HEME: Denies bleeding problems.  : Denies urgency. No painful urination. No blood in urine.  MS: Denies joint stiffness. Denies joint swelling.    SKIN: Denies rash.   NEURO: Denies seizures. No weakness.  PSYCH:  Denies difficulty sleeping. No anxiety. Denies depression. No suicidal thoughts.          VITALS:   Vitals:    04/10/25 1032   BP: 133/80   Pulse: 80   Resp: 19   SpO2: 100%   Weight: 64 kg (141 lb 1.5 oz)   Height: 5' 2" (1.575 m)   PainSc:   4   PainLoc: Back         PHYSICAL EXAM:   GENERAL: Well appearing, in no acute distress, alert and oriented x3.  PSYCH:  Mood and affect appropriate.  SKIN: Skin color, texture, turgor normal, no rashes or lesions.  HEENT:  Normocephalic, atraumatic. Cranial nerves grossly intact.  NECK:   Negative for pain to palpation over the cervical paraspinous muscles.   Negative for pain to palpation over facets.  Negative for pain with neck flexion, extension, or lateral flexion.   Spurling test was Negative  PULM: No evidence of respiratory difficulty, symmetric chest rise.  GI:  Non-distended  BACK:   Normal range of motion.   Positive axial loading test bilateral. Positive tenderness over both SIJ with positive thigh and sacral thrust test, Positive FABERE,Ganselin and " Yeoman's test on the both side.negative FADIR    EXTREMITIES:   No deformities, edema, or skin discoloration.   Shoulder and knee provocative maneuvers are positive. No atrophy is noted.   FADIR was Negative.   POSITIVE for BILATERAL knee limited ROM with tenderness on palpation and crepitus on movement, negative ant and post drawer sign.  NEURO: Sensation is equal and appropriate bilaterally. Bilateral upper and lower extremity strength is normal and symmetric. Bilateral upper and lower extremity coordination and muscle stretch reflexes are physiologic and symmetric. Plantar response are downgoing. Straight leg raising in the supine position is Negative to radicular pain.   GAIT: Antalgic gait              ASSESSMENT: 79 y.o. year old with multiple joints and back pain, consistent with:    1. Osteoarthritis of shoulder, unspecified laterality, unspecified osteoarthritis type  X-Ray Shoulder Left 1 View    X-Ray Knee AP Standing Bilateral      2. Osteoarthritis of right knee, unspecified osteoarthritis type  X-Ray Shoulder Left 1 View    X-Ray Knee AP Standing Bilateral      3. Osteoarthritis of left knee, unspecified osteoarthritis type  X-Ray Shoulder Left 1 View    X-Ray Knee AP Standing Bilateral      4. Spondylosis without myelopathy or radiculopathy        5. Other spondylosis, lumbosacral region                  PLAN:    Patient Education:  Discussed the importance of physical therapy, activity modification, and a home exercise plan to help with pain and improve health.  Therapy referral:   Continue with HEP learned in healthy back PT  Continue follow up with Healthy Joint program  Medications:   Patient can continue with pain medications for now per their provider since they are providing benefits, using them appropriately, and without side effects.  Continue celebrex prn  Schedule pain intervention for:  none at this time   Future consideration: consider knee and subacromial injection in the future pending  PT  Counseled patient: regarding the importance of activity modification, constant sleeping habits, and physical therapy.  Return to clinic: 3 months          Kayode Carpenter  . I have personally reviewed the history and exam of this patient and agree with the resident/fellow/NPs note as stated above.    Yonas Pelaez MD    04/10/2025            [1]   Social History  Socioeconomic History    Marital status:    Tobacco Use    Smoking status: Never    Smokeless tobacco: Never   Substance and Sexual Activity    Alcohol use: No     Social Drivers of Health     Financial Resource Strain: Patient Declined (10/6/2024)    Received from [facility]    Overall Financial Resource Strain (CARDIA)     Difficulty of Paying Living Expenses: Patient declined   Food Insecurity: Patient Declined (10/6/2024)    Received from [facility]    Hunger Vital Sign     Worried About Running Out of Food in the Last Year: Patient declined     Ran Out of Food in the Last Year: Patient declined   Transportation Needs: No Transportation Needs (10/6/2024)    Received from [facility]    PRAPARE - Transportation     Lack of Transportation (Medical): No     Lack of Transportation (Non-Medical): No   Physical Activity: Unknown (10/6/2024)    Received from [facility]    Exercise Vital Sign     Days of Exercise per Week: 3 days   Recent Concern: Physical Activity - Insufficiently Active (10/6/2024)    Received from [facility]    Exercise Vital Sign     Days of Exercise per Week: 3 days     Minutes of Exercise per Session: 30 min   Stress: No Stress Concern Present (10/6/2024)    Received from [facility]    [employer] [place] [employer] - Occupational Stress Questionnaire     Feeling of Stress : Not at all   Housing Stability: Unknown (10/6/2024)    Received from [facility]    Housing Stability Vital Sign     Unable to Pay for Housing in the Last Year: Patient declined

## 2025-04-11 ENCOUNTER — TELEPHONE (OUTPATIENT)
Dept: SPORTS MEDICINE | Facility: CLINIC | Age: 80
End: 2025-04-11
Payer: MEDICARE

## 2025-04-11 NOTE — TELEPHONE ENCOUNTER
LVM for pt asking whether she wanted to be seen for her shoulder or her knee. Left callback number [phone]

## 2025-04-14 ENCOUNTER — TELEPHONE (OUTPATIENT)
Dept: SPORTS MEDICINE | Facility: CLINIC | Age: 80
End: 2025-04-14
Payer: MEDICARE

## 2025-04-14 ENCOUNTER — HOSPITAL ENCOUNTER (OUTPATIENT)
Dept: RADIOLOGY | Facility: HOSPITAL | Age: 80
Discharge: HOME | End: 2025-04-14
Attending: ORTHOPAEDIC SURGERY
Payer: MEDICARE

## 2025-04-14 ENCOUNTER — OFFICE VISIT (OUTPATIENT)
Dept: SPORTS MEDICINE | Facility: CLINIC | Age: 80
End: 2025-04-14
Payer: MEDICARE

## 2025-04-14 VITALS
BODY MASS INDEX: 25.03 KG/M2 | DIASTOLIC BLOOD PRESSURE: 83 MMHG | SYSTOLIC BLOOD PRESSURE: 140 MMHG | HEIGHT: 62 IN | WEIGHT: 136 LBS | HEART RATE: 93 BPM

## 2025-04-14 DIAGNOSIS — M17.11 ARTHRITIS OF RIGHT KNEE: ICD-10-CM

## 2025-04-14 DIAGNOSIS — M17.12 PRIMARY OSTEOARTHRITIS OF LEFT KNEE: ICD-10-CM

## 2025-04-14 DIAGNOSIS — M19.011 ARTHRITIS OF RIGHT SHOULDER REGION: ICD-10-CM

## 2025-04-14 DIAGNOSIS — M25.561 RIGHT KNEE PAIN, UNSPECIFIED CHRONICITY: ICD-10-CM

## 2025-04-14 DIAGNOSIS — M25.561 RIGHT KNEE PAIN, UNSPECIFIED CHRONICITY: Primary | ICD-10-CM

## 2025-04-14 DIAGNOSIS — M17.11 PRIMARY OSTEOARTHRITIS OF RIGHT KNEE: Primary | ICD-10-CM

## 2025-04-14 PROCEDURE — 99999 PR PBB SHADOW E&M-EST. PATIENT-LVL IV: CPT | Mod: PBBFAC,,, | Performed by: ORTHOPAEDIC SURGERY

## 2025-04-14 PROCEDURE — 73564 X-RAY EXAM KNEE 4 OR MORE: CPT | Mod: TC,50,PN

## 2025-04-14 PROCEDURE — 73564 X-RAY EXAM KNEE 4 OR MORE: CPT | Mod: 26,50,, | Performed by: RADIOLOGY

## 2025-04-14 PROCEDURE — 99214 OFFICE O/P EST MOD 30 MIN: CPT | Mod: PBBFAC,25,PN | Performed by: ORTHOPAEDIC SURGERY

## 2025-04-14 NOTE — TELEPHONE ENCOUNTER
Asked the pt whether she wanted to be seen for her knee or her shoulder. She stated her shoulder. I let her know we will be needing new XRs because we want more views than the ones provided in the previous XR she received. I let her know she can come 20-30 minutes early to get those XR on the first floor and then come up to the second floor for her appt

## 2025-04-14 NOTE — PROGRESS NOTES
Subjective:     Chief Complaint: Caridad Hinds is a 79 y.o. female who had concerns including Pain of the Right Knee.    HPI    Patient presents to clinic with acute bilateral knee pain x 1 month. Patient states the pain began gradually and is generalized around the knee, but worse along the medial joint line. She denies any TEA or traumatic event.  Pain is made worse following periods of inactivity.  She rates the pain as 3/10 today.  She has attempted multiple conservative measures that include activity modification, ice & elevation, and oral medications (Tylenol). Is affecting ADLs and limiting desired level of activity. Denies numbness or tingling. She is here today to discuss treatment options.    No previous surgeries or trauma on bilateral knees      Review of Systems   Constitutional: Negative.   HENT: Negative.     Eyes: Negative.    Cardiovascular: Negative.    Respiratory: Negative.     Endocrine: Negative.    Hematologic/Lymphatic: Negative.    Skin: Negative.    Musculoskeletal:  Positive for arthritis, joint pain, joint swelling, muscle weakness, myalgias and stiffness. Negative for falls.   Neurological: Negative.    Psychiatric/Behavioral: Negative.     Allergic/Immunologic: Negative.                  Objective:     General: Caridad is well-developed, well-nourished, appears stated age, in no acute distress, alert and oriented to time, place and person.     General    Nursing note and vitals reviewed.  Constitutional: She is oriented to person, place, and time. She appears well-developed and well-nourished. No distress.   HENT:   Head: Normocephalic and atraumatic.   Nose: Nose normal.   Eyes: EOM are normal.   Cardiovascular:  Intact distal pulses.            Pulmonary/Chest: Effort normal. No respiratory distress.   Neurological: She is alert and oriented to person, place, and time.   Psychiatric: She has a normal mood and affect. Her behavior is normal. Judgment and thought content normal.            Right Knee Exam     Inspection   Erythema: absent  Scars: absent  Swelling: absent  Effusion: absent  Deformity: absent  Bruising: absent    Tenderness   The patient is tender to palpation of the medial joint line.    Range of Motion   Extension:  -5   Flexion:  130     Tests   Meniscus   Gerri:  Medial - negative Lateral - negative  Ligament Examination   Lachman: normal (-1 to 2mm)   PCL-Posterior Drawer: normal (0 to 2mm)     MCL - Valgus: normal (0 to 2mm)  LCL - Varus: normal  Patella   Patellar apprehension: negative  Passive Patellar Tilt: neutral  Patellar Tracking: normal  Patellar Grind: positive    Other   Muscle Tightness: hamstring tightness  Sensation: normal    Left Knee Exam     Inspection   Erythema: absent  Scars: absent  Swelling: absent  Effusion: absent  Deformity: absent  Bruising: absent    Tenderness   The patient tender to palpation of the medial joint line.    Range of Motion   Extension:  -5   Flexion:  130     Tests   Meniscus   Gerri:  Medial - negative Lateral - negative  Stability   Lachman: normal (-1 to 2mm)   PCL-Posterior Drawer: normal (0 to 2mm)  MCL - Valgus: normal (0 to 2mm)  LCL - Varus: normal (0 to 2mm)  Patella   Patellar apprehension: negative  Passive Patellar Tilt: neutral  Patellar Tracking: normal  Patellar Grind: positive    Other   Muscle Tightness: hamstring tightness  Sensation: normal    Muscle Strength   Right Lower Extremity   Quadriceps:  5/5   Hamstrin/5   Left Lower Extremity   Quadriceps:  5/5   Hamstrin/5     Vascular Exam     Right Pulses  Dorsalis Pedis:      2+  Posterior Tibial:      2+        Left Pulses  Dorsalis Pedis:      2+  Posterior Tibial:      2+        Edema  Right Lower Leg: absent  Left Lower Leg: absent    Radiographs bilateral knee     My interpretation:     There is severe DJD seen in bilateral knees with  loss of joint space and tricompartmental osteophyte formation    Kellgren Alli grade  4      Assessment:     Encounter Diagnoses   Name Primary?    Arthritis of right shoulder region     Arthritis of right knee     Primary osteoarthritis of right knee Yes    Primary osteoarthritis of left knee         Plan:       1. I made the decision to order new imaging of the extremity or extremities evaluated. I independently reviewed and interpreted the radiographs and/or MRIs today. These images were shown to the patient where I then discussed my findings in detail.    2. We have discussed a variety of treatment options including medications, injections, physical therapy and other alternative treatments. I also explained the indications, risks and benefits of surgery. Patient's pain is refractory HEP, conservative management, and NSAIDs. Had a lengthy discussion about osteoarthritis in the knees to include the primary causes to include excessive weight, trauma, genetics, and muscle weakness as well as the different forms of treatment used to help alleviate symptoms including CSI, physical therapy, and Visco supplementation injections.  At this time, Pt would like to proceed with  formal physical therapy .    3.  Ice compress to the affected area 2-3x a day for 15-20 minutes as needed for pain management.  Anti-inflammatories prn pain.    4. Ambulatory referral to [facility] for patellofemoral and quad strength and conditioning protocol.     5. RTC to see Osvaldo Morton PA-C in 8 weeks for follow-up.  Will reassess knee pain and determine if patient could benefit from CSI at that time.      All of the patient's questions were answered. Patient was advised to call the clinic or contact me through the patient portal for any questions or concerns.       Medical Dictation software was used during the dictation of portions or the entirety of this medical record.  Phonetic or grammatic errors may exist due to the use of this software. For clarification, refer to the author of the document.    Patient  questionnaires may have been collected.

## 2025-06-09 ENCOUNTER — OFFICE VISIT (OUTPATIENT)
Dept: SPORTS MEDICINE | Facility: CLINIC | Age: 80
End: 2025-06-09
Payer: MEDICARE

## 2025-06-09 VITALS
HEIGHT: 62 IN | HEART RATE: 88 BPM | DIASTOLIC BLOOD PRESSURE: 80 MMHG | BODY MASS INDEX: 24.54 KG/M2 | SYSTOLIC BLOOD PRESSURE: 129 MMHG | WEIGHT: 133.38 LBS

## 2025-06-09 DIAGNOSIS — M17.12 PRIMARY OSTEOARTHRITIS OF LEFT KNEE: ICD-10-CM

## 2025-06-09 DIAGNOSIS — M25.641 STIFFNESS OF FINGER JOINT OF RIGHT HAND: ICD-10-CM

## 2025-06-09 DIAGNOSIS — M17.11 PRIMARY OSTEOARTHRITIS OF RIGHT KNEE: Primary | ICD-10-CM

## 2025-06-09 PROCEDURE — 99999 PR PBB SHADOW E&M-EST. PATIENT-LVL IV: CPT | Mod: PBBFAC,,, | Performed by: ORTHOPAEDIC SURGERY

## 2025-06-09 PROCEDURE — 99212 OFFICE O/P EST SF 10 MIN: CPT | Mod: S$PBB,,, | Performed by: ORTHOPAEDIC SURGERY

## 2025-06-09 PROCEDURE — 99214 OFFICE O/P EST MOD 30 MIN: CPT | Mod: PBBFAC,PN | Performed by: ORTHOPAEDIC SURGERY

## 2025-06-09 NOTE — PROGRESS NOTES
Subjective:     Chief Complaint: Caridad Hinds is a 79 y.o. female who had concerns including Pain of the Right Knee.    HPI    Patient presents today for follow-up of bilateral knee pain due to osteoarthritis.  Patient is given a formal physical therapy referral at her last appointment.  She states she was told by PT that they can only work on one side at a time.  She has however been able to perform these same exercises on her left side at home.  Overall, she believes this is helping decrease her pain which is a 2/10 today.    She does endorse some new right hand stiffness, that is primarily present when driving in his requesting see provider for this.    Interval history 04/14/2025:  Patient presents to clinic with acute bilateral knee pain x 1 month. Patient states the pain began gradually and is generalized around the knee, but worse along the medial joint line. She denies any TEA or traumatic event.  Pain is made worse following periods of inactivity.  She rates the pain as 3/10 today.  She has attempted multiple conservative measures that include activity modification, ice & elevation, and oral medications (Tylenol). Is affecting ADLs and limiting desired level of activity. Denies numbness or tingling. She is here today to discuss treatment options.    No previous surgeries or trauma on bilateral knees      Review of Systems   Constitutional: Negative.   HENT: Negative.     Eyes: Negative.    Cardiovascular: Negative.    Respiratory: Negative.     Endocrine: Negative.    Hematologic/Lymphatic: Negative.    Skin: Negative.    Musculoskeletal:  Positive for arthritis, joint pain, joint swelling, muscle weakness, myalgias and stiffness. Negative for falls.   Neurological: Negative.    Psychiatric/Behavioral: Negative.     Allergic/Immunologic: Negative.                  Objective:     General: Caridad is well-developed, well-nourished, appears stated age, in no acute distress, alert and oriented to time, place and  person.     General    Nursing note and vitals reviewed.  Constitutional: She is oriented to person, place, and time. She appears well-developed and well-nourished. No distress.   HENT:   Head: Normocephalic and atraumatic.   Nose: Nose normal.   Eyes: EOM are normal.   Cardiovascular:  Intact distal pulses.            Pulmonary/Chest: Effort normal. No respiratory distress.   Neurological: She is alert and oriented to person, place, and time.   Psychiatric: She has a normal mood and affect. Her behavior is normal. Judgment and thought content normal.           Right Knee Exam     Inspection   Erythema: absent  Scars: absent  Swelling: absent  Effusion: absent  Deformity: absent  Bruising: absent    Tenderness   The patient is tender to palpation of the medial joint line.    Range of Motion   Extension:  -5   Flexion:  130     Tests   Meniscus   Gerri:  Medial - negative Lateral - negative  Ligament Examination   Lachman: normal (-1 to 2mm)   PCL-Posterior Drawer: normal (0 to 2mm)     MCL - Valgus: normal (0 to 2mm)  LCL - Varus: normal  Patella   Patellar apprehension: negative  Passive Patellar Tilt: neutral  Patellar Tracking: normal  Patellar Grind: positive    Other   Muscle Tightness: hamstring tightness  Sensation: normal    Left Knee Exam     Inspection   Erythema: absent  Scars: absent  Swelling: absent  Effusion: absent  Deformity: absent  Bruising: absent    Tenderness   The patient tender to palpation of the medial joint line.    Range of Motion   Extension:  -5   Flexion:  130     Tests   Meniscus   Gerri:  Medial - negative Lateral - negative  Stability   Lachman: normal (-1 to 2mm)   PCL-Posterior Drawer: normal (0 to 2mm)  MCL - Valgus: normal (0 to 2mm)  LCL - Varus: normal (0 to 2mm)  Patella   Patellar apprehension: negative  Passive Patellar Tilt: neutral  Patellar Tracking: normal  Patellar Grind: positive    Other   Muscle Tightness: hamstring tightness  Sensation: normal    Muscle  Strength   Right Lower Extremity   Quadriceps:  5/5   Hamstrin/5   Left Lower Extremity   Quadriceps:  5/5   Hamstrin/5     Vascular Exam     Right Pulses  Dorsalis Pedis:      2+  Posterior Tibial:      2+        Left Pulses  Dorsalis Pedis:      2+  Posterior Tibial:      2+        Edema  Right Lower Leg: absent  Left Lower Leg: absent    Radiographs bilateral knee     My interpretation:     There is severe DJD seen in bilateral knees with  loss of joint space and tricompartmental osteophyte formation    Kellgren Alli grade 4      Assessment:     Encounter Diagnoses   Name Primary?    Primary osteoarthritis of right knee Yes    Primary osteoarthritis of left knee           Plan:       1. We again discussed a variety of treatment options including medications, injections, physical therapy and other alternative treatments. I also explained the indications, risks and benefits of surgery. Patient's pain is refractory HEP, conservative management, and NSAIDs. Had a lengthy discussion about osteoarthritis in the knees to include the primary causes to include excessive weight, trauma, genetics, and muscle weakness as well as the different forms of treatment used to help alleviate symptoms including CSI, physical therapy, and Visco supplementation injections.  At this time, Pt would like to continue with formal physical therapy.    2. Referral placed to see hand and wrist specialist for further evaluation.    3.  Ice compress to the affected area 2-3x a day for 15-20 minutes as needed for pain management.  Anti-inflammatories prn pain.    4. Continue formal physical therapy with focus on patellofemoral and quad strength and conditioning protocol.     5. RTC to see ZULMA Oneil. Patient will contact our clinic in the future if symptoms in the knee worsened and she would like to discuss viscosupplementation injections.      All of the patient's questions were answered. Patient was advised to  call the clinic or contact me through the patient portal for any questions or concerns.       Medical Dictation software was used during the dictation of portions or the entirety of this medical record.  Phonetic or grammatic errors may exist due to the use of this software. For clarification, refer to the author of the document.    Patient questionnaires may have been collected.

## 2025-06-17 ENCOUNTER — TELEPHONE (OUTPATIENT)
Dept: PAIN MEDICINE | Facility: CLINIC | Age: 80
End: 2025-06-17
Payer: MEDICARE

## 2025-06-25 ENCOUNTER — TELEPHONE (OUTPATIENT)
Dept: ORTHOPEDICS | Facility: CLINIC | Age: 80
End: 2025-06-25
Payer: MEDICARE

## 2025-06-25 DIAGNOSIS — M79.641 RIGHT HAND PAIN: Primary | ICD-10-CM

## 2025-06-25 NOTE — TELEPHONE ENCOUNTER
LVM c pt. Attempting to confirm appt location & time c Dr. García with XR. Requested a call back to the [facility] at [phone] with any questions, concerns or need for a different appointment time.

## 2025-06-25 NOTE — TELEPHONE ENCOUNTER
LVM c pt. Attempting to confirm appt location & time c Dr. García with XR. Requested a call back to the [facility] at [phone] with any questions, concerns or need for a different appointment time.

## 2025-06-26 ENCOUNTER — TELEPHONE (OUTPATIENT)
Dept: ORTHOPEDICS | Facility: CLINIC | Age: 80
End: 2025-06-26
Payer: MEDICARE

## 2025-06-26 NOTE — TELEPHONE ENCOUNTER
LVM c pt. Attempting to confirm appt location & time c Dr. García with XR. Requested a call back to the [facility] at [phone] with any questions, concerns or need for a different appointment time.

## 2025-07-01 ENCOUNTER — OFFICE VISIT (OUTPATIENT)
Dept: ORTHOPEDICS | Facility: CLINIC | Age: 80
End: 2025-07-01
Payer: MEDICARE

## 2025-07-01 ENCOUNTER — HOSPITAL ENCOUNTER (OUTPATIENT)
Dept: RADIOLOGY | Facility: OTHER | Age: 80
Discharge: HOME | End: 2025-07-01
Attending: ORTHOPAEDIC SURGERY
Payer: MEDICARE

## 2025-07-01 VITALS — WEIGHT: 133.38 LBS | BODY MASS INDEX: 24.54 KG/M2 | HEIGHT: 62 IN

## 2025-07-01 DIAGNOSIS — G56.01 CARPAL TUNNEL SYNDROME OF RIGHT WRIST: Primary | ICD-10-CM

## 2025-07-01 DIAGNOSIS — M79.2 RADICULAR PAIN IN RIGHT ARM: ICD-10-CM

## 2025-07-01 DIAGNOSIS — M18.11 ARTHRITIS OF CARPOMETACARPAL (CMC) JOINT OF RIGHT THUMB: ICD-10-CM

## 2025-07-01 DIAGNOSIS — M79.641 RIGHT HAND PAIN: ICD-10-CM

## 2025-07-01 DIAGNOSIS — M25.641 STIFFNESS OF FINGER JOINT OF RIGHT HAND: ICD-10-CM

## 2025-07-01 PROCEDURE — 99999 PR PBB SHADOW E&M-EST. PATIENT-LVL III: CPT | Mod: PBBFAC,,, | Performed by: ORTHOPAEDIC SURGERY

## 2025-07-01 PROCEDURE — A4570 SPLINT: HCPCS | Mod: PBBFAC | Performed by: PHYSICIAN ASSISTANT

## 2025-07-01 PROCEDURE — 73130 X-RAY EXAM OF HAND: CPT | Mod: TC,FY,RT

## 2025-07-01 PROCEDURE — 99213 OFFICE O/P EST LOW 20 MIN: CPT | Mod: PBBFAC,25 | Performed by: ORTHOPAEDIC SURGERY

## 2025-07-01 PROCEDURE — 73130 X-RAY EXAM OF HAND: CPT | Mod: 26,RT,, | Performed by: RADIOLOGY

## 2025-07-01 PROCEDURE — 99999PBSHW PR PBB SHADOW TECHNICAL ONLY FILED TO HB: Mod: PBBFAC,,,

## 2025-07-03 PROBLEM — R25.2 SPASM: Status: RESOLVED | Noted: 2017-09-15 | Resolved: 2025-07-03

## 2025-07-03 PROBLEM — Z86.0100 HISTORY OF COLONIC POLYPS: Status: RESOLVED | Noted: 2025-07-03 | Resolved: 2025-07-03

## 2025-07-03 PROBLEM — K31.89 OTHER DISEASES OF STOMACH AND DUODENUM: Status: RESOLVED | Noted: 2022-09-23 | Resolved: 2025-07-03

## 2025-07-03 PROBLEM — H26.9 UNSPECIFIED CATARACT: Status: RESOLVED | Noted: 2025-07-03 | Resolved: 2025-07-03

## 2025-07-03 PROBLEM — R20.0 LEFT FACIAL NUMBNESS: Status: RESOLVED | Noted: 2022-11-22 | Resolved: 2025-07-03

## 2025-07-03 PROBLEM — E03.9 HYPOTHYROIDISM: Status: ACTIVE | Noted: 2017-09-15

## 2025-07-03 PROBLEM — E04.2 NONTOXIC MULTINODULAR GOITER: Status: ACTIVE | Noted: 2024-06-14

## 2025-07-03 PROBLEM — G45.9 TIA (TRANSIENT ISCHEMIC ATTACK): Status: RESOLVED | Noted: 2022-11-22 | Resolved: 2025-07-03

## 2025-07-03 PROBLEM — R19.06 EPIGASTRIC FULLNESS: Status: RESOLVED | Noted: 2025-07-03 | Resolved: 2025-07-03

## 2025-07-03 PROBLEM — E78.00 PURE HYPERCHOLESTEROLEMIA: Status: ACTIVE | Noted: 2025-07-03

## 2025-07-03 PROBLEM — K21.00 GASTRO-ESOPHAGEAL REFLUX DISEASE WITH ESOPHAGITIS, WITHOUT BLEEDING: Status: ACTIVE | Noted: 2022-09-23

## 2025-07-03 PROBLEM — K22.2 ESOPHAGEAL OBSTRUCTION: Status: RESOLVED | Noted: 2022-09-23 | Resolved: 2025-07-03

## 2025-07-03 PROBLEM — N83.201 RIGHT OVARIAN CYST: Status: RESOLVED | Noted: 2025-05-03 | Resolved: 2025-07-03

## 2025-07-03 PROBLEM — E78.5 HYPERLIPIDEMIA: Status: ACTIVE | Noted: 2017-09-15

## 2025-07-03 PROBLEM — E89.0 POSTOPERATIVE HYPOTHYROIDISM: Status: ACTIVE | Noted: 2022-11-22

## 2025-07-03 NOTE — PROGRESS NOTES
"Heide Shepard, [facility], PA-C  Orthopedic Hand and Upper Extremity    Subjective:       Patient ID: Caridad Hinds is a 79 y.o. female.    Chief Complaint: Finger Pain and Numbness of the Right Hand      Interim HPI on 07/03/2025  Ms. Hinds presents with numbness and tingling in the right hand, primarily affecting the radial aspect more than the ulnar aspect. These symptoms began a few months ago and are most noticeable when driving, particularly when grasping the steering wheel. She reports that the symptoms occur most frequently while driving and expresses concern about the impact on her ability to operate a vehicle safely. To alleviate symptoms while driving, she opens her fingers and lets the air conditioning run on the hand, which seems to help. She also reports stiffness in the right thumb.    Occasionally, she wakes up with stiffness in the right hand at night but is unsure if this is due to lying on the hand. The numbness does not increase at night. She is right-handed and denies any upper extremity symptoms originating from the neck, although reporting some occasional neck stiffness.    She is currently undergoing PT with Dr. Mercedes for the right knee, who referred her for evaluation of hand symptoms. She has not had a nerve test before and expresses a desire to address these symptoms, particularly to improve her comfort and safety while driving.    She denies any history of carpal tunnel syndrome.    PREVIOUS TREATMENTS:  Ms. Hinds has undergone PT for right knee issues.    Review of Systems    The remainder of a 14-point ROS has been performed and is otherwise negative.    Objective:      Vitals:    07/01/25 1100   Weight: 60.5 kg (133 lb 6.1 oz)   Height: 5' 2" (1.575 m)     Physical Exam    General Exam:  Constitutional:  Well developed, well nourished, Black or  female, in no acute distress  Eyes:    Normal position and movement, follows examiner appropriately, pupils round " and symmetric  Ears:    Hearing intact to conversational voice  Mouth:   Normal mucosal color without swelling or bleeding present; normal dentition for age  Psychiatric:    Normal mood, appropriate affect; alert and oriented to person, place, and time    Upper Extremity Exam:   Inspection:  Skin normal, intact, without incisional scar; muscle atrophy noted to 1st interosseous space of right hand  Palpation:   Tenderness to palpation about the base of right CMC ; no bony step-offs  Range of Motion: Relatively-full, mildly painful active ROM of right wrist  Neurologic:       Sensorimotor:  Radial         Median           Ulnar         RUE:  5     5      5 SILT of ulnar, median, and radial nerves*     LUE:  5     5      5 SILT of ulnar, median, and radial nerves  *Diminished sensation in right hand, radial greater than ulnar aspects.    Other Tests   2+ radial, ulnar pulses, < 2 second capillary refill; Negative Tinel's sign at right elbow. Negative Tinel's sign at wrist; Negative Tinel's testing bilaterally, positive CMC grind test on the right; negative bilateral Spurling's and Keny's testing    Imaging:  I have personally reviewed the XR obtained on 07/01/2025 at [facility] which demonstrate no acute osseous pathology with mild-to-moderate degenerative changes of the PIP 4th finger in more advanced at CMC space.    Radiologist's interpretation copied below:   X-Ray Hand Complete Right 07/01/2025  Some degenerative changes seen particularly at the proximal interphalangeal joint of 4th finger and prominent degenerative changes seen at the 1st carpal-metacarpal joint space.    Electronically signed by: Shawn Naranjo MD    Assessment:   Caridad is a 79 y.o. female with right hand and wrist pain and associated radicular symptoms of right upper extremity in the setting of right carpal tunnel syndrome in addition to/versus cervical radiculopathy as well as right CMC arthritis    1. Carpal tunnel syndrome of  right wrist  EMG W/ ULTRASOUND AND NERVE CONDUCTION TEST 1 Extremity      2. Stiffness of finger joint of right hand  Ambulatory referral/consult to Orthopedics    EMG W/ ULTRASOUND AND NERVE CONDUCTION TEST 1 Extremity      3. Radicular pain in right arm  EMG W/ ULTRASOUND AND NERVE CONDUCTION TEST 1 Extremity      4. Arthritis of carpometacarpal (CMC) joint of right thumb          Plan:   I had a lengthy discussion with Caridad about symptoms, clinical examination, and imaging findings. This seems to be an exacerbation of longstanding issue for them with no recent formal treatment. They present today endorsing right wrist pain and right upper extremity paresthesias. Recent imaging demonstrates advanced degenerative changes without acute findings. Importantly, they deny any red flag neurologic complaint(s) nor any focal weakness and have a benign neurologic exam for me today. In this setting, I recommend a course of conservative treatment and they elect to proceed.     I discussed the treatment options moving forward including conservative modalities versus injection versus surgery. In my opinion, an injection would entail corticosteroid injection into the right CMC, while surgery would potentially entail right CMC arthroplasty in addition to/versus right carpal tunnel release. After a long discussion, they elect to proceed with conservative care in addition to further diagnostic testing for additional treatment recommendations.  She would like to defer this CMC injection at this time.    We discussed the red flag symptoms that should prompt an urgent phone call and/or presentation to emergency department, including but not limited to, new motor weakness, new or worsening pain, or changes to sensation.    The patient's pathophysiology was explained in detail with reference to x-rays, models, other visual aids as appropriate.   I conducted a thorough review of previous records available via [employer] for optimal care  coordination.    Follow Up: After EMG/NCS with Miriam Nevarez MD   Imaging needed prior to next visit: No additional imaging necessary unless otherwise indicated.     All questions and concerns were addressed during this visit, and the patient expressed understanding and agreement with our plan. They are encouraged to call and/or return to clinic at any time if issues arise.      Heide Shepard [facility], ZULMA  Hand & Upper Extremity Orthopedics  [facility]  07/03/2025 at 9:33 AM    This note was generated with the assistance of ambient listening technology. Verbal consent was obtained by the patient and accompanying visitor(s) for the recording of patient appointment to facilitate this note. I attest to having reviewed and edited the generated note for accuracy, though some syntax or spelling errors may persist. Please contact the author of this note for any clarification.    Future Appointments   Date Time Provider Department Center   7/7/2025 10:45 AM Yonas Pelaez MD [facility]

## 2025-07-29 ENCOUNTER — PROCEDURE VISIT (OUTPATIENT)
Dept: NEUROLOGY | Facility: CLINIC | Age: 80
End: 2025-07-29
Payer: MEDICARE

## 2025-07-29 DIAGNOSIS — G56.01 CARPAL TUNNEL SYNDROME OF RIGHT WRIST: ICD-10-CM

## 2025-07-29 DIAGNOSIS — M25.641 STIFFNESS OF FINGER JOINT OF RIGHT HAND: ICD-10-CM

## 2025-07-29 DIAGNOSIS — M79.2 RADICULAR PAIN IN RIGHT ARM: ICD-10-CM

## 2025-07-29 PROCEDURE — 95886 MUSC TEST DONE W/N TEST COMP: CPT | Mod: 26,S$PBB,, | Performed by: PHYSICAL MEDICINE & REHABILITATION

## 2025-07-29 PROCEDURE — 95886 MUSC TEST DONE W/N TEST COMP: CPT | Mod: PBBFAC,PN | Performed by: PHYSICAL MEDICINE & REHABILITATION

## 2025-07-29 PROCEDURE — 95911 NRV CNDJ TEST 9-10 STUDIES: CPT | Mod: 26,S$PBB,, | Performed by: PHYSICAL MEDICINE & REHABILITATION

## 2025-07-29 PROCEDURE — 95911 NRV CNDJ TEST 9-10 STUDIES: CPT | Mod: PBBFAC,PN | Performed by: PHYSICAL MEDICINE & REHABILITATION

## 2025-07-29 NOTE — PROCEDURES
Test Date:  2025    Patient: caridad gonzalez : 1945 Physician: Ever Mcconnell D.O.   ID#: [id number] Sex: Female Ref. Phys: Heide Shepard*     HPI: Caridad Gonzalez is a 79 y.o.female who presents for NCS/EMG to evaluate for CTS and cervical radiculopathy on the right.  Note that contralateral nerve conduction studies were performed in limited fashion for comparison.      PROCEDURE:  Prior to the procedure, the procedure was discussed in detail with the patient.  All questions were answered, and verbal consent was obtained.  For nerve conduction studies, a combination of surface electrodes, bar electrodes, and/or ring electrodes were used as needed.  For needle EMG, each site was cleaned and prepped in usual fashion with an alcohol pad.  A monopolar needle (28G) was used.  There was no significant bleeding, and bandages were applied as needed.  The procedure was tolerated without adverse effect.  The patient was instructed on post-procedure care including ice if needed for 10-15 minutes up to 4 times/day for any sore muscles.  I discussed with the patient that the data would be reviewed and a report sent to the referring provider, where any follow up questions regarding next steps should be directed.        NCV & EMG Findings:  Evaluation of the right median motor nerve showed prolonged distal onset latency.  The left median sensory nerve showed prolonged distal onset latency and prolonged distal peak latency.  The right median sensory nerve showed prolonged distal onset latency, prolonged distal peak latency, and decreased conduction velocity.  All remaining nerves (as indicated in the following tables) were within normal limits.  All examined muscles (as indicated in the following table) showed no evidence of electrical instability.      IMPRESSIONS:  There is electrophysiologic evidence of a bilateral (sensory on the left, sensorimotor on the right) median mononeuropathy across the wrist  (I.e. Carpal tunnel syndrome).  There is no motor axonal loss.  There is no active denervation.  This is graded as Moderate in severity on the right, and Mild on the left.          ___________________________  Ever Mcconnell D.O.        NCS+  Motor Nerve Results      Latency Amplitude F-Lat Segment Distance CV Comment   Site (ms) Norm (mV) Norm (ms)  (cm) (m/s) Norm    Left Median (APB)   Wrist 4.1  < 4.4 9.7  > 3.8         Elbow 8.2 - 8.5 -  Elbow-Wrist 22 53  > 51    Right Median (APB)   Wrist *5.8  < 4.4 5.3  > 3.8         Elbow 9.5 - 7.6 -  Elbow-Wrist 20 53  > 51    Left Ulnar (ADM)   Wrist 2.7  < 3.7 13.8  > 3.0         Bel Elbow 6.5 - 11.9 -  Bel Elbow-Wrist 23 61  > 52    Abv Elbow 7.8 - 11.4 -  Abv Elbow-Bel Elbow 9 68  > 43    Right Ulnar (ADM)   Wrist 2.9  < 3.7 12.7  > 3.0         Bel Elbow 6.1 - 12.5 -  Bel Elbow-Wrist 23 71  > 52    Abv Elbow 7.3 - 9.7 -  Abv Elbow-Bel Elbow 9 73  > 43      Sensory Sites      Latency (O) Latency (P) Amp (P-T) Segment Distance Velocity Comment   Site (ms) Norm (ms) Norm (µV) Norm  (cm) (m/s)    Left Median (Rec:Dig II)   Wrist *3.4  < 3.3 *4.3  < 4.0 24 - Wrist-Dig II 14 42    Right Median (Rec:Dig II)   Wrist *4.7  < 3.3 *5.8  < 4.0 11 - Wrist-Dig II 14 *30    Left Ulnar (Rec:Dig V)   Wrist 2.2  < 3.1 2.9  < 4.0 65 - Wrist-Dig V 14 64    Right Ulnar (Rec:Dig V)   Wrist 2.6  < 3.1 3.5  < 4.0 76 - Wrist-Dig V 14 55    Right Radial (Rec:Wrist)   Forearm 1.58  < 2.2 2.1  < 2.8 23  > 11 Forearm-Wrist 10 63      EMG+     Side Muscle Nerve Root Ins Act Fibs Psw Amp Dur Poly Recrt Int Pat Comment   Right Deltoid Axillary C5-C6 Nml Nml Nml Nml Nml 0 Nml Nml    Right Biceps Musculocut C5-C6 Nml Nml Nml Nml Nml 0 Nml Nml    Right Triceps Radial C6-C8 Nml Nml Nml Nml Nml 0 Nml Nml    Right Pronator Teres Median C6-C7 Nml Nml Nml Nml Nml 0 Nml Nml    Right APB Median C8-T1 Nml Nml Nml Nml Nml 0 Nml Nml            Waveforms:    Motor              Sensory

## 2025-07-30 ENCOUNTER — TELEPHONE (OUTPATIENT)
Dept: ORTHOPEDICS | Facility: CLINIC | Age: 80
End: 2025-07-30
Payer: MEDICARE

## 2025-07-30 NOTE — TELEPHONE ENCOUNTER
LVM with Caridad Hinds and son's number. Requested a call back to the [facility] at [phone] or a different appointment time due to scheduling conflict.

## 2025-07-30 NOTE — TELEPHONE ENCOUNTER
Spoke c pt. Spoke c pt. Confirmed appt location & time change with Dr. García tomorrow. Appt moved to 11:45. Patient verbalized understanding and was thankful.       Copied from CRM #[id number]. Topic: General Inquiry - Return Call  >> Jul 30, 2025 12:57 PM Zack wrote:  Missed call Son  [phone]

## 2025-07-31 ENCOUNTER — OFFICE VISIT (OUTPATIENT)
Dept: ORTHOPEDICS | Facility: CLINIC | Age: 80
End: 2025-07-31
Payer: MEDICARE

## 2025-07-31 VITALS — HEIGHT: 62 IN | BODY MASS INDEX: 24.54 KG/M2 | WEIGHT: 133.38 LBS

## 2025-07-31 DIAGNOSIS — G56.01 CARPAL TUNNEL SYNDROME OF RIGHT WRIST: Primary | ICD-10-CM

## 2025-07-31 DIAGNOSIS — M25.641 STIFFNESS OF FINGER JOINT OF RIGHT HAND: ICD-10-CM

## 2025-07-31 PROCEDURE — 99214 OFFICE O/P EST MOD 30 MIN: CPT | Mod: S$PBB,,, | Performed by: ORTHOPAEDIC SURGERY

## 2025-07-31 PROCEDURE — 99999 PR PBB SHADOW E&M-EST. PATIENT-LVL III: CPT | Mod: PBBFAC,,, | Performed by: ORTHOPAEDIC SURGERY

## 2025-07-31 PROCEDURE — 99213 OFFICE O/P EST LOW 20 MIN: CPT | Mod: PBBFAC | Performed by: ORTHOPAEDIC SURGERY

## 2025-08-02 NOTE — PROGRESS NOTES
Subjective:     Chief Complaint: Caridad Hinds is a 79 y.o. female who had concerns including Follow-up and Numbness of the Right Hand.    History of Present Illness    CHIEF COMPLAINT:  Bilateral carpal tunnel syndrome, right side more symptomatic.    HPI:  Ms. Hinds presents for evaluation of bilateral carpal tunnel syndrome, with the right side being more problematic. Symptoms include numbness and tingling in the thumb, index finger, and middle finger bilaterally, with very mild symptoms in the ring and little fingers. The right hand is more severely affected than the left.    The onset was sudden, occurring during sleep. The symptoms disrupt her sleep, causing her to wake up at night. She initially thought it was a serious condition due to its sudden onset during sleep. She had never experienced problems with her hand or wrist before this incident.    The right hand symptoms affect her ability to drive. The left hand symptoms remain minor compared to the right.    She previously underwent testing with Dr. Kemp, who explained the condition to her. She was surprised by the diagnosis.    She denies any previous history of carpal tunnel syndrome.    PREVIOUS TREATMENTS:  Ms. Hinds has used a wrist brace at night to keep her wrist straight and maintain an open carpal tunnel.      ROS:  General: denies fever, denies chills, denies fatigue, denies weight gain, denies weight loss, reports sleep disturbances  Eyes: denies vision changes, denies redness, denies discharge  ENT: denies ear pain, denies nasal congestion, denies sore throat  Cardiovascular: denies chest pain, denies palpitations, denies lower extremity edema  Respiratory: denies cough, denies shortness of breath  Gastrointestinal: denies abdominal pain, denies nausea, denies vomiting, denies diarrhea, denies constipation, denies blood in stool  Genitourinary: denies dysuria, denies hematuria, denies frequency  Musculoskeletal: denies joint pain, denies  muscle pain  Skin: denies rash, denies lesion  Neurological: denies headache, denies dizziness, reports numbness, reports tingling, reports nerve pain  Psychiatric: denies anxiety, denies depression, denies sleep difficulty                     Past Medical History[1]     Past Surgical History:   Procedure Laterality Date    EPIDURAL STEROID INJECTION N/A 7/24/2024    Procedure: CAUDAL JUSTICE *PLAVIX CLEARANCE REQUESTED*;  Surgeon: Yonas Pelaez MD;  Location: [facility];  Service: Pain Management;  Laterality: N/A;  [phone]  2 WK F/U FRANCESCA    falopian Left 1986    ectopic pregnancy    THYROIDECTOMY, PARTIAL N/A 1972    TRANSFORAMINAL EPIDURAL INJECTION OF STEROID Right 9/4/2024    Procedure: LUMBAR TRANSFORAMNAL RIGHT L4/5 AND L5/S1 *PLAVIX CLEARANCE REQUESTED*;  Surgeon: Yonas Pelaez MD;  Location: [facility];  Service: Pain Management;  Laterality: Right;  [phone]  2 WK F/U [facility]       Objective:     General: Caridad is well-developed, well-nourished, appears stated age, in no acute distress, alert and oriented to time, place and person.     Ortho/SPM Exam  RUE:   Able to make a full composite fist.    2+ radial pulse.    Sensation intact to light touch though she does endorse paresthesias to the thumb, index, middle fingers.    Positive Phalen's.    Negative Tinel's at the wrist, elbow.    EMG:     Radiographic findings:    X-Ray Hand Complete Right  Narrative: EXAMINATION:  XR HAND COMPLETE 3 VIEW RIGHT    CLINICAL HISTORY:  Pain in right hand    TECHNIQUE:  PA, lateral, and oblique views of the right hand were performed.    COMPARISON:  None    FINDINGS:  Some degenerative changes seen particularly at the proximal interphalangeal joint of 4th finger and prominent degenerative changes seen at the 1st carpal-metacarpal joint space.  Impression: See above    Electronically signed by: Shawn Naranjo MD  Date:    07/01/2025  Time:    10:57       Assessment:     Moderate carpal tunnel syndrome  of the right.    Plan:     Assessment & Plan     Carpal tunnel surgery for the right hand.   Procedure involves sedation for relaxation and numbing medicine.   Outpatient procedure with same-day discharge.   Post-op symptoms: temporary finger numbness, immediate pain relief, and incision site soreness for a few weeks.   Use night splint to keep wrist straight during sleep.   Follow up with assistant to receive potential surgery dates.   Contact office when ready to schedule surgery.           All of the patient's questions were answered and the patient will contact us if they have any questions or concerns in the interim.    This note was generated with the assistance of ambient listening technology. Verbal consent was obtained by the patient and accompanying visitor(s) for the recording of patient appointment to facilitate this note. I attest to having reviewed and edited the generated note for accuracy, though some syntax or spelling errors may persist. Please contact the author of this note for any clarification.             [1]   Past Medical History:  Diagnosis Date    Epigastric fullness 07/03/2025    Esophageal obstruction 09/23/2022    Family history of malignant neoplasm of digestive organs 04/08/2010    Hyperlipidemia     Hypertension     Left facial numbness 11/22/2022    Other diseases of stomach and duodenum 09/23/2022    Right ovarian cyst 05/03/2025    Second degree hemorrhoids 07/20/2016    Thyroid disease     TIA (transient ischemic attack) 11/22/2022    Unspecified cataract 07/03/2025

## 2025-08-04 ENCOUNTER — TELEPHONE (OUTPATIENT)
Dept: PAIN MEDICINE | Facility: CLINIC | Age: 80
End: 2025-08-04
Payer: MEDICARE

## 2025-08-04 NOTE — TELEPHONE ENCOUNTER
Staff attempted to contact the patient to inform that their upcoming appointment will need to be canceled and rescheduled due to the provider being out of the office. A brief voicemail was left, and a message was also sent via the patient portal.

## 2025-08-06 NOTE — PROGRESS NOTES
Patient ID: Caridad Hinds is a 79 y.o. female.    Chief Complaint: Follow-up and Numbness of the Right Hand    History of Present Illness    CHIEF COMPLAINT:  Bilateral carpal tunnel syndrome, right side more symptomatic.    HPI:  Ms. Hinds presents for evaluation of bilateral carpal tunnel syndrome, with the right side being more problematic. Symptoms include numbness and tingling in the thumb, index finger, and middle finger bilaterally, with very mild symptoms in the ring and little fingers. The right hand is more severely affected than the left.    The onset was sudden, occurring during sleep. The symptoms disrupt her sleep, causing her to wake up at night. She initially thought it was a serious condition due to its sudden onset during sleep. She had never experienced problems with her hand or wrist before this incident.    The right hand symptoms affect her ability to drive. The left hand symptoms remain minor compared to the right.    She previously underwent testing with Dr. Kemp, who explained the condition to her. She was surprised by the diagnosis.    She denies any previous history of carpal tunnel syndrome.    PREVIOUS TREATMENTS:  Ms. Hinds has used a wrist brace at night to keep her wrist straight and maintain an open carpal tunnel.      ROS:  General: denies fever, denies chills, denies fatigue, denies weight gain, denies weight loss, reports sleep disturbances  Eyes: denies vision changes, denies redness, denies discharge  ENT: denies ear pain, denies nasal congestion, denies sore throat  Cardiovascular: denies chest pain, denies palpitations, denies lower extremity edema  Respiratory: denies cough, denies shortness of breath  Gastrointestinal: denies abdominal pain, denies nausea, denies vomiting, denies diarrhea, denies constipation, denies blood in stool  Genitourinary: denies dysuria, denies hematuria, denies frequency  Musculoskeletal: denies joint pain, denies muscle pain  Skin: denies rash,  denies lesion  Neurological: denies headache, denies dizziness, reports numbness, reports tingling, reports nerve pain  Psychiatric: denies anxiety, denies depression, denies sleep difficulty         Physical Exam          See resident note    Assessment & Plan     Carpal tunnel surgery for the right hand.   Procedure involves sedation for relaxation and numbing medicine.   Outpatient procedure with same-day discharge.   Post-op symptoms: temporary finger numbness, immediate pain relief, and incision site soreness for a few weeks.   Use night splint to keep wrist straight during sleep.   Follow up with assistant to receive potential surgery dates.   Contact office when ready to schedule surgery.              No follow-ups on file.    This note was generated with the assistance of ambient listening technology. Verbal consent was obtained by the patient and accompanying visitor(s) for the recording of patient appointment to facilitate this note. I attest to having reviewed and edited the generated note for accuracy, though some syntax or spelling errors may persist. Please contact the author of this note for any clarification.

## 2025-08-12 ENCOUNTER — TELEPHONE (OUTPATIENT)
Dept: PAIN MEDICINE | Facility: CLINIC | Age: 80
End: 2025-08-12
Payer: MEDICARE

## 2025-08-18 ENCOUNTER — OFFICE VISIT (OUTPATIENT)
Dept: PAIN MEDICINE | Facility: CLINIC | Age: 80
End: 2025-08-18
Payer: MEDICARE

## 2025-08-18 ENCOUNTER — TELEPHONE (OUTPATIENT)
Dept: PAIN MEDICINE | Facility: CLINIC | Age: 80
End: 2025-08-18
Payer: MEDICARE

## 2025-08-18 VITALS
BODY MASS INDEX: 23.85 KG/M2 | OXYGEN SATURATION: 100 % | HEIGHT: 62 IN | RESPIRATION RATE: 18 BRPM | DIASTOLIC BLOOD PRESSURE: 87 MMHG | HEART RATE: 78 BPM | TEMPERATURE: 99 F | SYSTOLIC BLOOD PRESSURE: 142 MMHG | WEIGHT: 129.63 LBS

## 2025-08-18 DIAGNOSIS — M17.11 PRIMARY OSTEOARTHRITIS OF RIGHT KNEE: Primary | ICD-10-CM

## 2025-08-18 DIAGNOSIS — M51.360 DEGENERATION OF INTERVERTEBRAL DISC OF LUMBAR REGION WITH DISCOGENIC BACK PAIN: ICD-10-CM

## 2025-08-18 DIAGNOSIS — M48.062 SPINAL STENOSIS OF LUMBAR REGION WITH NEUROGENIC CLAUDICATION: ICD-10-CM

## 2025-08-18 DIAGNOSIS — G56.01 CARPAL TUNNEL SYNDROME OF RIGHT WRIST: Primary | ICD-10-CM

## 2025-08-18 DIAGNOSIS — G89.4 CHRONIC PAIN SYNDROME: ICD-10-CM

## 2025-08-18 PROCEDURE — 99214 OFFICE O/P EST MOD 30 MIN: CPT | Mod: S$PBB,,,

## 2025-08-18 PROCEDURE — 99214 OFFICE O/P EST MOD 30 MIN: CPT | Mod: PBBFAC

## 2025-08-18 PROCEDURE — 99999 PR PBB SHADOW E&M-EST. PATIENT-LVL IV: CPT | Mod: PBBFAC,,,

## 2025-08-18 RX ORDER — CELECOXIB 100 MG/1
100 CAPSULE ORAL 2 TIMES DAILY PRN
Qty: 60 CAPSULE | Refills: 1 | Status: SHIPPED | OUTPATIENT
Start: 2025-08-18

## 2025-09-02 ENCOUNTER — PROCEDURE VISIT (OUTPATIENT)
Dept: PAIN MEDICINE | Facility: CLINIC | Age: 80
End: 2025-09-02
Payer: MEDICARE

## 2025-09-02 VITALS
TEMPERATURE: 98 F | HEIGHT: 62 IN | DIASTOLIC BLOOD PRESSURE: 78 MMHG | BODY MASS INDEX: 22.71 KG/M2 | HEART RATE: 85 BPM | WEIGHT: 123.44 LBS | SYSTOLIC BLOOD PRESSURE: 117 MMHG | OXYGEN SATURATION: 100 % | RESPIRATION RATE: 18 BRPM

## 2025-09-02 DIAGNOSIS — M17.11 PRIMARY OSTEOARTHRITIS OF RIGHT KNEE: Primary | ICD-10-CM

## 2025-09-02 PROCEDURE — 20611 DRAIN/INJ JOINT/BURSA W/US: CPT | Mod: PBBFAC | Performed by: ANESTHESIOLOGY
